# Patient Record
Sex: FEMALE | Race: WHITE | Employment: OTHER | URBAN - NONMETROPOLITAN AREA
[De-identification: names, ages, dates, MRNs, and addresses within clinical notes are randomized per-mention and may not be internally consistent; named-entity substitution may affect disease eponyms.]

---

## 2013-02-26 LAB
CHOLESTEROL, TOTAL: 138 MG/DL
CHOLESTEROL/HDL RATIO: 3.5
HDLC SERPL-MCNC: 40 MG/DL (ref 35–70)
LDL CHOLESTEROL CALCULATED: 79.6 MG/DL (ref 0–160)
TRIGL SERPL-MCNC: NORMAL MG/DL
VLDLC SERPL CALC-MCNC: 18 MG/DL

## 2017-09-30 LAB
AGE FOR GFR: 76
ANION GAP SERPL CALCULATED.3IONS-SCNC: 14 MMOL/L
BUN BLDV-MCNC: 13 MG/DL
CALCIUM SERPL-MCNC: 10 MG/DL
CHLORIDE BLD-SCNC: 103 MMOL/L
CO2: 31 MMOL/L
CREAT SERPL-MCNC: 0.7 MG/DL
EGFR BF: 98 ML/MIN/1.73 M2
EGFR BM: 133 ML/MIN/1.73 M2
EGFR WF: 81 ML/MIN/1.73 M2
EGFR WM: 110 ML/MIN/1.73 M2
GLUCOSE: 78 MG/DL
HCT VFR BLD CALC: 45.6 %
HEMOGLOBIN: 15.2 G/DL
MCH RBC QN AUTO: 30.6 PG
MCHC RBC AUTO-ENTMCNC: 33.2 G/DL
MCV RBC AUTO: 92.1 FL
PDW BLD-RTO: 12.2 %
PLATELET # BLD: 279 THOU/MM3
PMV BLD AUTO: 9.2 FL
POTASSIUM SERPL-SCNC: 4.5 MMOL/L
RBC # BLD: 4.95 M/UL
SODIUM BLD-SCNC: 143 MMOL/L
VITAMIN D 1,25-DIHYDROXY: NORMAL
WBC # BLD: 7.61 THOU/ML3

## 2017-10-02 VITALS
WEIGHT: 140 LBS | HEART RATE: 64 BPM | DIASTOLIC BLOOD PRESSURE: 66 MMHG | HEIGHT: 65 IN | BODY MASS INDEX: 23.32 KG/M2 | SYSTOLIC BLOOD PRESSURE: 110 MMHG

## 2017-10-02 DIAGNOSIS — I10 UNSPECIFIED ESSENTIAL HYPERTENSION: ICD-10-CM

## 2017-10-02 DIAGNOSIS — E55.9 VITAMIN D DEFICIENCY: ICD-10-CM

## 2017-10-02 DIAGNOSIS — R32 UNSPECIFIED URINARY INCONTINENCE: ICD-10-CM

## 2017-10-02 DIAGNOSIS — M81.0 AGE-RELATED OSTEOPOROSIS WITHOUT CURRENT PATHOLOGICAL FRACTURE: ICD-10-CM

## 2017-10-02 DIAGNOSIS — G12.23 PRIMARY LATERAL SCLEROSIS (HCC): ICD-10-CM

## 2017-10-02 RX ORDER — FOLIC ACID 1 MG/1
1 TABLET ORAL DAILY
COMMUNITY

## 2017-10-03 ENCOUNTER — OFFICE VISIT (OUTPATIENT)
Dept: FAMILY MEDICINE CLINIC | Age: 77
End: 2017-10-03
Payer: MEDICARE

## 2017-10-03 VITALS
DIASTOLIC BLOOD PRESSURE: 70 MMHG | SYSTOLIC BLOOD PRESSURE: 120 MMHG | WEIGHT: 140 LBS | HEART RATE: 68 BPM | HEIGHT: 65 IN | BODY MASS INDEX: 23.32 KG/M2

## 2017-10-03 DIAGNOSIS — I10 UNSPECIFIED ESSENTIAL HYPERTENSION: Primary | ICD-10-CM

## 2017-10-03 DIAGNOSIS — Z23 NEED FOR PROPHYLACTIC VACCINATION AND INOCULATION AGAINST INFLUENZA: ICD-10-CM

## 2017-10-03 DIAGNOSIS — G12.23 PRIMARY LATERAL SCLEROSIS (HCC): ICD-10-CM

## 2017-10-03 DIAGNOSIS — E55.9 VITAMIN D DEFICIENCY: ICD-10-CM

## 2017-10-03 DIAGNOSIS — R32 UNSPECIFIED URINARY INCONTINENCE: ICD-10-CM

## 2017-10-03 DIAGNOSIS — M81.0 AGE-RELATED OSTEOPOROSIS WITHOUT CURRENT PATHOLOGICAL FRACTURE: ICD-10-CM

## 2017-10-03 PROCEDURE — G0008 ADMIN INFLUENZA VIRUS VAC: HCPCS | Performed by: FAMILY MEDICINE

## 2017-10-03 PROCEDURE — 90662 IIV NO PRSV INCREASED AG IM: CPT | Performed by: FAMILY MEDICINE

## 2017-10-03 PROCEDURE — 99214 OFFICE O/P EST MOD 30 MIN: CPT | Performed by: FAMILY MEDICINE

## 2017-10-03 ASSESSMENT — ENCOUNTER SYMPTOMS
WHEEZING: 0
SHORTNESS OF BREATH: 0
CONSTIPATION: 0
DIARRHEA: 0
ABDOMINAL PAIN: 0
BLOOD IN STOOL: 0
SORE THROAT: 0

## 2017-10-03 ASSESSMENT — PATIENT HEALTH QUESTIONNAIRE - PHQ9
1. LITTLE INTEREST OR PLEASURE IN DOING THINGS: 0
2. FEELING DOWN, DEPRESSED OR HOPELESS: 0
SUM OF ALL RESPONSES TO PHQ9 QUESTIONS 1 & 2: 0
SUM OF ALL RESPONSES TO PHQ QUESTIONS 1-9: 0

## 2017-10-03 NOTE — PROGRESS NOTES
[address] [address] [address]  Dept: [phone]  Dept Fax: [phone]    Grady Mcleod is a 69 y.o. female who presents today for her medical conditions/complaints as noted below.   Grady Mcleod is c/o of 6 Month Follow-Up (pt would like to have a referral to podiatrist states it is difficult to trim her toenails, pt also states it is becoming more difficult for her to get around with her sclerosis states there are times where she gets very stiff) and Hypertension (denies chest pains,denies sob,denies dizziness with physical exercises at home, denies leg edema, denies palpatations)      HPI:     HPI   Hypertension  Doesn't really check   well controlled; BP: 120/70   No problems with medication side effects; tolerating well  No chest pain  No edema   Neurology  Dr Dani Castaneda- doesn't see him until November; appointments moved back a couple of times   Thinks her PLS has progressed a little since last visit  Feels a little stiffer; left leg in particular seems more difficult to move     Patient would like a referral to podiatry   Needs her nails trimmed and left great toenail is \"overgrown\" and hurting  Physically she cannot reach it           BP Readings from Last 3 Encounters:   10/03/17 120/70   03/31/17 110/66   07/18/16 124/64            (goal 120/80)    Past Medical History:   Diagnosis Date    Hypertension     Osteoarthritis     Osteoporosis       Past Surgical History:   Procedure Laterality Date    APPENDECTOMY  11/1977    HYSTERECTOMY, TOTAL ABDOMINAL  1977    ovaries remain    TONSILLECTOMY AND ADENOIDECTOMY  1946     Family History   Problem Relation Age of Onset   [employer] Migraines Mother     Heart Disease Mother      valvular    Migraines Father     Coronary Art Dis Father     Other Father      COPD    Cancer Brother      throat    Breast Cancer Maternal Aunt      Social History   Substance Use Topics    Smoking status: Never Smoker    Smokeless tobacco: Never Used    Alcohol use No        Current Outpatient Prescriptions   Medication Sig Dispense Refill    oxybutynin (OXYTROL) 3.9 MG/24HR Place 1 patch onto the skin Twice a Week      folic acid (FOLVITE) 1 MG tablet Take 1 mg by mouth daily      VITAMIN D-3 (COLECALCIFEROL) 400 UNITS TABS Take  by mouth daily.  alendronate (FOSAMAX) 70 MG tablet Take 70 mg by mouth every 7 days.  lisinopril (PRINIVIL;ZESTRIL) 10 MG tablet Take 10 mg by mouth daily.  baclofen (LIORESAL) 20 MG tablet Take 20 mg by mouth 3 times daily.  vitamin B-12 (CYANOCOBALAMIN) 500 MCG tablet Take 500 mcg by mouth daily. No current facility-administered medications for this visit. No Known Allergies    Health Maintenance   Topic Date Due    Zostavax vaccine  01/11/2001    DTaP/Tdap/Td vaccine (1 - Tdap) 07/17/2009    Lipid screen  02/26/2018    DEXA (modify frequency per FRAX score)  Completed    Flu vaccine  Completed    Pneumococcal low/med risk  Completed       Subjective:      Review of Systems   Constitutional: Negative for appetite change, chills and fever. HENT: Negative for sore throat. Respiratory: Negative for shortness of breath and wheezing. Cardiovascular: Negative for chest pain, palpitations and leg swelling. Gastrointestinal: Negative for abdominal pain, blood in stool, constipation and diarrhea. Genitourinary: Negative for dysuria, hematuria and urgency. Neurological: Negative for dizziness, syncope and speech difficulty. Denies any falls; uses walker   Hematological: Negative for adenopathy. Objective:     /70  Pulse 68  Ht 5' 5\" (1.651 m)  Wt 140 lb (63.5 kg)  BMI 23.3 kg/m2    Physical Exam   Constitutional: She appears well-developed and well-nourished. HENT:   Head: Normocephalic.    Right Ear: Tympanic membrane normal.   Left Ear: Tympanic membrane normal.   Nose: Nose normal.   Mouth/Throat: No oropharyngeal exudate Priority:   Routine     Referral Type:   Eval and Treat     Referral Reason:   Specialty Services Required     Referred to Provider:   Mojgan Dial DPM     Requested Specialty:   Podiatry     Number of Visits Requested:   1     Prescriptions:    No orders of the defined types were placed in this encounter. Return in about 6 months (around 4/3/2018). Electronically signed by Jazmine Hinds MD on 10/4/2017.

## 2017-10-03 NOTE — MR AVS SNAPSHOT
oxybutynin (OXYTROL) 3.9 MG/24HR Place 1 patch onto the skin Twice a Week    folic acid (FOLVITE) 1 MG tablet Take 1 mg by mouth daily    VITAMIN D-3 (COLECALCIFEROL) 400 UNITS TABS Take  by mouth daily. alendronate (FOSAMAX) 70 MG tablet Take 70 mg by mouth every 7 days. lisinopril (PRINIVIL;ZESTRIL) 10 MG tablet Take 10 mg by mouth daily. baclofen (LIORESAL) 20 MG tablet Take 20 mg by mouth 3 times daily. vitamin B-12 (CYANOCOBALAMIN) 500 MCG tablet Take 500 mcg by mouth daily. Allergies           No Known Allergies      We Ordered/Performed the following           INFLUENZA, HIGH DOSE, 65 YRS +, IM, PF, PREFILL SYR, 0.5ML (FLUZONE HD)     KYLER Lyons [place], [facility] [place]     Scheduling Instructions:    [address], [facility]  [place], [address]  [phone]    Comments:    Needs routine care; unable to do herself due to her neurologic disease         Additional Information        Basic Information     Date Of Birth Sex Race Ethnicity Preferred Language    1940 Female White Non-/Non  English      Problem List as of 10/3/2017  Date Reviewed: 10/3/2017                Unspecified essential hypertension    Age-related osteoporosis without current pathological fracture    Primary lateral sclerosis    Unspecified urinary incontinence    Vitamin D deficiency      Immunizations as of 10/3/2017     Name Date    Influenza, High Dose 10/3/2017    Pneumococcal 13-valent Conjugate ([id number]) 9/16/2015    Pneumococcal Polysaccharide ([id number]) 7/16/2009    Td 7/16/2009      Preventive Care        Date Due    Zoster Vaccine 1/11/2001    Tetanus Combination Vaccine (1 - Tdap) 7/17/2009    Cholesterol Screening 2/26/2018            MyChart Signup           [email or web address] allows you to send messages to your doctor, view your test results, renew your prescriptions, schedule appointments, view visit notes, and more. How Do I Sign Up? 1. In your Internet browser, go to [email or web address]  2. Click on the Sign Up Now link in the Sign In box. You will see the New Member Sign Up page. 3. Enter your [employer] Access Code exactly as it appears below. You will not need to use this code after youve completed the sign-up process. If you do not sign up before the expiration date, you must request a new code. [employer] Access Code: [email or web address]  Expires: 12/2/2017 11:27 AM    4. Enter your Social Security Number ([id number]) and Date of Birth (mm/dd/yyyy) as indicated and click Submit. You will be taken to the next sign-up page. 5. Create a Arkeot ID. This will be your Educanon login ID and cannot be changed, so think of one that is secure and easy to remember. 6. Create a Educanon password. You can change your password at any time. 7. Enter your Password Reset Question and Answer. This can be used at a later time if you forget your password. 8. Enter your e-mail address. You will receive e-mail notification when new information is available in [address] 9. Click Sign Up. You can now view your medical record. Additional Information  If you have questions, please contact the physician practice where you receive care. Remember, Educanon is NOT to be used for urgent needs. For medical emergencies, dial 911. For questions regarding your [employer]can[employer] account call [phone]. If you have a clinical question, please call your doctor's office.

## 2017-10-13 DIAGNOSIS — M81.0 AGE-RELATED OSTEOPOROSIS WITHOUT CURRENT PATHOLOGICAL FRACTURE: ICD-10-CM

## 2017-10-17 ENCOUNTER — OFFICE VISIT (OUTPATIENT)
Dept: PODIATRY | Age: 77
End: 2017-10-17
Payer: MEDICARE

## 2017-10-17 VITALS
HEART RATE: 60 BPM | SYSTOLIC BLOOD PRESSURE: 122 MMHG | DIASTOLIC BLOOD PRESSURE: 70 MMHG | WEIGHT: 141 LBS | BODY MASS INDEX: 23.49 KG/M2 | HEIGHT: 65 IN

## 2017-10-17 DIAGNOSIS — I73.9 PVD (PERIPHERAL VASCULAR DISEASE) (HCC): Primary | ICD-10-CM

## 2017-10-17 DIAGNOSIS — B35.1 DERMATOPHYTOSIS OF NAIL: ICD-10-CM

## 2017-10-17 PROCEDURE — 99202 OFFICE O/P NEW SF 15 MIN: CPT | Performed by: PODIATRIST

## 2017-10-17 PROCEDURE — 11721 DEBRIDE NAIL 6 OR MORE: CPT | Performed by: PODIATRIST

## 2017-10-17 NOTE — PROGRESS NOTES
Subjective:  Patient presents to [facility] today for routine foot care. Pt unable to take care of the nails by herself. Pain at great toes. No n/v/f/c/d. Patient denies any new problems with their feet. No Known Allergies    Past Medical History:   Diagnosis Date    Hypertension     Osteoarthritis     Osteoporosis     Primary lateral sclerosis        Prior to Admission medications    Medication Sig Start Date End Date Taking? Authorizing Provider   oxybutynin (OXYTROL) 3.9 MG/24HR Place 1 patch onto the skin Twice a Week   Yes Historical Provider, MD   folic acid (FOLVITE) 1 MG tablet Take 1 mg by mouth daily   Yes Historical Provider, MD   VITAMIN D-3 (COLECALCIFEROL) 400 UNITS TABS Take  by mouth daily. Yes Historical Provider, MD   alendronate (FOSAMAX) 70 MG tablet Take 70 mg by mouth every 7 days. Yes Historical Provider, MD   lisinopril (PRINIVIL;ZESTRIL) 10 MG tablet Take 10 mg by mouth daily. Yes Historical Provider, MD   baclofen (LIORESAL) 20 MG tablet Take 20 mg by mouth 3 times daily. Yes Historical Provider, MD   vitamin B-12 (CYANOCOBALAMIN) 500 MCG tablet Take 500 mcg by mouth daily. Yes Historical Provider, MD       Social History   Substance Use Topics    Smoking status: Never Smoker    Smokeless tobacco: Never Used    Alcohol use No     Review of Systems: All 12 systems reviewed and pertinent positives noted above. Objective:  Vascular: DP and PT pulses palpable 2/4, bilateral.  CFT <3 seconds, bilateral.  Hair growth present to the level of the digits, bilateral.  Edema present, bilateral.  Varicosities present, bilateral. Erythema absent, bilateral. Distal Rubor absent bilateral.  Temperature within normal limits bilateral. Hyperpigmentation present bilateral. Atrophic skin yes.   Neurological: Sensation intact to light touch to level of digits, bilateral.  Protective sensation intact 10/10 sites via 5.07/10g [place] Monofilament, bilateral.  negative Tinel's, bilateral.  negative Valleix sign, bilateral.  Vibratory intact bilateral.  Reflexes Decreased bilateral.  Paresthesias negative. Dysthesias negative. Sharp/dull intact bilateral.    Musculoskeletal: Muscle strength 5/5, bilateral.  Pain absent upon palpation bilateral. Normal medial longitudinal arch, bilateral.  Ankle ROM within normal limits,bilateral.  1st MPJ ROM within normal limits, bilateral.  Dorsally contracted digits absent. No other foot deformities. Integument:  Nails left 1, 2, 3, 4, 5 and right 1, 2, 3, 4, 5 thickened, dystrophic and crumbly, discolored with subungual debris. Hyperkeratotic tissue is absent. Assessment:  1. PVD (peripheral vascular disease) (HCC)  RI DEBRIDEMENT OF NAILS, 6 OR MORE   2. Dermatophytosis of nail  RI DEBRIDEMENT OF NAILS, 6 OR MORE       Plan:  Nails as mentioned above debrided in length and thickness. Patient advised OTC methods for treatment of the mycotic nails. Patient will follow up in 10 weeks.

## 2017-11-08 ENCOUNTER — OFFICE VISIT (OUTPATIENT)
Dept: NEUROLOGY | Age: 77
End: 2017-11-08
Payer: MEDICARE

## 2017-11-08 VITALS
HEIGHT: 65 IN | WEIGHT: 138 LBS | BODY MASS INDEX: 22.99 KG/M2 | HEART RATE: 58 BPM | SYSTOLIC BLOOD PRESSURE: 105 MMHG | DIASTOLIC BLOOD PRESSURE: 59 MMHG

## 2017-11-08 DIAGNOSIS — R42 VERTIGO: ICD-10-CM

## 2017-11-08 DIAGNOSIS — G12.23 PRIMARY LATERAL SCLEROSIS (HCC): Primary | ICD-10-CM

## 2017-11-08 PROCEDURE — 99214 OFFICE O/P EST MOD 30 MIN: CPT | Performed by: PSYCHIATRY & NEUROLOGY

## 2017-11-08 ASSESSMENT — ENCOUNTER SYMPTOMS
GASTROINTESTINAL NEGATIVE: 1
RESPIRATORY NEGATIVE: 1
EYES NEGATIVE: 1

## 2017-11-13 NOTE — COMMUNICATION BODY
valvular    Migraines Father     Coronary Art Dis Father     Other Father      COPD    Cancer Brother      throat    Breast Cancer Maternal Aunt        Social History     Social History    Marital status:      Spouse name: N/A    Number of children: N/A    Years of education: N/A     Social History Main Topics    Smoking status: Never Smoker    Smokeless tobacco: Never Used    Alcohol use No    Drug use: No    Sexual activity: Not Asked     Other Topics Concern    None     Social History Narrative    None       Current Outpatient Prescriptions   Medication Sig Dispense Refill    oxybutynin (OXYTROL) 3.9 MG/24HR Place 1 patch onto the skin Twice a Week      folic acid (FOLVITE) 1 MG tablet Take 1 mg by mouth daily      VITAMIN D-3 (COLECALCIFEROL) 400 UNITS TABS Take  by mouth daily.  alendronate (FOSAMAX) 70 MG tablet Take 70 mg by mouth every 7 days.  lisinopril (PRINIVIL;ZESTRIL) 10 MG tablet Take 10 mg by mouth daily.  baclofen (LIORESAL) 20 MG tablet Take 20 mg by mouth 3 times daily.  vitamin B-12 (CYANOCOBALAMIN) 500 MCG tablet Take 500 mcg by mouth daily. No current facility-administered medications for this visit. No Known Allergies      Review of Systems   Constitutional: Negative. HENT: Negative. Eyes: Negative. Respiratory: Negative. Cardiovascular: Negative. Gastrointestinal: Negative. Endocrine: Negative. Genitourinary: Positive for frequency and urgency. Musculoskeletal: Negative for gait problem. Skin: Negative. Neurological: Negative. Psychiatric/Behavioral: Negative. Objective:   Physical Exam  Vitals:    11/08/17 1425   BP: (!) 105/59   Pulse: 58     weight: 138 lb (62.6 kg)  Neurological Examination  Constitutional .General exam well groomed   Ears /Nose/Throat: external ear . Normal exam  Neck and thyroid . Normal size  Respiratory . Breathsounds clear bilaterally  Cardiovascular:  Auscultation of heart

## 2017-11-22 ENCOUNTER — HOSPITAL ENCOUNTER (OUTPATIENT)
Dept: INTERVENTIONAL RADIOLOGY/VASCULAR | Age: 77
Discharge: HOME | End: 2017-11-22
Payer: MEDICARE

## 2017-11-22 ENCOUNTER — HOSPITAL ENCOUNTER (OUTPATIENT)
Dept: CT IMAGING | Age: 77
Discharge: HOME | End: 2017-11-22
Payer: MEDICARE

## 2017-11-22 DIAGNOSIS — R42 VERTIGO: ICD-10-CM

## 2017-11-22 PROCEDURE — 70450 CT HEAD/BRAIN W/O DYE: CPT

## 2017-11-22 PROCEDURE — 93880 EXTRACRANIAL BILAT STUDY: CPT

## 2018-04-03 ENCOUNTER — OFFICE VISIT (OUTPATIENT)
Dept: FAMILY MEDICINE CLINIC | Age: 78
End: 2018-04-03
Payer: MEDICARE

## 2018-04-03 VITALS
HEART RATE: 64 BPM | DIASTOLIC BLOOD PRESSURE: 70 MMHG | SYSTOLIC BLOOD PRESSURE: 104 MMHG | BODY MASS INDEX: 22.8 KG/M2 | WEIGHT: 137 LBS

## 2018-04-03 DIAGNOSIS — M19.90 OSTEOARTHRITIS, UNSPECIFIED OSTEOARTHRITIS TYPE, UNSPECIFIED SITE: ICD-10-CM

## 2018-04-03 DIAGNOSIS — G12.23 PRIMARY LATERAL SCLEROSIS (HCC): ICD-10-CM

## 2018-04-03 DIAGNOSIS — E55.9 VITAMIN D DEFICIENCY: ICD-10-CM

## 2018-04-03 DIAGNOSIS — M81.0 AGE-RELATED OSTEOPOROSIS WITHOUT CURRENT PATHOLOGICAL FRACTURE: ICD-10-CM

## 2018-04-03 DIAGNOSIS — I10 ESSENTIAL HYPERTENSION: Primary | ICD-10-CM

## 2018-04-03 PROCEDURE — 99214 OFFICE O/P EST MOD 30 MIN: CPT | Performed by: FAMILY MEDICINE

## 2018-04-03 RX ORDER — LISINOPRIL 10 MG/1
10 TABLET ORAL DAILY
Qty: 30 TABLET | Refills: 5 | Status: SHIPPED | OUTPATIENT
Start: 2018-04-03 | End: 2018-07-30 | Stop reason: SDUPTHER

## 2018-04-03 RX ORDER — BACLOFEN 20 MG/1
20 TABLET ORAL 3 TIMES DAILY
Qty: 90 TABLET | Refills: 3 | Status: SHIPPED | OUTPATIENT
Start: 2018-04-03 | End: 2018-07-27 | Stop reason: SDUPTHER

## 2018-04-03 RX ORDER — ALENDRONATE SODIUM 70 MG/1
70 TABLET ORAL
Qty: 4 TABLET | Refills: 12 | Status: SHIPPED | OUTPATIENT
Start: 2018-04-03 | End: 2018-10-11 | Stop reason: ALTCHOICE

## 2018-04-03 ASSESSMENT — ENCOUNTER SYMPTOMS
BLOOD IN STOOL: 0
SHORTNESS OF BREATH: 0
CONSTIPATION: 0
ABDOMINAL PAIN: 0
DIARRHEA: 0
WHEEZING: 0
SORE THROAT: 0

## 2018-07-27 DIAGNOSIS — I10 ESSENTIAL HYPERTENSION: ICD-10-CM

## 2018-07-27 RX ORDER — BACLOFEN 20 MG/1
TABLET ORAL
Qty: 90 TABLET | Refills: 3 | Status: SHIPPED | OUTPATIENT
Start: 2018-07-27 | End: 2019-10-11

## 2018-07-27 NOTE — TELEPHONE ENCOUNTER
Emili Aguilar is calling to request a refill on the following medication(s):  Requested Prescriptions     Pending Prescriptions Disp Refills    baclofen (LIORESAL) 20 MG tablet [Pharmacy Med Name: BACLOFEN 20MG       TAB] 90 tablet 3     Sig: TAKE 1 TABLET BY MOUTH THREE TIMES DAILY       Last Visit Date (If Applicable):  6/0/7410    Next Visit Date:    10/11/2018

## 2018-07-30 DIAGNOSIS — I10 ESSENTIAL HYPERTENSION: ICD-10-CM

## 2018-07-30 RX ORDER — LISINOPRIL 10 MG/1
10 TABLET ORAL DAILY
Qty: 90 TABLET | Refills: 3 | Status: SHIPPED | OUTPATIENT
Start: 2018-07-30 | End: 2019-10-07 | Stop reason: SDUPTHER

## 2018-10-03 LAB
A/G RATIO: 1.3 RATIO
AGE FOR GFR: 77
ALBUMIN: 3.8 G/DL
ALK PHOSPHATASE: 60 UNITS/L
ALT SERPL-CCNC: 35 UNITS/L
ANION GAP SERPL CALCULATED.3IONS-SCNC: 10 MMOL/L
AST SERPL-CCNC: 16 UNITS/L
BASOPHILS # BLD: 0.05 THOU/MM3
BILIRUB SERPL-MCNC: 0.7 MG/DL
BUN BLDV-MCNC: 14 MG/DL
CALCIUM SERPL-MCNC: 8.9 MG/DL
CHLORIDE BLD-SCNC: 103 MMOL/L
CO2: 30 MMOL/L
CREAT SERPL-MCNC: 0.6 MG/DL
DIFFERENTIAL: AUTOMATED DIFF
EGFR BF: 117 ML/MIN/1.73 M2
EGFR BM: 158 ML/MIN/1.73 M2
EGFR WF: 97 ML/MIN/1.73 M2
EGFR WM: 131 ML/MIN/1.73 M2
EOSINOPHIL # BLD: 0.09 THOU/MM3
GLOBULIN: 2.9 G/DL
GLUCOSE: 90 MG/DL
HCT VFR BLD CALC: 45.1 %
HEMOGLOBIN: 15 G/DL
LYMPHOCYTES # BLD: 1.23 THOU/MM3
MCH RBC QN AUTO: 30.5 PG
MCHC RBC AUTO-ENTMCNC: 33.3 G/DL
MCV RBC AUTO: 91.7 FL
MONOCYTES # BLD: 0.45 THOU/MM3
NEUTROPHILS: 4.47 THOU/MM3
PDW BLD-RTO: 11.8 %
PLATELET # BLD: 250 THOU/MM3
PMV BLD AUTO: 8.3 FL
POTASSIUM SERPL-SCNC: 4.1 MMOL/L
RBC # BLD: 4.91 M/UL
SODIUM BLD-SCNC: 139 MMOL/L
TOTAL PROTEIN: 6.7 G/DL
VITAMIN D 1,25-DIHYDROXY: NORMAL
WBC # BLD: 6.28 THOU/ML3

## 2018-10-11 ENCOUNTER — OFFICE VISIT (OUTPATIENT)
Dept: FAMILY MEDICINE CLINIC | Age: 78
End: 2018-10-11
Payer: MEDICARE

## 2018-10-11 VITALS
DIASTOLIC BLOOD PRESSURE: 62 MMHG | OXYGEN SATURATION: 96 % | WEIGHT: 141 LBS | SYSTOLIC BLOOD PRESSURE: 122 MMHG | TEMPERATURE: 98.4 F | BODY MASS INDEX: 23.46 KG/M2 | HEART RATE: 61 BPM

## 2018-10-11 DIAGNOSIS — Z23 NEED FOR 23-POLYVALENT PNEUMOCOCCAL POLYSACCHARIDE VACCINE: ICD-10-CM

## 2018-10-11 DIAGNOSIS — G12.23 PRIMARY LATERAL SCLEROSIS (HCC): Primary | ICD-10-CM

## 2018-10-11 DIAGNOSIS — Z23 NEED FOR PROPHYLACTIC VACCINATION AND INOCULATION AGAINST INFLUENZA: ICD-10-CM

## 2018-10-11 DIAGNOSIS — I10 ESSENTIAL HYPERTENSION: ICD-10-CM

## 2018-10-11 DIAGNOSIS — M81.0 AGE-RELATED OSTEOPOROSIS WITHOUT CURRENT PATHOLOGICAL FRACTURE: ICD-10-CM

## 2018-10-11 DIAGNOSIS — L89.302 PRESSURE INJURY OF BUTTOCK, STAGE 2, UNSPECIFIED LATERALITY (HCC): ICD-10-CM

## 2018-10-11 PROCEDURE — 90732 PPSV23 VACC 2 YRS+ SUBQ/IM: CPT | Performed by: FAMILY MEDICINE

## 2018-10-11 PROCEDURE — G0009 ADMIN PNEUMOCOCCAL VACCINE: HCPCS | Performed by: FAMILY MEDICINE

## 2018-10-11 PROCEDURE — 99214 OFFICE O/P EST MOD 30 MIN: CPT | Performed by: FAMILY MEDICINE

## 2018-10-11 PROCEDURE — 90662 IIV NO PRSV INCREASED AG IM: CPT | Performed by: FAMILY MEDICINE

## 2018-10-11 PROCEDURE — G0008 ADMIN INFLUENZA VIRUS VAC: HCPCS | Performed by: FAMILY MEDICINE

## 2018-10-11 RX ORDER — OSTOMY SUPPLY 1"
1 EACH MISCELLANEOUS
Qty: 5 G | Refills: 1 | Status: SHIPPED | OUTPATIENT
Start: 2018-10-11 | End: 2018-11-14 | Stop reason: ALTCHOICE

## 2018-10-11 ASSESSMENT — PATIENT HEALTH QUESTIONNAIRE - PHQ9
SUM OF ALL RESPONSES TO PHQ9 QUESTIONS 1 & 2: 0
2. FEELING DOWN, DEPRESSED OR HOPELESS: 0
SUM OF ALL RESPONSES TO PHQ QUESTIONS 1-9: 0
1. LITTLE INTEREST OR PLEASURE IN DOING THINGS: 0
SUM OF ALL RESPONSES TO PHQ QUESTIONS 1-9: 0

## 2018-10-12 ENCOUNTER — TELEPHONE (OUTPATIENT)
Dept: FAMILY MEDICINE CLINIC | Age: 78
End: 2018-10-12

## 2018-10-12 DIAGNOSIS — L89.302 PRESSURE INJURY OF BUTTOCK, STAGE 2, UNSPECIFIED LATERALITY (HCC): Primary | ICD-10-CM

## 2018-10-12 NOTE — PROGRESS NOTES
[address] [address] [address]  Dept: [phone]  Dept Fax: [phone]    Shari Lozada is a 69 y.o. female who presents today for her medical conditions/complaints as noted below. Shari Lozada is c/o of Hypertension (6 month f/u, Thinks POS is progressing, legs are getting weaker and more stiff. Has a sore spot on her lower back. ) and Flu Vaccine      HPI:     Sourav Marley is here for routine checkup. Hypertension  She feels well. She denies any symptoms of chest pain, chest pressure, palpitations, edema, or shortness of breath. Albeit, she is not very active. Blood pressure is 122/68. She does not check. She remains on lisinopril 10 mg daily. She had recent labs which showed normal renal function. Primary lateral sclerosis  She is followed by Dr. Meng Mc in [place]. She goes once yearly. She is due to see him next month. She wears an AFO for her left foot. She says that her left leg is getting weaker. She is unable to stand long enough to take a shower. She lives by herself. There really is not treatment for this. She takes baclofen for muscle spasms, vitamin B12 and vitamin D. She has not had any falls. She uses her walker faithfully. Osteoporosis  She has been on alendronate now for 4 or 5 years continuously. Last bone density test was last year which showed a T-score of -2.3 from the left femoral neck. We have been watching pretty regularly. She complains of tailbone pain. However, it does not hurt when she is sitting. It hurts when she gets up out of a chair or when she gets down and sits in a chair. She has been using some Neosporin on the buttocks.         BP Readings from Last 3 Encounters:   10/11/18 122/62   04/03/18 104/70   11/08/17 (!) 105/59            (goal 120/80)    Past Medical History:   Diagnosis Date    Hypertension     Osteoarthritis     Osteoporosis     [facility]

## 2018-10-15 RX ORDER — OSTOMY SUPPLY 1"
EACH MISCELLANEOUS
Qty: 1 PACKAGE | Refills: 1 | Status: SHIPPED | OUTPATIENT
Start: 2018-10-15 | End: 2018-11-14 | Stop reason: ALTCHOICE

## 2018-10-15 ASSESSMENT — ENCOUNTER SYMPTOMS
WHEEZING: 0
BACK PAIN: 1
BLOOD IN STOOL: 0
SHORTNESS OF BREATH: 0
CHEST TIGHTNESS: 0
ABDOMINAL DISTENTION: 0

## 2018-11-14 ENCOUNTER — OFFICE VISIT (OUTPATIENT)
Dept: NEUROLOGY | Age: 78
End: 2018-11-14
Payer: MEDICARE

## 2018-11-14 VITALS — HEART RATE: 54 BPM | SYSTOLIC BLOOD PRESSURE: 124 MMHG | DIASTOLIC BLOOD PRESSURE: 49 MMHG

## 2018-11-14 DIAGNOSIS — G12.23 PRIMARY LATERAL SCLEROSIS (HCC): Primary | ICD-10-CM

## 2018-11-14 PROCEDURE — 99214 OFFICE O/P EST MOD 30 MIN: CPT | Performed by: PSYCHIATRY & NEUROLOGY

## 2018-11-14 RX ORDER — BACLOFEN 20 MG/1
20 TABLET ORAL 3 TIMES DAILY
Qty: 270 TABLET | Refills: 3 | Status: SHIPPED | OUTPATIENT
Start: 2018-11-14 | End: 2019-09-20 | Stop reason: SDUPTHER

## 2018-11-14 ASSESSMENT — ENCOUNTER SYMPTOMS
ALLERGIC/IMMUNOLOGIC NEGATIVE: 1
EYES NEGATIVE: 1
GASTROINTESTINAL NEGATIVE: 1
RESPIRATORY NEGATIVE: 1

## 2018-11-15 NOTE — COMMUNICATION BODY
COPD    Cancer Brother         throat    Breast Cancer Maternal Aunt        Social History     Social History    Marital status:      Spouse name: N/A    Number of children: N/A    Years of education: N/A     Social History Main Topics    Smoking status: Never Smoker    Smokeless tobacco: Never Used    Alcohol use No    Drug use: No    Sexual activity: Not Asked     Other Topics Concern    None     Social History Narrative    None       Current Outpatient Prescriptions   Medication Sig Dispense Refill    baclofen (LIORESAL) 20 MG tablet Take 1 tablet by mouth 3 times daily 270 tablet 3    lisinopril (PRINIVIL;ZESTRIL) 10 MG tablet Take 1 tablet by mouth daily 90 tablet 3    baclofen (LIORESAL) 20 MG tablet TAKE 1 TABLET BY MOUTH THREE TIMES DAILY 90 tablet 3    aspirin 81 MG tablet Take 81 mg by mouth daily      oxybutynin (OXYTROL) 3.9 MG/24HR Place 1 patch onto the skin Twice a Week      folic acid (FOLVITE) 1 MG tablet Take 1 mg by mouth daily      VITAMIN D-3 (COLECALCIFEROL) 400 UNITS TABS Take  by mouth daily.  vitamin B-12 (CYANOCOBALAMIN) 500 MCG tablet Take 500 mcg by mouth daily. No current facility-administered medications for this visit. No Known Allergies      Review of Systems   HENT: Negative. Eyes: Negative. Respiratory: Negative. Cardiovascular: Negative. Gastrointestinal: Negative. Endocrine: Negative. Genitourinary: Positive for frequency and urgency. Musculoskeletal: Positive for gait problem. Skin: Negative. Allergic/Immunologic: Negative. Neurological: Positive for weakness. Psychiatric/Behavioral: Negative. Objective:   Physical Exam  Vitals:    11/14/18 1427   BP: (!) 124/49   Pulse: 54     weight:    Neurological Examination  Constitutional .General exam well groomed   Ears /Nose/Throat: external ear . Normal exam  Neck and thyroid . Normal size  Respiratory . Breathsounds clear bilaterally  Cardiovascular:

## 2019-04-11 ENCOUNTER — OFFICE VISIT (OUTPATIENT)
Dept: FAMILY MEDICINE CLINIC | Age: 79
End: 2019-04-11
Payer: MEDICARE

## 2019-04-11 VITALS
DIASTOLIC BLOOD PRESSURE: 70 MMHG | SYSTOLIC BLOOD PRESSURE: 104 MMHG | OXYGEN SATURATION: 95 % | BODY MASS INDEX: 23.82 KG/M2 | HEIGHT: 65 IN | WEIGHT: 143 LBS | HEART RATE: 67 BPM

## 2019-04-11 DIAGNOSIS — I10 ESSENTIAL HYPERTENSION: ICD-10-CM

## 2019-04-11 DIAGNOSIS — Z13.6 SCREENING FOR CARDIOVASCULAR CONDITION: ICD-10-CM

## 2019-04-11 DIAGNOSIS — Z00.00 ROUTINE GENERAL MEDICAL EXAMINATION AT A HEALTH CARE FACILITY: Primary | ICD-10-CM

## 2019-04-11 DIAGNOSIS — G12.23 PRIMARY LATERAL SCLEROSIS (HCC): ICD-10-CM

## 2019-04-11 DIAGNOSIS — N39.41 URGE INCONTINENCE: ICD-10-CM

## 2019-04-11 PROCEDURE — 99213 OFFICE O/P EST LOW 20 MIN: CPT | Performed by: FAMILY MEDICINE

## 2019-04-11 PROCEDURE — G0438 PPPS, INITIAL VISIT: HCPCS | Performed by: FAMILY MEDICINE

## 2019-04-11 ASSESSMENT — ENCOUNTER SYMPTOMS
SHORTNESS OF BREATH: 0
CHEST TIGHTNESS: 0
ABDOMINAL DISTENTION: 0
WHEEZING: 0
BACK PAIN: 1
BLOOD IN STOOL: 0

## 2019-04-11 ASSESSMENT — PATIENT HEALTH QUESTIONNAIRE - PHQ9
SUM OF ALL RESPONSES TO PHQ QUESTIONS 1-9: 0
SUM OF ALL RESPONSES TO PHQ QUESTIONS 1-9: 0

## 2019-04-11 ASSESSMENT — ANXIETY QUESTIONNAIRES: GAD7 TOTAL SCORE: 0

## 2019-04-11 ASSESSMENT — LIFESTYLE VARIABLES: HOW OFTEN DO YOU HAVE A DRINK CONTAINING ALCOHOL: 0

## 2019-04-11 NOTE — PROGRESS NOTES
(CYANOCOBALAMIN) 500 MCG tablet Take 500 mcg by mouth daily. Yes Historical Provider, MD      Diagnosis Date    Hypertension     Osteoarthritis     Osteoporosis     Primary lateral [facility] ([facility]      Past Surgical History:   Procedure Laterality Date    APPENDECTOMY  11/1977    CATARACT REMOVAL Bilateral 2018    HYSTERECTOMY, TOTAL ABDOMINAL  1977    ovaries remain    TONSILLECTOMY AND ADENOIDECTOMY  1946       Family History   Problem Relation Age of Onset   Karina Calvillo Migraines Mother     Heart Disease Mother         valvular    Migraines Father     Coronary Art Dis Father     Other Father         COPD    Cancer Brother         throat    Breast Cancer Maternal Aunt        CareTeam (Including outside providers/suppliers regularly involved in providing care):   Patient Care Team:  Lakisha Forte MD as PCP - Alondra Ramos MD as PCP - [facility] Attributed Provider    Wt Readings from Last 3 Encounters:   04/11/19 143 lb (64.9 kg)   10/11/18 141 lb (64 kg)   04/03/18 137 lb (62.1 kg)     Vitals:    04/11/19 1420   BP: 104/70   Pulse: 67   SpO2: 95%   Weight: 143 lb (64.9 kg)   Height: 5' 5\" (1.651 m)     Body mass index is 23.8 kg/m². Based upon direct observation of the patient, evaluation of cognition reveals see additional progress note. Patient's complete Health Risk Assessment and screening values have been reviewed and are found in Flowsheets. The following problems were reviewed today and where indicated follow up appointments were made and/or referrals ordered. Positive Risk Factor Screenings with Interventions:     No Positive Risk Factors identified today.     Personalized Preventive Plan   Current Health Maintenance Status  Immunization History   Administered Date(s) Administered    Influenza, High Dose (Fluzone 65 yrs and older) 10/03/2017, 10/11/2018    Pneumococcal 13-valent Conjugate ([id number]) 09/16/2015    Pneumococcal Polysaccharide ([id number]) 07/16/2009, 10/11/2018    Provider, MD   folic acid (FOLVITE) 1 MG tablet Take 1 mg by mouth daily Yes Historical Provider, MD   VITAMIN D-3 (COLECALCIFEROL) 400 UNITS TABS Take  by mouth daily. Yes Historical Provider, MD   vitamin B-12 (CYANOCOBALAMIN) 500 MCG tablet Take 500 mcg by mouth daily. Yes Historical Provider, MD     Past Medical History:   Diagnosis Date    Hypertension     Osteoarthritis     Osteoporosis     Primary lateral sclerosis ([address]      Past Surgical History:   Procedure Laterality Date    APPENDECTOMY  11/1977    CATARACT REMOVAL Bilateral 2018    HYSTERECTOMY, TOTAL ABDOMINAL  1977    ovaries remain    TONSILLECTOMY AND ADENOIDECTOMY  1946     Family History   Problem Relation Age of Onset   [place] Migraines Mother     Heart Disease Mother         valvular    Migraines Father     Coronary Art Dis Father     Other Father         COPD    Cancer Brother         throat    Breast Cancer Maternal Aunt        CareTeam (Including outside providers/suppliers regularly involved in providing care):   Patient Care Team:  Ethan Esposito MD as PCP - Eduardo Mccabe MD as PCP - [facility] Attributed Provider    Wt Readings from Last 3 Encounters:   04/11/19 143 lb (64.9 kg)   10/11/18 141 lb (64 kg)   04/03/18 137 lb (62.1 kg)     Vitals:    04/11/19 1420   BP: 104/70   Pulse: 67   SpO2: 95%   Weight: 143 lb (64.9 kg)   Height: 5' 5\" (1.651 m)     Body mass index is 23.8 kg/m². Based upon direct observation of the patient, evaluation of cognition reveals recent and remote memory intact. Patient's complete Health Risk Assessment and screening values have been reviewed and are found in Flowsheets. The following problems were reviewed today and where indicated follow up appointments were made and/or referrals ordered. Positive Risk Factor Screenings with Interventions:     No Positive Risk Factors identified today.     Personalized Preventive Plan   Current Health Maintenance Status  Immunization History

## 2019-04-11 NOTE — PATIENT INSTRUCTIONS
Personalized Preventive Plan for [place] - 4/11/2019  Medicare offers a range of preventive health benefits. Some of the tests and screenings are paid in full while other may be subject to a deductible, co-insurance, and/or copay. Some of these benefits include a comprehensive review of your medical history including lifestyle, illnesses that may run in your family, and various assessments and screenings as appropriate. After reviewing your medical record and screening and assessments performed today your provider may have ordered immunizations, labs, imaging, and/or referrals for you. A list of these orders (if applicable) as well as your Preventive Care list are included within your After Visit Summary for your review. Other Preventive Recommendations:    · A preventive eye exam performed by an eye specialist is recommended every 1-2 years to screen for glaucoma; cataracts, macular degeneration, and other eye disorders. · A preventive dental visit is recommended every 6 months. · Try to get at least 150 minutes of exercise per week or 10,000 steps per day on a pedometer . · Order or download the FREE \"Exercise & Physical Activity: Your Everyday Guide\" from The [employer]. Call 6-555.991.9907 or search [employer] online. · You need 5675-4946 mg of calcium and 0255-2939 IU of vitamin D per day. It is possible to meet your calcium requirement with diet alone, but a vitamin D supplement is usually necessary to meet this goal.  · When exposed to the sun, use a sunscreen that protects against both UVA and UVB radiation with an SPF of 30 or greater. Reapply every 2 to 3 hours or after sweating, drying off with a towel, or swimming. · Always wear a seat belt when traveling in a car. Always wear a helmet when riding a bicycle or motorcycle. Personalized Preventive Plan for [place] - 4/11/2019  Medicare offers a range of preventive health benefits.  Some of the tests and screenings are paid in full while other may be subject to a deductible, co-insurance, and/or copay. Some of these benefits include a comprehensive review of your medical history including lifestyle, illnesses that may run in your family, and various assessments and screenings as appropriate. After reviewing your medical record and screening and assessments performed today your provider may have ordered immunizations, labs, imaging, and/or referrals for you. A list of these orders (if applicable) as well as your Preventive Care list are included within your After Visit Summary for your review. Other Preventive Recommendations:    A preventive eye exam performed by an eye specialist is recommended every 1-2 years to screen for glaucoma; cataracts, macular degeneration, and other eye disorders. A preventive dental visit is recommended every 6 months. Try to get at least 150 minutes of exercise per week or 10,000 steps per day on a pedometer . Order or download the FREE \"Exercise & Physical Activity: Your Everyday Guide\" from The [employer]. Call 9-424.822.5770 or search [employer] online. You need 9191-8155 mg of calcium and 4498-8594 IU of vitamin D per day. It is possible to meet your calcium requirement with diet alone, but a vitamin D supplement is usually necessary to meet this goal.  When exposed to the sun, use a sunscreen that protects against both UVA and UVB radiation with an SPF of 30 or greater. Reapply every 2 to 3 hours or after sweating, drying off with a towel, or swimming. Always wear a seat belt when traveling in a car. Always wear a helmet when riding a bicycle or motorcycle. Personalized Preventive Plan for [place] - 4/11/2019  Medicare offers a range of preventive health benefits. Some of the tests and screenings are paid in full while other may be subject to a deductible, co-insurance, and/or copay.     Some of these benefits include a comprehensive review of your medical history including lifestyle, illnesses that may run in your family, and various assessments and screenings as appropriate. After reviewing your medical record and screening and assessments performed today your provider may have ordered immunizations, labs, imaging, and/or referrals for you. A list of these orders (if applicable) as well as your Preventive Care list are included within your After Visit Summary for your review. Other Preventive Recommendations:    A preventive eye exam performed by an eye specialist is recommended every 1-2 years to screen for glaucoma; cataracts, macular degeneration, and other eye disorders. A preventive dental visit is recommended every 6 months. Try to get at least 150 minutes of exercise per week or 10,000 steps per day on a pedometer . Order or download the FREE \"Exercise & Physical Activity: Your Everyday Guide\" from [employer]. Call 3-863.378.4361 or search [employer] online. You need 8588-7704 mg of calcium and 6242-9370 IU of vitamin D per day. It is possible to meet your calcium requirement with diet alone, but a vitamin D supplement is usually necessary to meet this goal.  When exposed to the sun, use a sunscreen that protects against both UVA and UVB radiation with an SPF of 30 or greater. Reapply every 2 to 3 hours or after sweating, drying off with a towel, or swimming. Always wear a seat belt when traveling in a car. Always wear a helmet when riding a bicycle or motorcycle.

## 2019-05-03 DIAGNOSIS — R93.7 ABNORMAL MRI, LUMBAR SPINE: Primary | ICD-10-CM

## 2019-05-13 DIAGNOSIS — R93.7 ABNORMAL MRI, LUMBAR SPINE: Primary | ICD-10-CM

## 2019-09-20 DIAGNOSIS — G12.23 PRIMARY LATERAL SCLEROSIS (HCC): Primary | ICD-10-CM

## 2019-09-20 RX ORDER — BACLOFEN 20 MG/1
20 TABLET ORAL 3 TIMES DAILY
Qty: 270 TABLET | Refills: 0 | OUTPATIENT
Start: 2019-09-20 | End: 2019-12-10 | Stop reason: SDUPTHER

## 2019-09-20 NOTE — TELEPHONE ENCOUNTER
[address] called the office regarding the patient's Baclofen. They stated that with the last refill they had given her a different generic from a different  and patient stated that this new one does not help her as much. There are no additional refills on the last Rx so they are asking for a new Rx. This was discussed with Dr. Iam Gutierrez and he is agreeable with this. Rx was called in to pharmacist Say Alcantara at [facility] in [place]. Patient has a follow up with Dr. Iam Gutierrez 11/21/19.

## 2019-10-07 DIAGNOSIS — I10 ESSENTIAL HYPERTENSION: ICD-10-CM

## 2019-10-07 RX ORDER — LISINOPRIL 10 MG/1
TABLET ORAL
Qty: 90 TABLET | Refills: 3 | Status: SHIPPED | OUTPATIENT
Start: 2019-10-07 | End: 2020-06-01 | Stop reason: SINTOL

## 2019-10-11 ENCOUNTER — OFFICE VISIT (OUTPATIENT)
Dept: FAMILY MEDICINE CLINIC | Age: 79
End: 2019-10-11
Payer: MEDICARE

## 2019-10-11 ENCOUNTER — HOSPITAL ENCOUNTER (OUTPATIENT)
Age: 79
Setting detail: SPECIMEN
Discharge: HOME | End: 2019-10-11
Payer: MEDICARE

## 2019-10-11 VITALS
WEIGHT: 145 LBS | HEART RATE: 57 BPM | OXYGEN SATURATION: 96 % | DIASTOLIC BLOOD PRESSURE: 78 MMHG | BODY MASS INDEX: 24.13 KG/M2 | SYSTOLIC BLOOD PRESSURE: 120 MMHG

## 2019-10-11 DIAGNOSIS — R32 URINARY INCONTINENCE, UNSPECIFIED TYPE: ICD-10-CM

## 2019-10-11 DIAGNOSIS — M81.0 AGE-RELATED OSTEOPOROSIS WITHOUT CURRENT PATHOLOGICAL FRACTURE: ICD-10-CM

## 2019-10-11 DIAGNOSIS — I10 ESSENTIAL HYPERTENSION: Primary | ICD-10-CM

## 2019-10-11 DIAGNOSIS — Z23 NEED FOR SHINGLES VACCINE: ICD-10-CM

## 2019-10-11 DIAGNOSIS — G12.23 PRIMARY LATERAL SCLEROSIS (HCC): ICD-10-CM

## 2019-10-11 DIAGNOSIS — I10 ESSENTIAL HYPERTENSION: ICD-10-CM

## 2019-10-11 LAB
ABSOLUTE EOS #: 0.1 K/UL (ref 0–0.4)
ABSOLUTE IMMATURE GRANULOCYTE: NORMAL K/UL (ref 0–0.3)
ABSOLUTE LYMPH #: 1.5 K/UL (ref 1–4.8)
ABSOLUTE MONO #: 0.5 K/UL (ref 0.1–1.2)
ALBUMIN SERPL-MCNC: 4.4 G/DL (ref 3.5–5.2)
ALBUMIN/GLOBULIN RATIO: 1.5 (ref 1–2.5)
ALP BLD-CCNC: 78 U/L (ref 35–104)
ALT SERPL-CCNC: 19 U/L (ref 5–33)
ANION GAP SERPL CALCULATED.3IONS-SCNC: 8 MMOL/L (ref 9–17)
AST SERPL-CCNC: 16 U/L
BASOPHILS # BLD: 1 % (ref 0–1)
BASOPHILS ABSOLUTE: 0 K/UL (ref 0–0.2)
BILIRUB SERPL-MCNC: 0.21 MG/DL (ref 0.3–1.2)
BUN BLDV-MCNC: 15 MG/DL (ref 8–23)
BUN/CREAT BLD: 26 (ref 9–20)
CALCIUM SERPL-MCNC: 9.7 MG/DL (ref 8.6–10.4)
CHLORIDE BLD-SCNC: 101 MMOL/L (ref 98–107)
CO2: 31 MMOL/L (ref 20–31)
CREAT SERPL-MCNC: 0.58 MG/DL (ref 0.5–0.9)
DIFFERENTIAL TYPE: NORMAL
EOSINOPHILS RELATIVE PERCENT: 2 % (ref 1–7)
GFR AFRICAN AMERICAN: >60 ML/MIN
GFR NON-AFRICAN AMERICAN: >60 ML/MIN
GFR SERPL CREATININE-BSD FRML MDRD: ABNORMAL ML/MIN/{1.73_M2}
GFR SERPL CREATININE-BSD FRML MDRD: ABNORMAL ML/MIN/{1.73_M2}
GLUCOSE BLD-MCNC: 102 MG/DL (ref 70–99)
HCT VFR BLD CALC: 42.9 % (ref 36–46)
HEMOGLOBIN: 14.5 G/DL (ref 12–16)
IMMATURE GRANULOCYTES: NORMAL %
LYMPHOCYTES # BLD: 22 % (ref 16–46)
MCH RBC QN AUTO: 31.1 PG (ref 26–34)
MCHC RBC AUTO-ENTMCNC: 33.9 G/DL (ref 31–37)
MCV RBC AUTO: 91.8 FL (ref 80–100)
MONOCYTES # BLD: 8 % (ref 4–11)
NRBC AUTOMATED: NORMAL PER 100 WBC
PDW BLD-RTO: 13.7 % (ref 11–14.5)
PLATELET # BLD: 292 K/UL (ref 140–450)
PLATELET ESTIMATE: NORMAL
PMV BLD AUTO: 9.5 FL (ref 6–12)
POTASSIUM SERPL-SCNC: 4.1 MMOL/L (ref 3.7–5.3)
RBC # BLD: 4.67 M/UL (ref 4–5.2)
RBC # BLD: NORMAL 10*6/UL
SEG NEUTROPHILS: 67 % (ref 43–77)
SEGMENTED NEUTROPHILS ABSOLUTE COUNT: 4.6 K/UL (ref 1.8–7.7)
SODIUM BLD-SCNC: 140 MMOL/L (ref 135–144)
TOTAL PROTEIN: 7.3 G/DL (ref 6.4–8.3)
WBC # BLD: 6.8 K/UL (ref 3.5–11)
WBC # BLD: NORMAL 10*3/UL

## 2019-10-11 PROCEDURE — 82306 VITAMIN D 25 HYDROXY: CPT

## 2019-10-11 PROCEDURE — 80053 COMPREHEN METABOLIC PANEL: CPT

## 2019-10-11 PROCEDURE — G0008 ADMIN INFLUENZA VIRUS VAC: HCPCS | Performed by: FAMILY MEDICINE

## 2019-10-11 PROCEDURE — 90653 IIV ADJUVANT VACCINE IM: CPT | Performed by: FAMILY MEDICINE

## 2019-10-11 PROCEDURE — 99214 OFFICE O/P EST MOD 30 MIN: CPT | Performed by: FAMILY MEDICINE

## 2019-10-11 PROCEDURE — 85025 COMPLETE CBC W/AUTO DIFF WBC: CPT

## 2019-10-11 PROCEDURE — 36415 COLL VENOUS BLD VENIPUNCTURE: CPT

## 2019-10-11 ASSESSMENT — ENCOUNTER SYMPTOMS
BACK PAIN: 1
CHEST TIGHTNESS: 0
ABDOMINAL DISTENTION: 0
BLOOD IN STOOL: 0
WHEEZING: 0
SHORTNESS OF BREATH: 0

## 2019-10-12 LAB — VITAMIN D 25-HYDROXY: 49 NG/ML (ref 30–100)

## 2019-11-16 ENCOUNTER — OFFICE VISIT (OUTPATIENT)
Dept: FAMILY MEDICINE CLINIC | Age: 79
End: 2019-11-16
Payer: MEDICARE

## 2019-11-16 VITALS — OXYGEN SATURATION: 97 % | SYSTOLIC BLOOD PRESSURE: 110 MMHG | DIASTOLIC BLOOD PRESSURE: 76 MMHG | HEART RATE: 64 BPM

## 2019-11-16 DIAGNOSIS — M54.50 ACUTE LEFT-SIDED LOW BACK PAIN WITHOUT SCIATICA: ICD-10-CM

## 2019-11-16 DIAGNOSIS — S20.222A BACK CONTUSION, LEFT, INITIAL ENCOUNTER: Primary | ICD-10-CM
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
## 2019-11-16 PROCEDURE — 99213 OFFICE O/P EST LOW 20 MIN: CPT | Performed by: NURSE PRACTITIONER

## 2019-11-16 RX ORDER — LIDOCAINE 50 MG/G
OINTMENT TOPICAL
Qty: 1 TUBE | Refills: 0 | Status: SHIPPED | OUTPATIENT
Start: 2019-11-16 | End: 2021-02-25

## 2019-11-16 ASSESSMENT — ENCOUNTER SYMPTOMS
NAUSEA: 0
BACK PAIN: 1
SHORTNESS OF BREATH: 0
VOMITING: 0
COUGH: 0

## 2019-11-19 ENCOUNTER — OFFICE VISIT (OUTPATIENT)
Dept: FAMILY MEDICINE CLINIC | Age: 79
End: 2019-11-19
Payer: MEDICARE

## 2019-11-19 VITALS
WEIGHT: 148 LBS | OXYGEN SATURATION: 92 % | SYSTOLIC BLOOD PRESSURE: 126 MMHG | BODY MASS INDEX: 24.63 KG/M2 | DIASTOLIC BLOOD PRESSURE: 78 MMHG | HEART RATE: 68 BPM

## 2019-11-19 DIAGNOSIS — B35.1 ONYCHOMYCOSIS: ICD-10-CM

## 2019-11-19 DIAGNOSIS — M54.50 ACUTE LEFT-SIDED LOW BACK PAIN WITHOUT SCIATICA: ICD-10-CM
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
## 2019-11-19 DIAGNOSIS — S20.222A BACK CONTUSION, LEFT, INITIAL ENCOUNTER: Primary | ICD-10-CM

## 2019-11-19 PROCEDURE — 99214 OFFICE O/P EST MOD 30 MIN: CPT | Performed by: FAMILY MEDICINE

## 2019-11-19 RX ORDER — TERBINAFINE HYDROCHLORIDE 250 MG/1
250 TABLET ORAL DAILY
Qty: 90 TABLET | Refills: 0 | Status: SHIPPED | OUTPATIENT
Start: 2019-11-19 | End: 2021-02-25

## 2019-11-19 ASSESSMENT — ENCOUNTER SYMPTOMS
BACK PAIN: 1
COUGH: 0
NAUSEA: 0
SHORTNESS OF BREATH: 0
VOMITING: 0

## 2019-11-20 ENCOUNTER — OFFICE VISIT (OUTPATIENT)
Dept: PODIATRY | Age: 79
End: 2019-11-20
Payer: MEDICARE

## 2019-11-20 VITALS
BODY MASS INDEX: 24.66 KG/M2 | SYSTOLIC BLOOD PRESSURE: 122 MMHG | HEART RATE: 60 BPM | RESPIRATION RATE: 18 BRPM | DIASTOLIC BLOOD PRESSURE: 68 MMHG | HEIGHT: 65 IN | WEIGHT: 148 LBS

## 2019-11-20 DIAGNOSIS — Q66.6 PES VALGUS OF LEFT FOOT: Primary | ICD-10-CM

## 2019-11-20 DIAGNOSIS — M79.672 FOOT PAIN, LEFT: ICD-10-CM

## 2019-11-20 DIAGNOSIS — L84 CORNS AND CALLOSITIES: ICD-10-CM

## 2019-11-20 DIAGNOSIS — B35.1 DERMATOPHYTOSIS OF NAIL: ICD-10-CM

## 2019-11-20 DIAGNOSIS — I70.203 ATHEROSCLEROSIS OF NATIVE ARTERY OF BOTH LOWER EXTREMITIES, WITH UNSPECIFIED PRESENCE OF CLINICAL MANIFESTATION (HCC): ICD-10-CM

## 2019-11-20 PROCEDURE — 11055 PARING/CUTG B9 HYPRKER LES 1: CPT | Performed by: PODIATRIST

## 2019-11-20 PROCEDURE — 11721 DEBRIDE NAIL 6 OR MORE: CPT | Performed by: PODIATRIST

## 2019-11-20 PROCEDURE — 99213 OFFICE O/P EST LOW 20 MIN: CPT | Performed by: PODIATRIST

## 2019-11-20 ASSESSMENT — ENCOUNTER SYMPTOMS: CONSTIPATION: 1

## 2019-11-21 DIAGNOSIS — S20.222A BACK CONTUSION, LEFT, INITIAL ENCOUNTER: ICD-10-CM
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
## 2019-11-21 DIAGNOSIS — M54.50 ACUTE LEFT-SIDED LOW BACK PAIN WITHOUT SCIATICA: ICD-10-CM

## 2019-12-10 ENCOUNTER — OFFICE VISIT (OUTPATIENT)
Dept: FAMILY MEDICINE CLINIC | Age: 79
End: 2019-12-10
Payer: MEDICARE

## 2019-12-10 VITALS
HEART RATE: 89 BPM | WEIGHT: 146 LBS | BODY MASS INDEX: 24.3 KG/M2 | DIASTOLIC BLOOD PRESSURE: 70 MMHG | SYSTOLIC BLOOD PRESSURE: 104 MMHG

## 2019-12-10 DIAGNOSIS — G12.23 PRIMARY LATERAL SCLEROSIS (HCC): ICD-10-CM

## 2019-12-10 DIAGNOSIS — S20.222D BACK CONTUSION, LEFT, SUBSEQUENT ENCOUNTER: Primary | ICD-10-CM

## 2019-12-10 DIAGNOSIS — B35.1 ONYCHOMYCOSIS: ICD-10-CM

## 2019-12-10 DIAGNOSIS — R32 URINARY INCONTINENCE, UNSPECIFIED TYPE: ICD-10-CM

## 2019-12-10 PROCEDURE — 99214 OFFICE O/P EST MOD 30 MIN: CPT | Performed by: FAMILY MEDICINE

## 2019-12-10 RX ORDER — BACLOFEN 20 MG/1
20 TABLET ORAL 3 TIMES DAILY
Qty: 270 TABLET | Refills: 3 | Status: SHIPPED | OUTPATIENT
Start: 2019-12-10 | End: 2020-03-10

## 2019-12-10 ASSESSMENT — ENCOUNTER SYMPTOMS
VOMITING: 0
BACK PAIN: 0
SHORTNESS OF BREATH: 0
COUGH: 0
NAUSEA: 0

## 2020-06-01 ENCOUNTER — OFFICE VISIT (OUTPATIENT)
Dept: FAMILY MEDICINE CLINIC | Age: 80
End: 2020-06-01
Payer: MEDICARE

## 2020-06-01 VITALS — SYSTOLIC BLOOD PRESSURE: 100 MMHG | DIASTOLIC BLOOD PRESSURE: 70 MMHG | HEART RATE: 61 BPM | OXYGEN SATURATION: 94 %

## 2020-06-01 PROCEDURE — 99214 OFFICE O/P EST MOD 30 MIN: CPT | Performed by: NURSE PRACTITIONER

## 2020-06-01 PROCEDURE — 99375 HOME HEALTH CARE SUPERVISION: CPT | Performed by: NURSE PRACTITIONER

## 2020-06-01 RX ORDER — BACLOFEN 20 MG/1
20 TABLET ORAL
COMMUNITY
Start: 2020-05-05

## 2020-06-01 ASSESSMENT — ENCOUNTER SYMPTOMS
SHORTNESS OF BREATH: 0
COUGH: 0
ABDOMINAL PAIN: 0
WHEEZING: 0

## 2020-06-01 ASSESSMENT — PATIENT HEALTH QUESTIONNAIRE - PHQ9
SUM OF ALL RESPONSES TO PHQ QUESTIONS 1-9: 0
SUM OF ALL RESPONSES TO PHQ9 QUESTIONS 1 & 2: 0
1. LITTLE INTEREST OR PLEASURE IN DOING THINGS: 0
SUM OF ALL RESPONSES TO PHQ QUESTIONS 1-9: 0
2. FEELING DOWN, DEPRESSED OR HOPELESS: 0

## 2020-06-01 NOTE — PROGRESS NOTES
Provider, MD   VITAMIN D-3 (COLECALCIFEROL) 400 UNITS TABS Take  by mouth daily. Yes Historical Provider, MD   vitamin B-12 (CYANOCOBALAMIN) 500 MCG tablet Take 500 mcg by mouth daily. Yes Historical Provider, MD        The patient has No Known Allergies. Past Medical History  Andres Watson  has a past medical history of Hypertension, Osteoarthritis, Osteoporosis, and Primary lateral sclerosis ([address] Past Surgical History  The patient  has a past surgical history that includes Appendectomy (11/1977); Tonsillectomy and adenoidectomy (1946); Hysterectomy, total abdominal (1977); and Cataract removal (Bilateral, 2018). Family History  This patient's family history includes Breast Cancer in her maternal aunt; Cancer in her brother; Coronary Art Dis in her father; Heart Disease in her mother; Migraines in her father and mother; Other in her father. Social History  Andres Watson  reports that she has never smoked. She has never used smokeless tobacco. She reports that she does not drink alcohol or use drugs. Health Maintenance:    Health Maintenance   Topic Date Due    DTaP/Tdap/Td vaccine (1 - Tdap) 01/11/1960    Shingles Vaccine (1 of 2) 01/11/1991    Annual Wellness Visit (AWV)  05/29/2019    Potassium monitoring  10/11/2020    Creatinine monitoring  10/11/2020    DEXA (modify frequency per FRAX score)  Completed    Flu vaccine  Completed    Pneumococcal 65+ years Vaccine  Completed    Hepatitis A vaccine  Aged Out    Hepatitis B vaccine  Aged Out    Hib vaccine  Aged Out    Meningococcal (ACWY) vaccine  Aged Out       Subjective:      Review of Systems   Reason unable to perform ROS: gait instability, worsening ambulation. Constitutional: Negative for chills, fatigue and fever. HENT: Negative for congestion. Respiratory: Negative for cough, shortness of breath and wheezing. Cardiovascular: Negative for chest pain, palpitations and leg swelling. Gastrointestinal: Negative for abdominal pain.

## 2020-06-15 ENCOUNTER — OFFICE VISIT (OUTPATIENT)
Dept: PODIATRY | Age: 80
End: 2020-06-15
Payer: MEDICARE

## 2020-06-15 VITALS
SYSTOLIC BLOOD PRESSURE: 120 MMHG | WEIGHT: 149 LBS | RESPIRATION RATE: 20 BRPM | HEART RATE: 68 BPM | DIASTOLIC BLOOD PRESSURE: 70 MMHG | BODY MASS INDEX: 24.79 KG/M2

## 2020-06-15 PROCEDURE — 11055 PARING/CUTG B9 HYPRKER LES 1: CPT | Performed by: PODIATRIST

## 2020-06-15 PROCEDURE — 11721 DEBRIDE NAIL 6 OR MORE: CPT | Performed by: PODIATRIST

## 2020-06-15 NOTE — PROGRESS NOTES
Foot Care Worksheet  PCP: Naseem Lorenzana, APRN - CNP  Last visit: 06/01/2020    Nail description:  Thick , Yellow , Crumbly , Marked limitation of ambulation     Pain resulting from thickened and dystrophy of nail plate No    Nails involved  Right   1, 2, 3, 4, 5  (T5-T9)  Left     1, 2, 3, 4, 5  (TA-T4)    Q7 1 Class A Finding - Non traumatic amputation of foot No    Q8 2 Class B Findings - Absent DP pulse No, Absent PT pulse No, Advanced tropic changes (3 required) Yes    Decrease hair growth Yes, Nail changes/thickening Yes, Pigmented changes/discoloration Yes, Skin texture (thin, shiny) Yes, Skin color (rubor/redness) No    Q9 1 Class B and 2 Class C Findings  Claudication No, Temperature change Yes, Paresthesia No, Burning No, Edema Yes

## 2020-06-22 ENCOUNTER — OFFICE VISIT (OUTPATIENT)
Dept: FAMILY MEDICINE CLINIC | Age: 80
End: 2020-06-22
Payer: MEDICARE

## 2020-06-22 VITALS
WEIGHT: 148 LBS | HEART RATE: 61 BPM | BODY MASS INDEX: 24.63 KG/M2 | SYSTOLIC BLOOD PRESSURE: 104 MMHG | DIASTOLIC BLOOD PRESSURE: 60 MMHG | OXYGEN SATURATION: 97 %

## 2020-06-22 PROBLEM — R26.81 GAIT INSTABILITY: Status: ACTIVE | Noted: 2020-06-22

## 2020-06-22 PROCEDURE — 99214 OFFICE O/P EST MOD 30 MIN: CPT | Performed by: NURSE PRACTITIONER

## 2020-06-22 SDOH — ECONOMIC STABILITY: FOOD INSECURITY: WITHIN THE PAST 12 MONTHS, YOU WORRIED THAT YOUR FOOD WOULD RUN OUT BEFORE YOU GOT MONEY TO BUY MORE.: PATIENT DECLINED

## 2020-06-22 SDOH — ECONOMIC STABILITY: INCOME INSECURITY: HOW HARD IS IT FOR YOU TO PAY FOR THE VERY BASICS LIKE FOOD, HOUSING, MEDICAL CARE, AND HEATING?: PATIENT DECLINED

## 2020-06-22 SDOH — ECONOMIC STABILITY: TRANSPORTATION INSECURITY
IN THE PAST 12 MONTHS, HAS THE LACK OF TRANSPORTATION KEPT YOU FROM MEDICAL APPOINTMENTS OR FROM GETTING MEDICATIONS?: PATIENT DECLINED

## 2020-06-22 SDOH — ECONOMIC STABILITY: FOOD INSECURITY: WITHIN THE PAST 12 MONTHS, THE FOOD YOU BOUGHT JUST DIDN'T LAST AND YOU DIDN'T HAVE MONEY TO GET MORE.: PATIENT DECLINED

## 2020-06-22 SDOH — ECONOMIC STABILITY: TRANSPORTATION INSECURITY
IN THE PAST 12 MONTHS, HAS LACK OF TRANSPORTATION KEPT YOU FROM MEETINGS, WORK, OR FROM GETTING THINGS NEEDED FOR DAILY LIVING?: PATIENT DECLINED

## 2020-06-22 ASSESSMENT — ENCOUNTER SYMPTOMS
COUGH: 0
SHORTNESS OF BREATH: 0
WHEEZING: 0
ABDOMINAL PAIN: 0
BACK PAIN: 0

## 2020-06-22 NOTE — PROGRESS NOTES
[address] [address]  Dept: [phone]  Dept Fax: [phone]  Loc: [phone]     Visit Date:  6/22/2020    Patient:  Tiarra Thomas  YOB: 1940    HPI:     Chief Complaint   Patient presents with    Follow-up     pt reports she is feeling weaker is doing PT and will start OT in a week or two,bed sore has resolved, son reports in the morning when pt wakes up she will be shakes and wants to make sure nothing of concern       HPI  Pressure Sores resolved after moving in with son and dgtr in law, help with transport and adls  HTN controlled, stable  Gait instability and weakness  Is here and accompanied by her son, in a wheelchair. Son states that she is staying in their home. She is getting therapy both physical and occupational help and evaluations. Son is thinking that she will need to be placed in an extended care facility. Patient is very reluctant and not unrealistic according to the son and wanting to go back to her own home. Feels that if she she is weak enough and unable to stand. Unsafe in her own home. Medications  Prior to Visit Medications    Medication Sig Taking? Authorizing Provider   baclofen (LIORESAL) 20 MG tablet TAKE 1 TABLET BY MOUTH THREE TIMES DAILY Yes Historical Provider, MD   Multiple Vitamins-Minerals (VITAMIN D3 COMPLETE PO) Take by mouth Yes Historical Provider, MD   terbinafine (LAMISIL) 250 MG tablet Take 1 tablet by mouth daily Yes Dafne Sharma MD   lidocaine (XYLOCAINE) 5 % ointment Apply topically as needed.  Yes HIEU Carlisle - CNP   oxybutynin (OXYTROL) 3.9 MG/24HR Place 1 patch onto the skin Twice a Week Yes Dafne Sharma MD   aspirin 81 MG tablet Take 81 mg by mouth daily Yes Historical Provider, MD   folic acid (FOLVITE) 1 MG tablet Take 1 mg by mouth daily Yes Historical Provider, MD   VITAMIN D-3 (COLECALCIFEROL) dizziness. Psychiatric/Behavioral: Negative for agitation and confusion. Objective:     /60   Pulse 61   Wt 148 lb (67.1 kg)   SpO2 97%   BMI 24.63 kg/m²     Physical Exam  Vitals signs and nursing note (Exam in wheelchair.) reviewed. Exam conducted with a chaperone present (Is here with her son). Constitutional:       Appearance: Normal appearance. Cardiovascular:      Rate and Rhythm: Normal rate and regular rhythm. Heart sounds: S1 normal and S2 normal.   Pulmonary:      Effort: Pulmonary effort is normal.      Breath sounds: Normal breath sounds. Abdominal:      General: Bowel sounds are normal.      Palpations: Abdomen is soft. Musculoskeletal:      Right lower leg: No edema. Left lower leg: No edema. Skin:     General: Skin is warm. Neurological:      Mental Status: She is alert. Psychiatric:         Attention and Perception: Attention normal.         Mood and Affect: Mood normal.         Behavior: Behavior is cooperative. Judgment: Judgment normal.         Labs Reviewed 6/22/2020:    Lab Results   Component Value Date    WBC 6.8 10/11/2019    HGB 14.5 10/11/2019    HCT 42.9 10/11/2019     10/11/2019    CHOL 138 02/26/2013    HDL 40 02/26/2013    ALT 19 10/11/2019    AST 16 10/11/2019     10/11/2019    K 4.1 10/11/2019     10/11/2019    CREATININE 0.58 10/11/2019    BUN 15 10/11/2019    CO2 31 10/11/2019       Assessment/Plan      1. Gait instability  Urged her son to continue to monitor home care, physical therapy and to seek out guidance as far as area extended-care facilities    2. Essential hypertension  Controlled with low-sodium diet. .  Recheck in 3 to 4 months. - Comprehensive Metabolic Panel; Future  - TSH without Reflex; Future  - CBC Auto Differential; Future  - Lipid Panel; Future    3. Urinary incontinence, unspecified type  Chronic, unchanged      Return in about 8 weeks (around 8/17/2020).     Patient given educational materials

## 2020-07-15 ENCOUNTER — TELEPHONE (OUTPATIENT)
Dept: FAMILY MEDICINE CLINIC | Age: 80
End: 2020-07-15

## 2020-07-15 NOTE — TELEPHONE ENCOUNTER
Patient son contacted office requesting an order for Covid testing to be completed at [facility]. Patient is moving into [facility] and is required to have covid testing before moving in.

## 2020-07-16 ENCOUNTER — HOSPITAL ENCOUNTER (OUTPATIENT)
Age: 80
Setting detail: SPECIMEN
Discharge: HOME | End: 2020-07-16
Payer: MEDICARE

## 2020-07-16 LAB
ABSOLUTE EOS #: 0.13 K/UL (ref 0–0.44)
ABSOLUTE IMMATURE GRANULOCYTE: <0.03 K/UL (ref 0–0.3)
ABSOLUTE LYMPH #: 1.51 K/UL (ref 1.1–3.7)
ABSOLUTE MONO #: 0.43 K/UL (ref 0.1–1.2)
ALBUMIN SERPL-MCNC: 3.7 G/DL (ref 3.5–5.2)
ALBUMIN/GLOBULIN RATIO: 1.4 (ref 1–2.5)
ALP BLD-CCNC: 65 U/L (ref 35–104)
ALT SERPL-CCNC: 14 U/L (ref 5–33)
ANION GAP SERPL CALCULATED.3IONS-SCNC: 12 MMOL/L (ref 9–17)
AST SERPL-CCNC: 14 U/L
BASOPHILS # BLD: 0 % (ref 0–2)
BASOPHILS ABSOLUTE: <0.03 K/UL (ref 0–0.2)
BILIRUB SERPL-MCNC: 0.41 MG/DL (ref 0.3–1.2)
BUN BLDV-MCNC: 12 MG/DL (ref 8–23)
BUN/CREAT BLD: 17 (ref 9–20)
CALCIUM SERPL-MCNC: 8.8 MG/DL (ref 8.6–10.4)
CHLORIDE BLD-SCNC: 103 MMOL/L (ref 98–107)
CHOLESTEROL/HDL RATIO: 4.6
CHOLESTEROL: 142 MG/DL
CO2: 27 MMOL/L (ref 20–31)
CREAT SERPL-MCNC: 0.69 MG/DL (ref 0.5–0.9)
DIFFERENTIAL TYPE: ABNORMAL
EOSINOPHILS RELATIVE PERCENT: 2 % (ref 1–4)
GFR AFRICAN AMERICAN: >60 ML/MIN
GFR NON-AFRICAN AMERICAN: >60 ML/MIN
GFR SERPL CREATININE-BSD FRML MDRD: ABNORMAL ML/MIN/{1.73_M2}
GFR SERPL CREATININE-BSD FRML MDRD: ABNORMAL ML/MIN/{1.73_M2}
GLUCOSE BLD-MCNC: 113 MG/DL (ref 70–99)
HCT VFR BLD CALC: 42.5 % (ref 36.3–47.1)
HDLC SERPL-MCNC: 31 MG/DL
HEMOGLOBIN: 14.1 G/DL (ref 11.9–15.1)
IMMATURE GRANULOCYTES: 0 %
LDL CHOLESTEROL: 85 MG/DL (ref 0–130)
LYMPHOCYTES # BLD: 22 % (ref 24–43)
MCH RBC QN AUTO: 30.3 PG (ref 25.2–33.5)
MCHC RBC AUTO-ENTMCNC: 33.2 G/DL (ref 25.2–33.5)
MCV RBC AUTO: 91.4 FL (ref 82.6–102.9)
MONOCYTES # BLD: 6 % (ref 3–12)
NRBC AUTOMATED: 0 PER 100 WBC
PDW BLD-RTO: 12.8 % (ref 11.8–14.4)
PLATELET # BLD: 247 K/UL (ref 138–453)
PLATELET ESTIMATE: ABNORMAL
PMV BLD AUTO: 11.9 FL (ref 8.1–13.5)
POTASSIUM SERPL-SCNC: 3.9 MMOL/L (ref 3.7–5.3)
RBC # BLD: 4.65 M/UL (ref 3.95–5.11)
RBC # BLD: ABNORMAL 10*6/UL
SEG NEUTROPHILS: 70 % (ref 36–65)
SEGMENTED NEUTROPHILS ABSOLUTE COUNT: 4.89 K/UL (ref 1.5–8.1)
SODIUM BLD-SCNC: 142 MMOL/L (ref 135–144)
TOTAL PROTEIN: 6.4 G/DL (ref 6.4–8.3)
TRIGL SERPL-MCNC: 128 MG/DL
TSH SERPL DL<=0.05 MIU/L-ACNC: 0.45 MIU/L (ref 0.3–5)
VLDLC SERPL CALC-MCNC: ABNORMAL MG/DL (ref 1–30)
WBC # BLD: 7 K/UL (ref 3.5–11.3)
WBC # BLD: ABNORMAL 10*3/UL

## 2020-07-16 PROCEDURE — 80061 LIPID PANEL: CPT

## 2020-07-16 PROCEDURE — 85025 COMPLETE CBC W/AUTO DIFF WBC: CPT

## 2020-07-16 PROCEDURE — 84443 ASSAY THYROID STIM HORMONE: CPT

## 2020-07-16 PROCEDURE — 36415 COLL VENOUS BLD VENIPUNCTURE: CPT

## 2020-07-16 PROCEDURE — 80053 COMPREHEN METABOLIC PANEL: CPT

## 2020-09-03 ENCOUNTER — TELEPHONE (OUTPATIENT)
Dept: FAMILY MEDICINE CLINIC | Age: 80
End: 2020-09-03
Payer: MEDICARE

## 2020-09-03 ENCOUNTER — HOSPITAL ENCOUNTER (OUTPATIENT)
Age: 80
Discharge: HOME | End: 2020-09-03
Payer: MEDICARE

## 2020-09-03 PROCEDURE — 93010 ELECTROCARDIOGRAM REPORT: CPT | Performed by: FAMILY MEDICINE

## 2020-09-03 PROCEDURE — 93005 ELECTROCARDIOGRAM TRACING: CPT | Performed by: FAMILY MEDICINE

## 2020-09-03 RX ORDER — FUROSEMIDE 20 MG/1
20 TABLET ORAL DAILY
Qty: 90 TABLET | Refills: 1 | Status: SHIPPED | OUTPATIENT
Start: 2020-09-03

## 2020-09-03 NOTE — TELEPHONE ENCOUNTER
Would recommend furosemide 20 mg daily elevation of legs as much as possible. Would also like her to get an EKG today. Labs today preferably or tomorrow at the latest which include a CBC and a BN P a comprehensive and a CBC. Review of the chart show that these labs were normal last month but with the abrupt change in her condition would recommend reevaluation. Echocardiogram also ordered and I would like the patient to follow-up within the next 7 to 10 days with PCP to review results and further evaluate.   If chest pain and significant increase in shortness of breath or significant change in vitals then would recommend urgent evaluation can call or emergency room for this

## 2020-09-04 LAB
ALBUMIN/GLOBULIN RATIO: 1.38 G/DL
ALBUMIN: 3.6 G/DL (ref 3.5–5)
ALP BLD-CCNC: 69 UNITS/L (ref 38–126)
ALT SERPL-CCNC: 18 UNITS/L (ref 4–35)
ANION GAP SERPL CALCULATED.3IONS-SCNC: 7.2 MMOL/L
AST SERPL-CCNC: 18 UNITS/L (ref 14–36)
BASOPHILS %: 0.57 (ref 0–3)
BASOPHILS ABSOLUTE: 0.04 (ref 0–0.3)
BILIRUB SERPL-MCNC: 0.5 MG/DL (ref 0.2–1.3)
BUN BLDV-MCNC: 13 MG/DL (ref 7–17)
CALCIUM SERPL-MCNC: 9 MG/DL (ref 8.4–10.2)
CHLORIDE BLD-SCNC: 104 MMOL/L (ref 98–120)
CO2: 28 MMOL/L (ref 22–31)
CREAT SERPL-MCNC: 0.8 MG/DL (ref 0.5–1)
EOSINOPHILS %: 4.15 (ref 0–10)
EOSINOPHILS ABSOLUTE: 0.29 (ref 0–1.1)
GFR CALCULATED: > 60
GLOBULIN: 2.6 G/DL
GLUCOSE: 88 MG/DL (ref 65–105)
HCT VFR BLD CALC: 43.9 % (ref 37–47)
HEMOGLOBIN: 14.5 (ref 12–16)
LYMPHOCYTE %: 22.11 (ref 20–51.1)
LYMPHOCYTES ABSOLUTE: 1.54 (ref 1–5.5)
MCH RBC QN AUTO: 30.5 PG (ref 28.5–32.5)
MCHC RBC AUTO-ENTMCNC: 32.9 G/DL (ref 32–37)
MCV RBC AUTO: 92.7 FL (ref 80–94)
MONOCYTES %: 6.86 (ref 1.7–9.3)
MONOCYTES ABSOLUTE: 0.48 (ref 0.1–1)
NEUTROPHILS %: 66.31 (ref 42.2–75.2)
NEUTROPHILS ABSOLUTE: 4.63 (ref 2–8.1)
PDW BLD-RTO: 12.1 % (ref 8.5–15.5)
PLATELET # BLD: 247.5 THOU/MM3 (ref 130–400)
POTASSIUM SERPL-SCNC: 4.6 MMOL/L (ref 3.6–5)
RBC: 4.74 M/UL (ref 4.2–5.4)
SODIUM BLD-SCNC: 140 MMOL/L (ref 135–145)
TOTAL PROTEIN, SERUM: 6.2 G/DL (ref 6.3–8.2)
WBC: 7 THOU/ML3 (ref 4.8–10.8)

## 2020-09-09 ENCOUNTER — TELEPHONE (OUTPATIENT)
Dept: FAMILY MEDICINE CLINIC | Age: 80
End: 2020-09-09

## 2020-09-09 NOTE — TELEPHONE ENCOUNTER
Nani at [facility] called stating patient is having an increase in incontinence. She is resisting using briefs and she is refusing hygiene most of the time. She just wanted provider to be aware.

## 2020-09-11 NOTE — TELEPHONE ENCOUNTER
Ok, please let me know if there is anything that I could order different to help with nursing and ADL care

## 2020-09-28 ENCOUNTER — TELEPHONE (OUTPATIENT)
Dept: FAMILY MEDICINE CLINIC | Age: 80
End: 2020-09-28

## 2020-09-30 NOTE — TELEPHONE ENCOUNTER
I called and spoke with Harland Boast with [facility]. She states that the patient has a red area on her left posterior leg that she was picking at. Patient received shower today and they put a bandage on the area. They state they will call if they have any further concerns.

## 2020-10-12 LAB
ALBUMIN/GLOBULIN RATIO: 1.26 G/DL
ALBUMIN: 3.4 G/DL (ref 3.5–5)
ALP BLD-CCNC: 67 UNITS/L (ref 38–126)
ALT SERPL-CCNC: 19 UNITS/L (ref 4–35)
ANION GAP SERPL CALCULATED.3IONS-SCNC: 7 MMOL/L
AST SERPL-CCNC: 17 UNITS/L (ref 14–36)
BASOPHILS %: 0.59 (ref 0–3)
BASOPHILS ABSOLUTE: 0.04 (ref 0–0.3)
BILIRUB SERPL-MCNC: 0.4 MG/DL (ref 0.2–1.3)
BUN BLDV-MCNC: 14 MG/DL (ref 7–17)
CALCIUM SERPL-MCNC: 8.5 MG/DL (ref 8.4–10.2)
CHLORIDE BLD-SCNC: 106 MMOL/L (ref 98–120)
CHOLESTEROL/HDL RATIO: 5.56 RATIO (ref 0–4.5)
CHOLESTEROL: 139 MG/DL (ref 50–200)
CO2: 28 MMOL/L (ref 22–31)
CREAT SERPL-MCNC: 0.7 MG/DL (ref 0.5–1)
EOSINOPHILS %: 3.94 (ref 0–10)
EOSINOPHILS ABSOLUTE: 0.27 (ref 0–1.1)
GFR CALCULATED: > 60
GLOBULIN: 2.7 G/DL
GLUCOSE: 91 MG/DL (ref 65–105)
HCT VFR BLD CALC: 44.3 % (ref 37–47)
HDLC SERPL-MCNC: 25 MG/DL (ref 36–68)
HEMOGLOBIN: 14.6 (ref 12–16)
LDL CHOLESTEROL CALCULATED: 87.8 MG/DL (ref 0–160)
LYMPHOCYTE %: 26.78 (ref 20–51.1)
LYMPHOCYTES ABSOLUTE: 1.84 (ref 1–5.5)
MCH RBC QN AUTO: 29.9 PG (ref 28.5–32.5)
MCHC RBC AUTO-ENTMCNC: 33 G/DL (ref 32–37)
MCV RBC AUTO: 90.8 FL (ref 80–94)
MONOCYTES %: 7.72 (ref 1.7–9.3)
MONOCYTES ABSOLUTE: 0.53 (ref 0.1–1)
NEUTROPHILS %: 60.97 (ref 42.2–75.2)
NEUTROPHILS ABSOLUTE: 4.2 (ref 2–8.1)
PDW BLD-RTO: 11.7 % (ref 8.5–15.5)
PLATELET # BLD: 233.7 THOU/MM3 (ref 130–400)
POTASSIUM SERPL-SCNC: 3.9 MMOL/L (ref 3.6–5)
RBC: 4.88 M/UL (ref 4.2–5.4)
SODIUM BLD-SCNC: 140 MMOL/L (ref 135–145)
TOTAL PROTEIN, SERUM: 6.1 G/DL (ref 6.3–8.2)
TRIGL SERPL-MCNC: 131 MG/DL (ref 10–250)
TSH SERPL DL<=0.05 MIU/L-ACNC: 0.9 MIU/ML (ref 0.49–4.67)
VLDLC SERPL CALC-MCNC: 26 MG/DL (ref 0–50)
WBC: 6.9 THOU/ML3 (ref 4.8–10.8)

## 2020-10-16 ENCOUNTER — TELEPHONE (OUTPATIENT)
Dept: FAMILY MEDICINE CLINIC | Age: 80
End: 2020-10-16

## 2020-10-16 NOTE — TELEPHONE ENCOUNTER
[address] [place] [address] is requesting order for PT/OT. Patient has problems ambulating- feet don't move well.   Their phone # is [phone]

## 2020-10-26 ENCOUNTER — TELEPHONE (OUTPATIENT)
Dept: FAMILY MEDICINE CLINIC | Age: 80
End: 2020-10-26

## 2020-10-28 ENCOUNTER — TELEPHONE (OUTPATIENT)
Dept: FAMILY MEDICINE CLINIC | Age: 80
End: 2020-10-28

## 2020-10-28 RX ORDER — CEFUROXIME AXETIL 500 MG/1
500 TABLET ORAL 2 TIMES DAILY
Qty: 14 TABLET | Refills: 0 | Status: SHIPPED | OUTPATIENT
Start: 2020-10-28 | End: 2020-11-04

## 2020-12-30 ENCOUNTER — TELEPHONE (OUTPATIENT)
Dept: FAMILY MEDICINE CLINIC | Age: 80
End: 2020-12-30

## 2021-01-04 ENCOUNTER — TELEPHONE (OUTPATIENT)
Dept: FAMILY MEDICINE CLINIC | Age: 81
End: 2021-01-04

## 2021-01-08 ENCOUNTER — TELEPHONE (OUTPATIENT)
Dept: FAMILY MEDICINE CLINIC | Age: 81
End: 2021-01-08

## 2021-01-08 DIAGNOSIS — R26.81 GAIT INSTABILITY: Primary | ICD-10-CM

## 2021-02-25 ENCOUNTER — TELEPHONE (OUTPATIENT)
Dept: INTERNAL MEDICINE | Age: 81
End: 2021-02-25

## 2021-02-25 ENCOUNTER — OUTSIDE SERVICES (OUTPATIENT)
Dept: INTERNAL MEDICINE | Age: 81
End: 2021-02-25
Payer: MEDICARE

## 2021-02-25 VITALS
SYSTOLIC BLOOD PRESSURE: 135 MMHG | RESPIRATION RATE: 16 BRPM | OXYGEN SATURATION: 93 % | HEART RATE: 63 BPM | TEMPERATURE: 97.6 F | DIASTOLIC BLOOD PRESSURE: 66 MMHG

## 2021-02-25 DIAGNOSIS — I49.9 IRREGULAR HEART RATE: ICD-10-CM

## 2021-02-25 DIAGNOSIS — E55.9 VITAMIN D DEFICIENCY: ICD-10-CM

## 2021-02-25 DIAGNOSIS — R32 URINARY INCONTINENCE, UNSPECIFIED TYPE: ICD-10-CM

## 2021-02-25 DIAGNOSIS — I10 ESSENTIAL HYPERTENSION: Primary | ICD-10-CM

## 2021-02-25 DIAGNOSIS — G12.23 PRIMARY LATERAL SCLEROSIS (HCC): ICD-10-CM

## 2021-02-25 DIAGNOSIS — R26.81 GAIT INSTABILITY: ICD-10-CM

## 2021-02-25 RX ORDER — OXYBUTYNIN CHLORIDE 5 MG/1
5 TABLET, EXTENDED RELEASE ORAL DAILY
COMMUNITY

## 2021-02-25 ASSESSMENT — ENCOUNTER SYMPTOMS
SHORTNESS OF BREATH: 0
VOMITING: 0
CHEST TIGHTNESS: 0
SORE THROAT: 0
DIARRHEA: 0
NAUSEA: 0

## 2021-02-25 NOTE — PROGRESS NOTES
[facility]      Gianni Isaac is a [de-identified] y.o. female resident of [address] [place] [address]  who presents today for medical conditions/complaints as noted below. HPI:     HPI  Patient presents to establish care and for evaluation and management of chronic medical conditions as noted below. Hypertension well controlled on lasix. Denies chest pain or dyspnea. Primary lateral sclerosis causes some gait instability. She notes this is particularly prominent in her left leg. Last neurology follow up was 2018 with Dr. Tara Aguilar. We did discuss scheduling a follow up soon. Vitamin D stable on supplement. Last recheck serum vit D was 10/2019    Continues on oxybutynin for urinary incontinence    Upon exam, 2 skipped beats were noted, but otherwise regular rate and rhythm. Patient and staff are not aware of paroxysmal atrial fibrillation or any prior arrhythmia. Current Outpatient Medications   Medication Sig Dispense Refill    Menthol-Zinc Oxide (CALMOSEPTINE EX) topically to bilateral inner buttocks every shift      oxybutynin (DITROPAN-XL) 5 MG extended release tablet Take 5 mg by mouth daily      furosemide (LASIX) 20 MG tablet Take 1 tablet by mouth daily 90 tablet 1    baclofen (LIORESAL) 20 MG tablet Take 20 mg by mouth every 8 hours       aspirin 81 MG tablet Take 81 mg by mouth daily      folic acid (FOLVITE) 1 MG tablet Take 1 mg by mouth daily      VITAMIN D-3 (COLECALCIFEROL) 400 UNITS TABS Take  by mouth daily.  vitamin B-12 (CYANOCOBALAMIN) 500 MCG tablet Take 500 mcg by mouth daily. No current facility-administered medications for this visit.       No Known Allergies    Health Maintenance   Topic Date Due    COVID-19 Vaccine (1 of 2) 01/11/1957    DTaP/Tdap/Td vaccine (1 - Tdap) 01/11/1960    Shingles Vaccine (1 of 2) 01/11/1991    Annual Wellness Visit (AWV)  05/29/2019    Flu vaccine (1) 09/01/2020    Potassium monitoring  10/12/2021  Creatinine monitoring  10/12/2021    DEXA (modify frequency per FRAX score)  Completed    Pneumococcal 65+ years Vaccine  Completed    Hepatitis A vaccine  Aged Out    Hepatitis B vaccine  Aged Out    Hib vaccine  Aged Out    Meningococcal (ACWY) vaccine  Aged Out       Subjective:      Review of Systems   Constitutional: Negative for chills and fever. HENT: Negative for congestion and sore throat. Respiratory: Negative for chest tightness and shortness of breath. Cardiovascular: Negative for chest pain and leg swelling. Gastrointestinal: Negative for diarrhea, nausea and vomiting. Genitourinary: Negative for difficulty urinating and dysuria. Musculoskeletal: Negative for gait problem and myalgias. Neurological: Negative for dizziness and headaches. Psychiatric/Behavioral: Negative for confusion and decreased concentration. Due to patient's clinical status, ROS of symptoms was completed by discussion with long term care facility staff, as well as with input from patient. Objective:     Vitals:    02/25/21 0800   BP: 135/66   Pulse: 63   Resp: 16   Temp: 97.6 °F (36.4 °C)   SpO2: 93%     Physical Exam  Vitals signs and nursing note reviewed. Constitutional:       General: She is not in acute distress. Appearance: She is well-developed. HENT:      Head: Normocephalic and atraumatic. Right Ear: External ear normal.      Left Ear: External ear normal.   Eyes:      General: Lids are normal.      Extraocular Movements: Extraocular movements intact. Conjunctiva/sclera: Conjunctivae normal.   Neck:      Musculoskeletal: Neck supple. Thyroid: No thyromegaly. Cardiovascular:      Rate and Rhythm: Normal rate. Heart sounds: No murmur. Comments: Did note 2 extra skipped beats, otherwise regular rate and rhythm  Pulmonary:      Effort: Pulmonary effort is normal. No accessory muscle usage or respiratory distress.

## 2021-02-25 NOTE — TELEPHONE ENCOUNTER
Please fax completed progress note from today. Can send this phone encounter for following orders as well:    1. Home health  2. Advise follow up with neurology for her primary lateral sclerosis; can place additional referral if too much time has lapsed since her last visit  3.  Please advise if stat EKG from mobile X has resulted yet    Electronically signed by RUPERTO Courtney on 2/25/2021 at 4:30 PM

## 2021-02-26 NOTE — TELEPHONE ENCOUNTER
Epic is currently unavailable off site, can only access haiku via phone and therefore cannot see EKG  Can you tell me if the summary says normal sinus rhythm, any arrhythmias?

## 2021-02-26 NOTE — TELEPHONE ENCOUNTER
EKG is extremely difficult to read due to poor quality - does [facility] have this available on site when I am out there next week? As long as only occasional PVCs, will monitor. Advise to consider holter to determine frequency. Will order if patient/ family agreeable.

## 2021-03-01 NOTE — TELEPHONE ENCOUNTER
[facility] He will discuss holter monitor with brothers and let us know.

## 2021-03-02 ENCOUNTER — TELEPHONE (OUTPATIENT)
Dept: INTERNAL MEDICINE | Age: 81
End: 2021-03-02

## 2021-03-02 DIAGNOSIS — I49.9 IRREGULAR HEART RATE: Primary | ICD-10-CM

## 2021-03-02 NOTE — TELEPHONE ENCOUNTER
Scheduled: 3/16/21 at 3 pm- ON                     3/18/21 at 3 pm- OFF    Marcie 12 notified per fax.

## 2021-03-02 NOTE — TELEPHONE ENCOUNTER
Per [facility] fax, family declines neurology follow-up, are agreeable to proceed with Holter monitor. Order placed.

## 2021-03-03 RX ORDER — ASPIRIN 81 MG/1
81 TABLET, CHEWABLE ORAL DAILY
COMMUNITY

## 2021-03-04 ENCOUNTER — TELEPHONE (OUTPATIENT)
Dept: FAMILY MEDICINE CLINIC | Age: 81
End: 2021-03-04

## 2021-03-04 ENCOUNTER — OUTSIDE SERVICES (OUTPATIENT)
Dept: INTERNAL MEDICINE | Age: 81
End: 2021-03-04
Payer: MEDICARE

## 2021-03-04 DIAGNOSIS — R23.8 SKIN BREAKDOWN: Primary | ICD-10-CM

## 2021-03-04 ASSESSMENT — ENCOUNTER SYMPTOMS
DIARRHEA: 0
CHEST TIGHTNESS: 0
NAUSEA: 0
VOMITING: 0
BACK PAIN: 0
COUGH: 0

## 2021-03-04 NOTE — PROGRESS NOTES
[facility]      Ivan Neff is a [de-identified] y.o. female resident of [address] [place] [address]  who presents today for medical conditions/complaints as noted below. HPI:     HPI    Patient presents for evaluation and management of wound. Has had a sore area on her buttock for several weeks. Discomfort in the area has been gradually worsening. Denies fever, chills, nausea, vomiting, or diarrhea. Staff has been treating with dressing changes, but has not improved much at this time. Previously treated with calmazine and duoderm, but this caused significant dryness. Wound is complicated by incontinence. Current Outpatient Medications   Medication Sig Dispense Refill    aspirin 81 MG chewable tablet Take 81 mg by mouth daily      Menthol-Zinc Oxide (CALMOSEPTINE EX) topically to bilateral inner buttocks every shift      oxybutynin (DITROPAN-XL) 5 MG extended release tablet Take 5 mg by mouth daily      furosemide (LASIX) 20 MG tablet Take 1 tablet by mouth daily 90 tablet 1    baclofen (LIORESAL) 20 MG tablet Take 20 mg by mouth every 8 hours       folic acid (FOLVITE) 1 MG tablet Take 1 mg by mouth daily      VITAMIN D-3 (COLECALCIFEROL) 400 UNITS TABS Take  by mouth daily.  vitamin B-12 (CYANOCOBALAMIN) 500 MCG tablet Take 500 mcg by mouth daily. No current facility-administered medications for this visit.       No Known Allergies    Health Maintenance   Topic Date Due    COVID-19 Vaccine (1 of 2) Never done    DTaP/Tdap/Td vaccine (1 - Tdap) 01/11/1960    Shingles Vaccine (1 of 2) Never done    Annual Wellness Visit (AWV)  Never done    Flu vaccine (1) 09/01/2020    Potassium monitoring  10/12/2021    Creatinine monitoring  10/12/2021    DEXA (modify frequency per FRAX score)  Completed    Pneumococcal 65+ years Vaccine  Completed    Hepatitis A vaccine  Aged Out    Hepatitis B vaccine  Aged Out    Hib vaccine  Aged Out    Meningococcal (ACWY) vaccine  Aged Out Subjective:      Review of Systems   Constitutional: Negative for chills and fever. Respiratory: Negative for cough and chest tightness. Gastrointestinal: Negative for diarrhea, nausea and vomiting. Musculoskeletal: Positive for gait problem. Negative for back pain. Skin: Positive for wound. Negative for rash. Neurological: Positive for weakness. Stable weakness due to primary lateral sclerosis   Psychiatric/Behavioral: Negative for agitation and behavioral problems. Objective: There were no vitals filed for this visit. Physical Exam  Constitutional:       Appearance: She is not ill-appearing. HENT:      Head: Normocephalic and atraumatic. Eyes:      Extraocular Movements: Extraocular movements intact. Conjunctiva/sclera: Conjunctivae normal.   Pulmonary:      Effort: Pulmonary effort is normal. No respiratory distress. Skin:         Neurological:      General: No focal deficit present. Mental Status: She is alert. Mental status is at baseline. Psychiatric:         Mood and Affect: Mood normal.         Behavior: Behavior normal.         Assessment/Plan:       1. Skin breakdown  - Given conservative dressings and other treatments noted above have been ineffective, will trial Medi honey        Return if symptoms worsen or fail to improve. No orders of the defined types were placed in this encounter. No orders of the defined types were placed in this encounter.                 Electronically signed by RUPERTO Carmona on 3/4/2021 at 8:04 PM

## 2021-03-04 NOTE — TELEPHONE ENCOUNTER
Patient's son contacted the office stating that his mother is currently living at a nursing home. He stated that he needs a letter stating that it is medically necessary for her to be at [facility] in [place].

## 2021-03-05 ENCOUNTER — TELEPHONE (OUTPATIENT)
Dept: INTERNAL MEDICINE | Age: 81
End: 2021-03-05

## 2021-03-05 NOTE — TELEPHONE ENCOUNTER
[id number] Elodia Salcedo for [facility] cover for wound on buttocks to be covered with opitfoam and change daily and PRN. (will likely need changed more frequently due to incontinence problems)    Electronically signed by RUPERTO Goldman on 3/5/2021 at 2:08 PM

## 2021-03-05 NOTE — TELEPHONE ENCOUNTER
Need xclalrification on [employer] sheet cover for wound on buttocks to be covered with opitfoam and change every 3 days and PRN.

## 2021-03-11 ENCOUNTER — TELEPHONE (OUTPATIENT)
Dept: INTERNAL MEDICINE | Age: 81
End: 2021-03-11

## 2021-03-11 NOTE — TELEPHONE ENCOUNTER
Can we verify baclofen orders are per neurology?  Dose is higher than is usually recommended, may be related to primary lateral sclerosis

## 2021-03-11 NOTE — LETTER
[facility] Internal Med A department of [facility], [place], operated by [facility]  Phone: [phone]  Fax: [phone] [place], APRN - CNP        March 15, 2021     Patient: Dennise Gar   YOB: 1940   Date of Visit: 3/11/2021       To Whom It May Concern: It is my medical opinion that Dennise Gar needs to reside in an assisted living facility due to her diagnosis of primary lateral sclerosis. If you have any questions or concerns, please don't hesitate to call.     Sincerely,        HIEU Wolf CNP

## 2021-03-15 ENCOUNTER — HOSPITAL ENCOUNTER (OUTPATIENT)
Age: 81
Setting detail: SPECIMEN
Discharge: HOME | End: 2021-03-15
Payer: MEDICARE

## 2021-03-15 PROCEDURE — 87086 URINE CULTURE/COLONY COUNT: CPT

## 2021-03-15 PROCEDURE — 81001 URINALYSIS AUTO W/SCOPE: CPT

## 2021-03-15 NOTE — TELEPHONE ENCOUNTER
Noted.  [facility] stating necessity of assisted living due to her diagnosis - letter completed, please fax to [facility]

## 2021-03-15 NOTE — TELEPHONE ENCOUNTER
[place] response- \"3/12/21 spoke with POA. He stated its been over a yr or 2 yrs since seen by Neurologist. Is still declining Neurology referral. [facility] [place] stated Baclofen ordered by Mili Valle. \"

## 2021-03-16 ENCOUNTER — TELEPHONE (OUTPATIENT)
Dept: INTERNAL MEDICINE | Age: 81
End: 2021-03-16
Payer: MEDICARE

## 2021-03-16 ENCOUNTER — TELEPHONE (OUTPATIENT)
Dept: INTERNAL MEDICINE | Age: 81
End: 2021-03-16

## 2021-03-16 DIAGNOSIS — I10 ESSENTIAL HYPERTENSION: ICD-10-CM

## 2021-03-16 DIAGNOSIS — E55.9 VITAMIN D DEFICIENCY: ICD-10-CM

## 2021-03-16 DIAGNOSIS — M81.0 AGE-RELATED OSTEOPOROSIS WITHOUT CURRENT PATHOLOGICAL FRACTURE: ICD-10-CM

## 2021-03-16 DIAGNOSIS — R26.81 GAIT INSTABILITY: ICD-10-CM

## 2021-03-16 DIAGNOSIS — G12.23 PRIMARY LATERAL SCLEROSIS (HCC): Primary | ICD-10-CM

## 2021-03-16 LAB
-: ABNORMAL
AMORPHOUS: ABNORMAL
BACTERIA: ABNORMAL
BILIRUBIN URINE: NEGATIVE
CASTS UA: ABNORMAL /LPF (ref 0–2)
COLOR: ABNORMAL
COMMENT UA: ABNORMAL
CRYSTALS, UA: ABNORMAL /HPF
EPITHELIAL CELLS UA: ABNORMAL /HPF (ref 0–5)
GLUCOSE URINE: NEGATIVE
KETONES, URINE: NEGATIVE
LEUKOCYTE ESTERASE, URINE: ABNORMAL
MUCUS: ABNORMAL
NITRITE, URINE: NEGATIVE
OTHER OBSERVATIONS UA: ABNORMAL
PH UA: 7 (ref 5–6)
PROTEIN UA: NEGATIVE
RBC UA: ABNORMAL /HPF (ref 0–4)
RENAL EPITHELIAL, UA: ABNORMAL /HPF
SPECIFIC GRAVITY UA: 1.01 (ref 1.01–1.02)
TRICHOMONAS: ABNORMAL
TURBIDITY: ABNORMAL
URINE HGB: NEGATIVE
UROBILINOGEN, URINE: NORMAL
WBC UA: ABNORMAL /HPF (ref 0–4)
YEAST: ABNORMAL

## 2021-03-16 PROCEDURE — G0180 MD CERTIFICATION HHA PATIENT: HCPCS | Performed by: NURSE PRACTITIONER

## 2021-03-16 RX ORDER — CEFUROXIME AXETIL 250 MG/1
250 TABLET ORAL 2 TIMES DAILY
Qty: 14 TABLET | Refills: 0 | Status: SHIPPED | OUTPATIENT
Start: 2021-03-16 | End: 2021-03-23

## 2021-03-17 LAB
CULTURE: NORMAL
Lab: NORMAL
SPECIMEN DESCRIPTION: NORMAL

## 2021-03-24 ENCOUNTER — TELEPHONE (OUTPATIENT)
Dept: INTERNAL MEDICINE | Age: 81
End: 2021-03-24

## 2021-03-24 NOTE — TELEPHONE ENCOUNTER
Order for acetaminophen 325 mg take 2 tablets Q6HPRN for pain.  Sent to [address] [place] [address]

## 2021-03-25 ENCOUNTER — TELEPHONE (OUTPATIENT)
Dept: INTERNAL MEDICINE | Age: 81
End: 2021-03-25

## 2021-03-25 NOTE — TELEPHONE ENCOUNTER
Per Cari Auguste - Family declines neurology referral.    Baclofen was initially ordered by [facility]

## 2021-03-31 ENCOUNTER — TELEPHONE (OUTPATIENT)
Dept: DIABETES SERVICES | Age: 81
End: 2021-03-31

## 2021-03-31 NOTE — TELEPHONE ENCOUNTER
Received fax from [facility]- \"O/A to Rt buttock per Adelaida Calvo RN CSM. Applied Duoderm drsg today for protection.  May we continue this drsg, change q3days til healed and prn if drng falls off?\"

## 2021-04-05 ENCOUNTER — HOSPITAL ENCOUNTER (OUTPATIENT)
Dept: NON INVASIVE DIAGNOSTICS | Age: 81
Discharge: HOME | End: 2021-04-05
Payer: MEDICARE

## 2021-04-05 DIAGNOSIS — I49.9 IRREGULAR HEART RATE: ICD-10-CM

## 2021-04-05 PROCEDURE — 93226 XTRNL ECG REC<48 HR SCAN A/R: CPT

## 2021-04-05 PROCEDURE — 93225 XTRNL ECG REC<48 HRS REC: CPT

## 2021-04-07 ENCOUNTER — HOSPITAL ENCOUNTER (OUTPATIENT)
Dept: NON INVASIVE DIAGNOSTICS | Age: 81
Discharge: HOME | End: 2021-04-07
Payer: MEDICARE

## 2021-04-08 ENCOUNTER — OUTSIDE SERVICES (OUTPATIENT)
Dept: INTERNAL MEDICINE | Age: 81
End: 2021-04-08
Payer: MEDICARE

## 2021-04-08 ENCOUNTER — TELEPHONE (OUTPATIENT)
Dept: INTERNAL MEDICINE | Age: 81
End: 2021-04-08

## 2021-04-08 DIAGNOSIS — I49.9 IRREGULAR HEART RATE: Primary | ICD-10-CM

## 2021-04-08 ASSESSMENT — ENCOUNTER SYMPTOMS
SHORTNESS OF BREATH: 0
WHEEZING: 0
RHINORRHEA: 0
DIARRHEA: 0
NAUSEA: 0
VOMITING: 0

## 2021-04-08 NOTE — PROGRESS NOTES
orders of the defined types were placed in this encounter.                 Electronically signed by RUPERTO Santana on 4/8/2021 at 3:48 PM

## 2021-04-08 NOTE — TELEPHONE ENCOUNTER
Cardiology referral placed as follow up from visit today, please coordinate with [facility] to set this up, thanks

## 2021-04-08 NOTE — TELEPHONE ENCOUNTER
Pt resides at [facility]. Referral with phone number faxed to [phone].

## 2021-04-12 NOTE — TELEPHONE ENCOUNTER
Son called back- \"I gave you some wrong information. My wife reminded me that the Baclofen was ordered to 'slow down her disease process'. \"

## 2021-04-12 NOTE — TELEPHONE ENCOUNTER
Son returned call- he said Baclofen was ordered ny neurologist years ago (5-10) to help with incontinence.

## 2021-04-12 NOTE — TELEPHONE ENCOUNTER
Left message for Jessica Mckinley (son [phone]) to return call. Also faxed note to [facility].

## 2021-04-12 NOTE — TELEPHONE ENCOUNTER
Can we verify if family can provide additional information regarding her baclofen dose. Was this started by neurology for spasticity associated with her primary lateral sclerosis? This is a very high dose of baclofen, and wanted to verify this is indicated, as she is not currently following with neurology.

## 2021-04-15 ENCOUNTER — OFFICE VISIT (OUTPATIENT)
Dept: CARDIOLOGY | Age: 81
End: 2021-04-15
Payer: MEDICARE

## 2021-04-15 VITALS
SYSTOLIC BLOOD PRESSURE: 124 MMHG | HEIGHT: 65 IN | BODY MASS INDEX: 24.66 KG/M2 | HEART RATE: 73 BPM | WEIGHT: 148 LBS | DIASTOLIC BLOOD PRESSURE: 62 MMHG

## 2021-04-15 DIAGNOSIS — I10 ESSENTIAL HYPERTENSION: Primary | ICD-10-CM

## 2021-04-15 PROCEDURE — 99214 OFFICE O/P EST MOD 30 MIN: CPT | Performed by: INTERNAL MEDICINE

## 2021-04-15 PROCEDURE — 93005 ELECTROCARDIOGRAM TRACING: CPT | Performed by: INTERNAL MEDICINE

## 2021-04-15 PROCEDURE — 99204 OFFICE O/P NEW MOD 45 MIN: CPT | Performed by: INTERNAL MEDICINE

## 2021-04-15 PROCEDURE — 93010 ELECTROCARDIOGRAM REPORT: CPT | Performed by: INTERNAL MEDICINE

## 2021-04-15 RX ORDER — METOPROLOL SUCCINATE 25 MG/1
12.5 TABLET, EXTENDED RELEASE ORAL DAILY
Qty: 30 TABLET | Refills: 3 | Status: SHIPPED | OUTPATIENT
Start: 2021-04-15

## 2021-04-15 NOTE — PROGRESS NOTES
AS HPI  · Gastrointestinal: No abdominal pain, appetite loss, blood in stools. No change in bowel or bladder habits. · Genitourinary: No dysuria, trouble voiding, or hematuria. · Musculoskeletal:  No gait disturbance, No weakness or joint complaints. · Integumentary: No rash or pruritis. · Neurological: Positive for PLS in the left lower extremity  · Psychiatric: No new anxiety or depression. · Endocrine: No temperature intolerance. No excessive thirst, fluid intake, or urination. No tremor. · Hematologic/Lymphatic: No abnormal bruising or bleeding, blood clots or swollen lymph nodes. · Allergic/Immunologic: No nasal congestion or hives. Medications:  Current Outpatient Medications   Medication Sig Dispense Refill    Control Gel Formula Dressing (DUODERM CGF DRESSING EX) To right buttock (protection), change q3d and prn      Acetaminophen 325 MG CAPS Take 650 mg by mouth every 6 hours as needed (pain) 120 capsule 0    vitamin D 25 MCG (1000 UT) CAPS Take 1,000 Units by mouth daily      aspirin 81 MG chewable tablet Take 81 mg by mouth daily      Menthol-Zinc Oxide (CALMOSEPTINE EX) topically to bilateral inner buttocks every shift      oxybutynin (DITROPAN-XL) 5 MG extended release tablet Take 5 mg by mouth daily      furosemide (LASIX) 20 MG tablet Take 1 tablet by mouth daily 90 tablet 1    baclofen (LIORESAL) 20 MG tablet Take 20 mg by mouth every 8 hours       folic acid (FOLVITE) 1 MG tablet Take 1 mg by mouth daily      vitamin B-12 (CYANOCOBALAMIN) 500 MCG tablet Take 500 mcg by mouth daily. No current facility-administered medications for this visit. Physical Exam:   Vitals: /62   Pulse 73   Ht 5' 5\" (1.651 m)   Wt 148 lb (67.1 kg)   BMI 24.63 kg/m²   General appearance: alert, oriented and cooperative with exam  HEENT: Head: Normocephalic, no lesions, without obvious abnormality.   Neck: no carotid bruit, no JVD  Lungs: clear to auscultation bilaterally without any wheezing or rales   Heart: regular rate and rhythm, S1, S2 normal, no murmur, click, rub or gallop  Abdomen: soft, non-tender; bowel sounds normal; no masses,  no organomegaly  Extremities: 1+ pitting edema in the left lower extremity, no edema on the right  Neurologic: Mental status: Alert, oriented, thought content appropriate    Labs:  CBC: No results for input(s): WBC, HGB, HCT, PLT in the last 72 hours. BMP: No results for input(s): NA, K, CO2, BUN, CREATININE, LABGLOM, GLUCOSE in the last 72 hours. PT/INR: No results for input(s): PROTIME, INR in the last 72 hours. FASTING LIPID PANEL:  Lab Results   Component Value Date    HDL 25 10/12/2020    LDLCALC 87.8 10/12/2020    TRIG 131 10/12/2020       EKG 4/15/2021:  Normal sinus rhythm, low voltage QRS, nonspecific T wave abnormality      Past Medical and Surgical History, Problem List, Allergies, Medications, Labs, Imaging, all reviewed extensively in EMR and with the patient. Assessment:   1. Frequent PACs with short run of PAT  2. History of primary lateral sclerosis  3. History of hypertension    Plan:    Start Toprol-XL 12.5 mg daily   Decrease in caffeine intake   Counseled to stay well-hydrated with electrolyte rich fluids   Repeat evaluation in 4 to 5 months   In case patient is symptomatic, will consider further cardiac testing with echocardiogram    Patient's medications and side effects were discussed. All questions and concerns were addressed to patient's satisfaction. Thank you for allowing me to participate in the care of this patient, please do not hesitate to call if you have any questions. Ilene Jurado MD, [address] [facility]  [facility]. [facility]  [phone]

## 2021-04-20 ENCOUNTER — TELEPHONE (OUTPATIENT)
Dept: INTERNAL MEDICINE | Age: 81
End: 2021-04-20
Payer: MEDICARE

## 2021-04-20 DIAGNOSIS — G12.23 PRIMARY LATERAL SCLEROSIS (HCC): Primary | ICD-10-CM

## 2021-04-20 DIAGNOSIS — E55.9 VITAMIN D DEFICIENCY: ICD-10-CM

## 2021-04-20 DIAGNOSIS — M81.0 AGE-RELATED OSTEOPOROSIS WITHOUT CURRENT PATHOLOGICAL FRACTURE: ICD-10-CM

## 2021-04-20 DIAGNOSIS — I10 ESSENTIAL HYPERTENSION: ICD-10-CM

## 2021-04-20 PROCEDURE — G0180 MD CERTIFICATION HHA PATIENT: HCPCS | Performed by: NURSE PRACTITIONER

## 2021-06-03 ENCOUNTER — OUTSIDE SERVICES (OUTPATIENT)
Dept: INTERNAL MEDICINE | Age: 81
End: 2021-06-03
Payer: MEDICARE

## 2021-06-03 VITALS
WEIGHT: 161 LBS | HEART RATE: 73 BPM | OXYGEN SATURATION: 98 % | DIASTOLIC BLOOD PRESSURE: 68 MMHG | RESPIRATION RATE: 20 BRPM | SYSTOLIC BLOOD PRESSURE: 102 MMHG | TEMPERATURE: 98.7 F | HEIGHT: 65 IN | BODY MASS INDEX: 26.82 KG/M2

## 2021-06-03 DIAGNOSIS — M81.0 AGE-RELATED OSTEOPOROSIS WITHOUT CURRENT PATHOLOGICAL FRACTURE: ICD-10-CM

## 2021-06-03 DIAGNOSIS — G12.23 PRIMARY LATERAL SCLEROSIS (HCC): ICD-10-CM

## 2021-06-03 DIAGNOSIS — I10 ESSENTIAL HYPERTENSION: Primary | ICD-10-CM

## 2021-06-03 DIAGNOSIS — I49.9 IRREGULAR HEART RATE: ICD-10-CM

## 2021-06-03 DIAGNOSIS — L89.310 PRESSURE SORE ON BUTTOCKS, RIGHT, UNSTAGEABLE (HCC): ICD-10-CM

## 2021-06-03 DIAGNOSIS — R26.81 GAIT INSTABILITY: ICD-10-CM

## 2021-06-03 DIAGNOSIS — E55.9 VITAMIN D DEFICIENCY: ICD-10-CM

## 2021-06-03 DIAGNOSIS — I49.1 PREMATURE ATRIAL CONTRACTIONS: ICD-10-CM

## 2021-06-03 DIAGNOSIS — R32 URINARY INCONTINENCE, UNSPECIFIED TYPE: ICD-10-CM

## 2021-06-03 ASSESSMENT — ENCOUNTER SYMPTOMS
CHEST TIGHTNESS: 0
NAUSEA: 0
SORE THROAT: 0
SHORTNESS OF BREATH: 0
DIARRHEA: 0
VOMITING: 0

## 2021-06-03 NOTE — PROGRESS NOTES
[facility]      Deniz Tucker is a [de-identified] y.o. female resident of [address] [place] [address]  who presents today for medical conditions/complaints as noted below. HPI:     HPI  Patient presents for evaluation and management of chronic medical conditions as noted below. Hypertension is well controlled on metoprolol and lasix. Denies chest pain or dyspnea. Recently established with cardiology after she was noted to have irregular heartbeat, and subsequent Holter revealed moderately frequent PACs with a short run of PAT. She has since been started on metoprolol. She remains asymptomatic. No bradycardia noted on metoprolol. Primarily lateral sclerosis has been stable. Continues to have gait instability. Continues on baclofen. No recent follow up with neurology    Continues on vitamin D supplement for deficiency. Continues on oxybutynin for urinary incontinence, which is stable    Wound on buttocks is improving, but not yet healed. She recently has started using additional cushions in addition to dressing changes by staff.  We discussed referral to wound clinic if her wound worsens or fails to resolve, declines referral at this time    Current Outpatient Medications   Medication Sig Dispense Refill    metoprolol succinate (TOPROL XL) 25 MG extended release tablet Take 0.5 tablets by mouth daily 30 tablet 3    Control Gel Formula Dressing (DUODERM CGF DRESSING EX) To right buttock (protection), change q3d and prn      Acetaminophen 325 MG CAPS Take 650 mg by mouth every 6 hours as needed (pain) 120 capsule 0    vitamin D 25 MCG (1000 UT) CAPS Take 1,000 Units by mouth daily      aspirin 81 MG chewable tablet Take 81 mg by mouth daily      Menthol-Zinc Oxide (CALMOSEPTINE EX) topically to bilateral inner buttocks every shift      oxybutynin (DITROPAN-XL) 5 MG extended release tablet Take 5 mg by mouth daily      furosemide (LASIX) 20 MG tablet Take 1 tablet by mouth daily 90 tablet 1  baclofen (LIORESAL) 20 MG tablet Take 20 mg by mouth every 8 hours       folic acid (FOLVITE) 1 MG tablet Take 1 mg by mouth daily      vitamin B-12 (CYANOCOBALAMIN) 500 MCG tablet Take 500 mcg by mouth daily. No current facility-administered medications for this visit. No Known Allergies    Health Maintenance   Topic Date Due    COVID-19 Vaccine (1) Never done    DTaP/Tdap/Td vaccine (1 - Tdap) 01/11/1960    Shingles Vaccine (1 of 2) Never done    Annual Wellness Visit (AWV)  Never done    Flu vaccine (Season Ended) 09/01/2021    Potassium monitoring  10/12/2021    Creatinine monitoring  10/12/2021    DEXA (modify frequency per FRAX score)  Completed    Pneumococcal 65+ years Vaccine  Completed    Hepatitis A vaccine  Aged Out    Hepatitis B vaccine  Aged Out    Hib vaccine  Aged Out    Meningococcal (ACWY) vaccine  Aged Out       Subjective:      Review of Systems   Constitutional: Negative for chills and fever. HENT: Negative for congestion and sore throat. Respiratory: Negative for chest tightness and shortness of breath. Cardiovascular: Negative for chest pain and leg swelling. Gastrointestinal: Negative for diarrhea, nausea and vomiting. Genitourinary: Negative for difficulty urinating and dysuria. Musculoskeletal: Positive for gait problem. Negative for myalgias. Neurological: Positive for weakness. Negative for dizziness and headaches. Psychiatric/Behavioral: Negative for confusion and decreased concentration. Due to patient's clinical status, ROS of symptoms was completed by discussion with long term care facility staff, as well as with input from patient. Objective:     Vitals:    06/03/21 1324   BP: 102/68   Pulse: 73   Resp: 20   Temp: 98.7 °F (37.1 °C)   SpO2: 98%   Weight: 161 lb (73 kg)   Height: 5' 5\" (1.651 m)     Physical Exam  Vitals and nursing note reviewed. Constitutional:       General: She is not in acute distress.      Appearance: She Continue dressing changes. Discussed referral to wound clinic if this does not continue to improve. Return if symptoms worsen or fail to improve. No orders of the defined types were placed in this encounter. No orders of the defined types were placed in this encounter.                 Electronically signed by HIEU Escalante CNP, NP-C on 6/3/2021 at 2:06 PM

## 2021-06-09 ENCOUNTER — TELEPHONE (OUTPATIENT)
Dept: INTERNAL MEDICINE | Age: 81
End: 2021-06-09
Payer: MEDICARE

## 2021-06-09 DIAGNOSIS — M81.0 AGE-RELATED OSTEOPOROSIS WITHOUT CURRENT PATHOLOGICAL FRACTURE: ICD-10-CM

## 2021-06-09 DIAGNOSIS — I10 ESSENTIAL HYPERTENSION: ICD-10-CM

## 2021-06-09 DIAGNOSIS — R32 URINARY INCONTINENCE, UNSPECIFIED TYPE: ICD-10-CM

## 2021-06-09 DIAGNOSIS — E55.9 VITAMIN D DEFICIENCY: ICD-10-CM

## 2021-06-09 DIAGNOSIS — G12.23 PRIMARY LATERAL SCLEROSIS (HCC): Primary | ICD-10-CM

## 2021-06-09 PROCEDURE — G0179 MD RECERTIFICATION HHA PT: HCPCS | Performed by: NURSE PRACTITIONER

## 2021-08-05 ENCOUNTER — OUTSIDE SERVICES (OUTPATIENT)
Dept: INTERNAL MEDICINE | Age: 81
End: 2021-08-05
Payer: MEDICARE

## 2021-08-05 ENCOUNTER — TELEPHONE (OUTPATIENT)
Dept: INTERNAL MEDICINE | Age: 81
End: 2021-08-05

## 2021-08-05 VITALS
SYSTOLIC BLOOD PRESSURE: 144 MMHG | DIASTOLIC BLOOD PRESSURE: 87 MMHG | HEART RATE: 53 BPM | RESPIRATION RATE: 20 BRPM | TEMPERATURE: 98.5 F

## 2021-08-05 DIAGNOSIS — R26.9 GAIT ABNORMALITY: ICD-10-CM

## 2021-08-05 DIAGNOSIS — R53.1 GENERALIZED WEAKNESS: Primary | ICD-10-CM

## 2021-08-05 DIAGNOSIS — R29.898 WEAKNESS OF BOTH LOWER EXTREMITIES: ICD-10-CM

## 2021-08-05 ASSESSMENT — ENCOUNTER SYMPTOMS
VOMITING: 0
WHEEZING: 0
NAUSEA: 0
DIARRHEA: 0
COUGH: 0
RHINORRHEA: 0

## 2021-08-05 NOTE — PROGRESS NOTES
[facility]      Jennifer Maurer is a [de-identified] y.o. female resident of [address] [place] [address]  who presents today for medical conditions/complaints as noted below. HPI:     HPI     Patient presents patient presents for face-to-face for physical therapy. Both patient and staff had noticed some increase in bilateral lower extremity weakness. She does have a known history of primary lateral sclerosis. She has not had any recent falls, but does require additional assistance with staff for ADLs. Current Outpatient Medications   Medication Sig Dispense Refill    metoprolol succinate (TOPROL XL) 25 MG extended release tablet Take 0.5 tablets by mouth daily 30 tablet 3    Control Gel Formula Dressing (DUODERM CGF DRESSING EX) To right buttock (protection), change q3d and prn      Acetaminophen 325 MG CAPS Take 650 mg by mouth every 6 hours as needed (pain) 120 capsule 0    vitamin D 25 MCG (1000 UT) CAPS Take 1,000 Units by mouth daily      aspirin 81 MG chewable tablet Take 81 mg by mouth daily      oxybutynin (DITROPAN-XL) 5 MG extended release tablet Take 5 mg by mouth daily      furosemide (LASIX) 20 MG tablet Take 1 tablet by mouth daily 90 tablet 1    baclofen (LIORESAL) 20 MG tablet Take 20 mg by mouth every 8 hours       folic acid (FOLVITE) 1 MG tablet Take 1 mg by mouth daily      vitamin B-12 (CYANOCOBALAMIN) 500 MCG tablet Take 500 mcg by mouth daily. No current facility-administered medications for this visit.      No Known Allergies    Health Maintenance   Topic Date Due    COVID-19 Vaccine (1) Never done    Shingles Vaccine (1 of 2) Never done    DTaP/Tdap/Td vaccine (1 - Tdap) 07/17/2009    Annual Wellness Visit (AWV)  Never done    Flu vaccine (1) 09/01/2021    Potassium monitoring  10/12/2021    Creatinine monitoring  10/12/2021    DEXA (modify frequency per FRAX score)  Completed    Pneumococcal 65+ years Vaccine  Completed    Hepatitis A vaccine  Aged Out  Hepatitis B vaccine  Aged Out    Hib vaccine  Aged Out    Meningococcal (ACWY) vaccine  Aged Out       Subjective:      Review of Systems   Constitutional: Negative for chills and fever. HENT: Negative for congestion and rhinorrhea. Respiratory: Negative for cough and wheezing. Cardiovascular: Negative for chest pain and leg swelling. Gastrointestinal: Negative for diarrhea, nausea and vomiting. Musculoskeletal: Positive for gait problem. Neurological: Positive for weakness. Negative for dizziness. Due to patient's clinical status, ROS of symptoms was completed by discussion with long term care facility staff, as well as with input from patient. Objective:     Vitals:    08/05/21 1104   BP: (!) 144/87   Pulse: 53   Resp: 20   Temp: 98.5 °F (36.9 °C)     Physical Exam  Constitutional:       General: She is not in acute distress. HENT:      Head: Normocephalic and atraumatic. Right Ear: External ear normal.      Left Ear: External ear normal.   Eyes:      Extraocular Movements: Extraocular movements intact. Conjunctiva/sclera: Conjunctivae normal.   Cardiovascular:      Rate and Rhythm: Normal rate and regular rhythm. Pulmonary:      Effort: Pulmonary effort is normal. No respiratory distress. Skin:     General: Skin is warm and dry. Neurological:      Mental Status: She is alert. Psychiatric:         Mood and Affect: Mood normal.         Behavior: Behavior normal.         Assessment/Plan:        1. Generalized weakness  2. Weakness of both lower extremities  3. Gait abnormality  - PT evaluate and treat        Return if symptoms worsen or fail to improve. No orders of the defined types were placed in this encounter. No orders of the defined types were placed in this encounter.                 Electronically signed by HIEU Valladares CNP on 8/5/2021 at 4:05 PM

## 2021-08-23 ENCOUNTER — TELEPHONE (OUTPATIENT)
Dept: INTERNAL MEDICINE | Age: 81
End: 2021-08-23

## 2021-08-23 NOTE — TELEPHONE ENCOUNTER
Tereso Pitt with [facility] called. They need a diagnosis added for caring for the wound on her bottom.   She can be reached at [phone]  Ext [phone]

## 2021-08-24 ENCOUNTER — TELEPHONE (OUTPATIENT)
Dept: INTERNAL MEDICINE | Age: 81
End: 2021-08-24
Payer: MEDICARE

## 2021-08-24 DIAGNOSIS — E55.9 VITAMIN D DEFICIENCY: ICD-10-CM

## 2021-08-24 DIAGNOSIS — G12.23 PRIMARY LATERAL SCLEROSIS (HCC): Primary | ICD-10-CM

## 2021-08-24 DIAGNOSIS — Z91.81 HISTORY OF FALLING: ICD-10-CM

## 2021-08-24 DIAGNOSIS — R32 URINARY INCONTINENCE, UNSPECIFIED TYPE: ICD-10-CM

## 2021-08-24 DIAGNOSIS — Z79.82 LONG-TERM USE OF ASPIRIN THERAPY: ICD-10-CM

## 2021-08-24 DIAGNOSIS — I10 ESSENTIAL HYPERTENSION: ICD-10-CM

## 2021-08-24 PROCEDURE — G0179 MD RECERTIFICATION HHA PT: HCPCS | Performed by: NURSE PRACTITIONER

## 2021-08-31 ENCOUNTER — TELEPHONE (OUTPATIENT)
Dept: INTERNAL MEDICINE | Age: 81
End: 2021-08-31
Payer: MEDICARE

## 2021-08-31 DIAGNOSIS — L89.310 PRESSURE SORE ON BUTTOCKS, RIGHT, UNSTAGEABLE (HCC): ICD-10-CM

## 2021-08-31 DIAGNOSIS — G12.23 PRIMARY LATERAL SCLEROSIS (HCC): Primary | ICD-10-CM

## 2021-08-31 DIAGNOSIS — I10 ESSENTIAL HYPERTENSION: ICD-10-CM

## 2021-08-31 DIAGNOSIS — I49.1 PREMATURE ATRIAL CONTRACTIONS: ICD-10-CM

## 2021-08-31 DIAGNOSIS — Z79.82 LONG-TERM USE OF ASPIRIN THERAPY: ICD-10-CM

## 2021-08-31 DIAGNOSIS — M81.0 AGE-RELATED OSTEOPOROSIS WITHOUT CURRENT PATHOLOGICAL FRACTURE: ICD-10-CM

## 2021-08-31 PROCEDURE — G0180 MD CERTIFICATION HHA PATIENT: HCPCS | Performed by: NURSE PRACTITIONER

## 2021-09-01 ENCOUNTER — TELEPHONE (OUTPATIENT)
Dept: INTERNAL MEDICINE | Age: 81
End: 2021-09-01

## 2021-09-01 NOTE — TELEPHONE ENCOUNTER
JAMIEI- received call from [phone])- pt is being discharged from skilled services, but will still be receiving Aid assist twice a week for bathing.

## 2021-09-09 ENCOUNTER — OUTSIDE SERVICES (OUTPATIENT)
Dept: INTERNAL MEDICINE | Age: 81
End: 2021-09-09
Payer: MEDICARE

## 2021-09-09 VITALS
SYSTOLIC BLOOD PRESSURE: 124 MMHG | RESPIRATION RATE: 18 BRPM | DIASTOLIC BLOOD PRESSURE: 77 MMHG | TEMPERATURE: 98.3 F | HEART RATE: 69 BPM

## 2021-09-09 DIAGNOSIS — E55.9 VITAMIN D DEFICIENCY: ICD-10-CM

## 2021-09-09 DIAGNOSIS — R32 URINARY INCONTINENCE, UNSPECIFIED TYPE: ICD-10-CM

## 2021-09-09 DIAGNOSIS — R29.898 WEAKNESS OF BOTH LOWER EXTREMITIES: ICD-10-CM

## 2021-09-09 DIAGNOSIS — R23.8 SKIN BREAKDOWN: ICD-10-CM

## 2021-09-09 DIAGNOSIS — I10 ESSENTIAL HYPERTENSION: Primary | ICD-10-CM

## 2021-09-09 DIAGNOSIS — L89.310 PRESSURE SORE ON BUTTOCKS, RIGHT, UNSTAGEABLE (HCC): ICD-10-CM

## 2021-09-09 DIAGNOSIS — G12.23 PRIMARY LATERAL SCLEROSIS (HCC): ICD-10-CM

## 2021-09-09 DIAGNOSIS — R53.1 GENERALIZED WEAKNESS: ICD-10-CM

## 2021-09-09 DIAGNOSIS — I49.1 PREMATURE ATRIAL CONTRACTIONS: ICD-10-CM

## 2021-09-09 NOTE — PROGRESS NOTES
[facility]      Talha Shah is a [de-identified] y.o. female resident of [address] [place] [address]  who presents today for medical conditions/complaints as noted below. HPI:     HPI  Patient presents for evaluation and management of chronic medical conditions as noted below. Continues on metoprolol and lasix for hypertension which is well controlled. Denies chest pain or dyspnea. Follows with cardiology for PAT. Denies palpitations. Primarily lateral sclerosis has been stable, no recent follow up with neurology. Has been working with PT for weakness, has had minimal improvement. Continues on baclofen. Vit D deficiency stable on supplement. Urinary incontinence stable, continues on oxybutynin. Pressure sore on buttocks is intermittently problematic. Staff notes it will be nearly healed, and then recur again, but overall has been gradually improving. Discussed continuing wound care orders on PRN basis. Current Outpatient Medications   Medication Sig Dispense Refill    metoprolol succinate (TOPROL XL) 25 MG extended release tablet Take 0.5 tablets by mouth daily 30 tablet 3    Control Gel Formula Dressing (DUODERM CGF DRESSING EX) To right buttock (protection), change q3d and prn      Acetaminophen 325 MG CAPS Take 650 mg by mouth every 6 hours as needed (pain) 120 capsule 0    vitamin D 25 MCG (1000 UT) CAPS Take 1,000 Units by mouth daily      aspirin 81 MG chewable tablet Take 81 mg by mouth daily      oxybutynin (DITROPAN-XL) 5 MG extended release tablet Take 5 mg by mouth daily      furosemide (LASIX) 20 MG tablet Take 1 tablet by mouth daily 90 tablet 1    baclofen (LIORESAL) 20 MG tablet Take 20 mg by mouth every 8 hours       folic acid (FOLVITE) 1 MG tablet Take 1 mg by mouth daily      vitamin B-12 (CYANOCOBALAMIN) 500 MCG tablet Take 500 mcg by mouth daily. No current facility-administered medications for this visit.      No Known Allergies    Health Maintenance   Topic Date Due    COVID-19 Vaccine (1) Never done    Shingles Vaccine (1 of 2) Never done    DTaP/Tdap/Td vaccine (1 - Tdap) 07/17/2009    Annual Wellness Visit (AWV)  Never done    Flu vaccine (1) 09/01/2021    Potassium monitoring  10/12/2021    Creatinine monitoring  10/12/2021    DEXA (modify frequency per FRAX score)  Completed    Pneumococcal 65+ years Vaccine  Completed    Hepatitis A vaccine  Aged Out    Hepatitis B vaccine  Aged Out    Hib vaccine  Aged Out    Meningococcal (ACWY) vaccine  Aged Out       Subjective:      Review of Systems   Constitutional: Negative for chills and fever. HENT: Negative for congestion and sore throat. Respiratory: Negative for chest tightness and shortness of breath. Cardiovascular: Negative for chest pain and leg swelling. Gastrointestinal: Negative for diarrhea, nausea and vomiting. Genitourinary: Negative for difficulty urinating and dysuria. Musculoskeletal: Positive for gait problem. Negative for myalgias. Neurological: Positive for weakness. Negative for dizziness and headaches. Psychiatric/Behavioral: Negative for confusion and decreased concentration. Due to patient's clinical status, ROS of symptoms was completed by discussion with long term care facility staff, as well as with input from patient. Objective:     Vitals:    09/09/21 1129   BP: 124/77   Pulse: 69   Resp: 18   Temp: 98.3 °F (36.8 °C)     Physical Exam  Vitals and nursing note reviewed. Constitutional:       General: She is not in acute distress. Appearance: She is well-developed. HENT:      Head: Normocephalic and atraumatic. Right Ear: External ear normal.      Left Ear: External ear normal.   Eyes:      General: Lids are normal.      Extraocular Movements: Extraocular movements intact. Conjunctiva/sclera: Conjunctivae normal.   Neck:      Thyroid: No thyromegaly.    Cardiovascular:      Rate and Rhythm: Normal rate and regular rhythm. Heart sounds: No murmur heard. Comments: Occasional skipped beat, but primarily regular rhythm  Pulmonary:      Effort: Pulmonary effort is normal. No accessory muscle usage or respiratory distress. Breath sounds: Normal breath sounds. No wheezing, rhonchi or rales. Abdominal:      Palpations: Abdomen is soft. Tenderness: There is no abdominal tenderness. There is no guarding or rebound. Musculoskeletal:         General: Normal range of motion. Cervical back: Neck supple. Right lower leg: No edema. Left lower leg: No edema. Lymphadenopathy:      Cervical: No cervical adenopathy. Skin:     General: Skin is warm and dry. Nails: There is no clubbing. Neurological:      Mental Status: She is alert. Coordination: Coordination normal.   Psychiatric:         Mood and Affect: Mood normal.         Speech: Speech normal.         Behavior: Behavior normal.         Assessment/Plan:        1. Essential hypertension,  stable  - Continue to monitor    2. Premature atrial contractions, stable  - Continue to follow with cardiology    3. Primary lateral sclerosis ([address] stable  4. Weakness of both lower extremities, stable  5. Generalized weakness, stable  - Will continue PT at this time and monitor for continued improvement. Will consider neurology follow up if any status change    6. Vitamin D deficiency, stable  - Continue supplement    7. Urinary incontinence, unspecified type,  stable  - Continue to monitor    8. Pressure sore on buttocks, right, unstageable ([address] improving  9. Skin breakdown, improving  - Continue wound care PRN        Return in about 3 months (around 12/9/2021). No orders of the defined types were placed in this encounter. No orders of the defined types were placed in this encounter.                 Electronically signed by HIEU Steen CNP on 9/10/2021 at 10:31 AM

## 2021-09-10 ASSESSMENT — ENCOUNTER SYMPTOMS
NAUSEA: 0
SHORTNESS OF BREATH: 0
CHEST TIGHTNESS: 0
VOMITING: 0
SORE THROAT: 0
DIARRHEA: 0

## 2021-09-16 ENCOUNTER — OFFICE VISIT (OUTPATIENT)
Dept: CARDIOLOGY | Age: 81
End: 2021-09-16
Payer: MEDICARE

## 2021-09-16 VITALS — DIASTOLIC BLOOD PRESSURE: 70 MMHG | SYSTOLIC BLOOD PRESSURE: 137 MMHG | HEART RATE: 70 BPM

## 2021-09-16 DIAGNOSIS — I10 ESSENTIAL HYPERTENSION: Primary | ICD-10-CM

## 2021-09-16 PROCEDURE — 93010 ELECTROCARDIOGRAM REPORT: CPT | Performed by: INTERNAL MEDICINE

## 2021-09-16 PROCEDURE — 93005 ELECTROCARDIOGRAM TRACING: CPT | Performed by: INTERNAL MEDICINE

## 2021-09-16 PROCEDURE — 99214 OFFICE O/P EST MOD 30 MIN: CPT | Performed by: INTERNAL MEDICINE

## 2021-09-16 NOTE — PROGRESS NOTES
Cardiology Consultation/Follow Up. [facility]    CC: Patient is here for follow up for irregular heartbeat    HPI  Migdalia Carrillo is here for follow up visit. Started on Toprol XL last visit. Patient denies any cardiac symptoms including chest pain, dyspnea, orthopnea, lower extremity edema or palpitations. No syncope or presyncope. 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 Past Medical:  Past Medical History:   Diagnosis Date    Hypertension     Osteoarthritis     Osteoporosis     Primary lateral sclerosis ([address]        Past Surgical:  Past Surgical History:   Procedure Laterality Date    APPENDECTOMY  11/1977    CATARACT REMOVAL Bilateral 2018    HYSTERECTOMY, TOTAL ABDOMINAL  1977    ovaries remain    TONSILLECTOMY AND ADENOIDECTOMY  1946       Family History:  Family History   Problem Relation Age of Onset   [facility] Migraines Mother     Heart Disease Mother         valvular    Migraines Father     Coronary Art Dis Father     Other Father         COPD    Cancer Brother         throat    Breast Cancer Maternal Aunt        Social History:  Social History     Tobacco Use    Smoking status: Never Smoker    Smokeless tobacco: Never Used   Vaping Use    Vaping Use: Never used   Substance Use Topics    Alcohol use: No     Alcohol/week: 0.0 standard drinks    Drug use: No        REVIEW OF SYSTEMS:    · Constitutional: there has been no unanticipated weight loss. There's been No change in energy level, No change in activity level. · Eyes: No visual changes or diplopia. No scleral icterus. · ENT: No Headaches, hearing loss or vertigo. No mouth sores or sore throat. · Cardiovascular: As described in HPI. · Respiratory: AS HPI  · Gastrointestinal: No abdominal pain, appetite loss, blood in stools. No change in bowel or bladder habits. · Genitourinary: No dysuria, trouble voiding, or hematuria.   · Musculoskeletal:  No gait disturbance, No weakness or joint complaints. · Integumentary: No rash or pruritis. · Neurological: Positive for PLS in the left lower extremity  · Psychiatric: No new anxiety or depression. · Endocrine: No temperature intolerance. No excessive thirst, fluid intake, or urination. No tremor. · Hematologic/Lymphatic: No abnormal bruising or bleeding, blood clots or swollen lymph nodes. · Allergic/Immunologic: No nasal congestion or hives. Medications:  Current Outpatient Medications   Medication Sig Dispense Refill    metoprolol succinate (TOPROL XL) 25 MG extended release tablet Take 0.5 tablets by mouth daily 30 tablet 3    Control Gel Formula Dressing (DUODERM CGF DRESSING EX) To right buttock (protection), change q3d and prn      Acetaminophen 325 MG CAPS Take 650 mg by mouth every 6 hours as needed (pain) 120 capsule 0    vitamin D 25 MCG (1000 UT) CAPS Take 1,000 Units by mouth daily      aspirin 81 MG chewable tablet Take 81 mg by mouth daily      oxybutynin (DITROPAN-XL) 5 MG extended release tablet Take 5 mg by mouth daily      furosemide (LASIX) 20 MG tablet Take 1 tablet by mouth daily 90 tablet 1    baclofen (LIORESAL) 20 MG tablet Take 20 mg by mouth every 8 hours       folic acid (FOLVITE) 1 MG tablet Take 1 mg by mouth daily      vitamin B-12 (CYANOCOBALAMIN) 500 MCG tablet Take 500 mcg by mouth daily. No current facility-administered medications for this visit. Physical Exam:   Vitals: /70   Pulse 70      General appearance: alert, oriented and cooperative with exam  HEENT: Head: Normocephalic, no lesions, without obvious abnormality.   Neck: no carotid bruit, no JVD  Lungs: clear to auscultation bilaterally without any wheezing or rales   Heart: regular rate and rhythm, S1, S2 normal, no murmur, click, rub or gallop  Abdomen: soft, non-tender; bowel sounds normal; no masses,  no organomegaly  Extremities: 1+ pitting edema in the left lower extremity, no edema on the right  Neurologic: Mental

## 2021-09-21 ENCOUNTER — TELEPHONE (OUTPATIENT)
Dept: INTERNAL MEDICINE | Age: 81
End: 2021-09-21
Payer: MEDICARE

## 2021-09-21 DIAGNOSIS — E55.9 VITAMIN D DEFICIENCY: ICD-10-CM

## 2021-09-21 DIAGNOSIS — I10 ESSENTIAL HYPERTENSION: ICD-10-CM

## 2021-09-21 DIAGNOSIS — L89.310 PRESSURE SORE ON BUTTOCKS, RIGHT, UNSTAGEABLE (HCC): ICD-10-CM

## 2021-09-21 DIAGNOSIS — G12.23 PRIMARY LATERAL SCLEROSIS (HCC): Primary | ICD-10-CM

## 2021-09-21 DIAGNOSIS — Z79.82 LONG-TERM USE OF ASPIRIN THERAPY: ICD-10-CM

## 2021-09-21 PROCEDURE — G0180 MD CERTIFICATION HHA PATIENT: HCPCS | Performed by: NURSE PRACTITIONER

## 2021-11-03 ENCOUNTER — TELEPHONE (OUTPATIENT)
Dept: INTERNAL MEDICINE | Age: 81
End: 2021-11-03
Payer: MEDICARE

## 2021-11-03 DIAGNOSIS — Z91.81 HISTORY OF FALLING: ICD-10-CM

## 2021-11-03 DIAGNOSIS — L89.310 PRESSURE SORE ON BUTTOCKS, RIGHT, UNSTAGEABLE (HCC): ICD-10-CM

## 2021-11-03 DIAGNOSIS — R29.898 WEAKNESS OF BOTH LOWER EXTREMITIES: ICD-10-CM

## 2021-11-03 DIAGNOSIS — G12.23 PRIMARY LATERAL SCLEROSIS (HCC): Primary | ICD-10-CM

## 2021-11-03 DIAGNOSIS — I10 ESSENTIAL HYPERTENSION: ICD-10-CM

## 2021-11-03 DIAGNOSIS — E55.9 VITAMIN D DEFICIENCY: ICD-10-CM

## 2021-11-03 DIAGNOSIS — Z79.82 LONG-TERM USE OF ASPIRIN THERAPY: ICD-10-CM

## 2021-11-03 PROCEDURE — G0179 MD RECERTIFICATION HHA PT: HCPCS | Performed by: NURSE PRACTITIONER

## 2021-12-16 ENCOUNTER — TELEPHONE (OUTPATIENT)
Dept: INTERNAL MEDICINE | Age: 81
End: 2021-12-16

## 2021-12-16 ENCOUNTER — OUTSIDE SERVICES (OUTPATIENT)
Dept: INTERNAL MEDICINE | Age: 81
End: 2021-12-16
Payer: MEDICARE

## 2021-12-16 VITALS
TEMPERATURE: 97.3 F | RESPIRATION RATE: 18 BRPM | HEART RATE: 60 BPM | BODY MASS INDEX: 26.96 KG/M2 | WEIGHT: 162 LBS | OXYGEN SATURATION: 98 % | SYSTOLIC BLOOD PRESSURE: 136 MMHG | DIASTOLIC BLOOD PRESSURE: 84 MMHG

## 2021-12-16 DIAGNOSIS — R26.81 GAIT INSTABILITY: ICD-10-CM

## 2021-12-16 DIAGNOSIS — R29.898 WEAKNESS OF BOTH LOWER EXTREMITIES: ICD-10-CM

## 2021-12-16 DIAGNOSIS — G12.23 PRIMARY LATERAL SCLEROSIS (HCC): Primary | ICD-10-CM

## 2021-12-16 ASSESSMENT — ENCOUNTER SYMPTOMS
WHEEZING: 0
NAUSEA: 0
RHINORRHEA: 0
COUGH: 0
DIARRHEA: 0
VOMITING: 0

## 2021-12-16 NOTE — TELEPHONE ENCOUNTER
Please fax completed progress note to [facility] for face to face documentation, thanks

## 2021-12-16 NOTE — PROGRESS NOTES
[facility]      Jose Romero is a [de-identified] y.o. female resident of [address] [place] [address]  who presents today for medical conditions/complaints as noted below. HPI:     HPI     Patient presents for face to face for mechanical lift. Patient has primary lateral sclerosis, and her gait instability and lower extremity weakness are continuing to worsen. Staff requests a sit to stand lift, so that patient can continue to use her arms to assist with movement. Without the lift, patient would be confined to bed because staff will be unable to transfer her from bed to chair, wheelchair, or commode. Otherwise she is doing well with no acute concerns. Denies fever, chills, nausea, vomiting, or diarrhea. Current Outpatient Medications   Medication Sig Dispense Refill    metoprolol succinate (TOPROL XL) 25 MG extended release tablet Take 0.5 tablets by mouth daily 30 tablet 3    Control Gel Formula Dressing (DUODERM CGF DRESSING EX) To right buttock (protection), change q3d and prn      Acetaminophen 325 MG CAPS Take 650 mg by mouth every 6 hours as needed (pain) 120 capsule 0    vitamin D 25 MCG (1000 UT) CAPS Take 1,000 Units by mouth daily      aspirin 81 MG chewable tablet Take 81 mg by mouth daily      oxybutynin (DITROPAN-XL) 5 MG extended release tablet Take 5 mg by mouth daily      furosemide (LASIX) 20 MG tablet Take 1 tablet by mouth daily 90 tablet 1    baclofen (LIORESAL) 20 MG tablet Take 20 mg by mouth every 8 hours       folic acid (FOLVITE) 1 MG tablet Take 1 mg by mouth daily      vitamin B-12 (CYANOCOBALAMIN) 500 MCG tablet Take 500 mcg by mouth daily. No current facility-administered medications for this visit.      No Known Allergies    Health Maintenance   Topic Date Due    COVID-19 Vaccine (1) Never done    Shingles Vaccine (1 of 2) Never done    DTaP/Tdap/Td vaccine (1 - Tdap) 07/17/2009    Annual Wellness Visit (AWV)  04/11/2020    Potassium monitoring 10/12/2021    Creatinine monitoring  10/12/2021    DEXA (modify frequency per FRAX score)  Completed    Flu vaccine  Completed    Pneumococcal 65+ years Vaccine  Completed    Hepatitis A vaccine  Aged Out    Hepatitis B vaccine  Aged Out    Hib vaccine  Aged Out    Meningococcal (ACWY) vaccine  Aged Out       Subjective:      Review of Systems   Constitutional: Negative for chills and fever. HENT: Negative for congestion and rhinorrhea. Respiratory: Negative for cough and wheezing. Cardiovascular: Negative for chest pain and leg swelling. Gastrointestinal: Negative for diarrhea, nausea and vomiting. Neurological: Negative for dizziness and weakness. Due to patient's clinical status, ROS of symptoms was completed by discussion with long term care facility staff, as well as with input from patient. Objective: There were no vitals filed for this visit. Physical Exam  Constitutional:       General: She is not in acute distress. Appearance: Normal appearance. HENT:      Head: Normocephalic and atraumatic. Right Ear: External ear normal.      Left Ear: External ear normal.      Nose: No rhinorrhea. Mouth/Throat:      Lips: No lesions. Eyes:      Conjunctiva/sclera: Conjunctivae normal.   Neck:      Vascular: No JVD. Pulmonary:      Effort: Pulmonary effort is normal. No accessory muscle usage or respiratory distress. Musculoskeletal:      Cervical back: Normal range of motion. Skin:     Coloration: Skin is not cyanotic or jaundiced. Neurological:      Mental Status: She is alert. Mental status is at baseline. Psychiatric:         Attention and Perception: Attention and perception normal.         Mood and Affect: Mood and affect normal.         Speech: Speech normal.         Behavior: Behavior is cooperative. Thought Content: Thought content normal.         Assessment/Plan:        1. Primary lateral sclerosis (HCC)  2. Gait instability  3.  Weakness of both lower extremities  - Recommend mechanical lift    Electronically signed by HIEU Centeno CNP on 12/16/2021 at 2:07 PM       Claudia Abrams, was evaluated through a synchronous (real-time) audio-video encounter. The patient (or guardian if applicable) is aware that this is a billable service. Verbal consent to proceed has been obtained within the past 12 months. The visit was conducted pursuant to the emergency declaration under the [address] [place] [address] authority and the [employer] General Act. Patient identification was verified, and a caregiver was present when appropriate. The patient was located in a state where the provider was credentialed to provide care. Total time spent for this encounter: Not billed by time    --HIEU Centeno CNP on 12/16/2021 at 2:07 PM    An electronic signature was used to authenticate this note.

## 2021-12-29 ENCOUNTER — HOSPITAL ENCOUNTER (OUTPATIENT)
Age: 81
Setting detail: SPECIMEN
Discharge: HOME | End: 2021-12-29
Payer: MEDICARE

## 2021-12-29 PROCEDURE — 87086 URINE CULTURE/COLONY COUNT: CPT

## 2021-12-29 PROCEDURE — 81001 URINALYSIS AUTO W/SCOPE: CPT

## 2021-12-30 ENCOUNTER — OUTSIDE SERVICES (OUTPATIENT)
Dept: INTERNAL MEDICINE | Age: 81
End: 2021-12-30
Payer: MEDICARE

## 2021-12-30 VITALS
SYSTOLIC BLOOD PRESSURE: 136 MMHG | HEART RATE: 60 BPM | OXYGEN SATURATION: 98 % | RESPIRATION RATE: 18 BRPM | WEIGHT: 162 LBS | TEMPERATURE: 97.3 F | DIASTOLIC BLOOD PRESSURE: 84 MMHG | BODY MASS INDEX: 26.96 KG/M2

## 2021-12-30 DIAGNOSIS — L89.310 PRESSURE SORE ON BUTTOCKS, RIGHT, UNSTAGEABLE (HCC): ICD-10-CM

## 2021-12-30 DIAGNOSIS — I49.1 PREMATURE ATRIAL CONTRACTIONS: ICD-10-CM

## 2021-12-30 DIAGNOSIS — E55.9 VITAMIN D DEFICIENCY: ICD-10-CM

## 2021-12-30 DIAGNOSIS — R32 URINARY INCONTINENCE, UNSPECIFIED TYPE: ICD-10-CM

## 2021-12-30 DIAGNOSIS — I10 ESSENTIAL HYPERTENSION: Primary | ICD-10-CM

## 2021-12-30 DIAGNOSIS — R26.81 GAIT INSTABILITY: ICD-10-CM

## 2021-12-30 DIAGNOSIS — G12.23 PRIMARY LATERAL SCLEROSIS (HCC): ICD-10-CM

## 2021-12-30 DIAGNOSIS — R29.898 WEAKNESS OF BOTH LOWER EXTREMITIES: ICD-10-CM

## 2021-12-30 LAB
-: ABNORMAL
AMORPHOUS: ABNORMAL
BACTERIA: ABNORMAL
BILIRUBIN URINE: NEGATIVE
CASTS UA: ABNORMAL /LPF (ref 0–2)
COLOR: ABNORMAL
COMMENT UA: ABNORMAL
CRYSTALS, UA: ABNORMAL /HPF
EPITHELIAL CELLS UA: ABNORMAL /HPF (ref 0–5)
GLUCOSE URINE: NEGATIVE
KETONES, URINE: NEGATIVE
LEUKOCYTE ESTERASE, URINE: ABNORMAL
MUCUS: ABNORMAL
NITRITE, URINE: NEGATIVE
OTHER OBSERVATIONS UA: ABNORMAL
PH UA: 8.5 (ref 5–6)
PROTEIN UA: NEGATIVE
RBC UA: ABNORMAL /HPF (ref 0–4)
RENAL EPITHELIAL, UA: ABNORMAL /HPF
SPECIFIC GRAVITY UA: 1.02 (ref 1.01–1.02)
TRICHOMONAS: ABNORMAL
TURBIDITY: ABNORMAL
URINE HGB: NEGATIVE
UROBILINOGEN, URINE: NORMAL
WBC UA: ABNORMAL /HPF (ref 0–4)
YEAST: ABNORMAL

## 2021-12-30 RX ORDER — CEFUROXIME AXETIL 250 MG/1
250 TABLET ORAL 2 TIMES DAILY
Qty: 14 TABLET | Refills: 0 | Status: SHIPPED | OUTPATIENT
Start: 2021-12-30 | End: 2022-01-06

## 2021-12-30 NOTE — PROGRESS NOTES
[facility]      Lashell Wagner is a [de-identified] y.o. female resident of [address] [place] [address]  who presents today for medical conditions/complaints as noted below. HPI:     HPI   Patient presents for evaluation and management of chronic medical conditions as noted below. Hypertension is well controlled on metoprolol and lasix. Denies chest pain or dyspnea. Continues to follow with cardiology for PAT. Has not reported any palpitations. No recent follow up with neurology for primary lateral sclerosis. Weakness has been stable. Continues on baclofen. Continues on supplement for vitamin D deficiency. Urinary incontinence stable on oxybutynin. Staff does treat sores on buttocks with PRN wound care, but have improved from previous. Current Outpatient Medications   Medication Sig Dispense Refill    metoprolol succinate (TOPROL XL) 25 MG extended release tablet Take 0.5 tablets by mouth daily 30 tablet 3    Control Gel Formula Dressing (DUODERM CGF DRESSING EX) To right buttock (protection), change q3d and prn      Acetaminophen 325 MG CAPS Take 650 mg by mouth every 6 hours as needed (pain) 120 capsule 0    vitamin D 25 MCG (1000 UT) CAPS Take 1,000 Units by mouth daily      aspirin 81 MG chewable tablet Take 81 mg by mouth daily      oxybutynin (DITROPAN-XL) 5 MG extended release tablet Take 5 mg by mouth daily      furosemide (LASIX) 20 MG tablet Take 1 tablet by mouth daily 90 tablet 1    baclofen (LIORESAL) 20 MG tablet Take 20 mg by mouth every 8 hours       folic acid (FOLVITE) 1 MG tablet Take 1 mg by mouth daily      vitamin B-12 (CYANOCOBALAMIN) 500 MCG tablet Take 500 mcg by mouth daily.  cefUROXime (CEFTIN) 250 MG tablet Take 1 tablet by mouth 2 times daily for 7 days 14 tablet 0     No current facility-administered medications for this visit.      No Known Allergies    Health Maintenance   Topic Date Due    COVID-19 Vaccine (1) Never done    Shingles Vaccine APRN - CNP on 12/31/2021 at 8:10 AM

## 2021-12-31 ENCOUNTER — TELEPHONE (OUTPATIENT)
Dept: INTERNAL MEDICINE | Age: 81
End: 2021-12-31

## 2021-12-31 LAB
CULTURE: NORMAL
Lab: NORMAL
SPECIMEN DESCRIPTION: NORMAL

## 2021-12-31 NOTE — TELEPHONE ENCOUNTER
As follow up from visit, please fax orders for vit D, BMP and CBC to [facility]

## 2022-01-04 ENCOUNTER — TELEPHONE (OUTPATIENT)
Dept: INTERNAL MEDICINE | Age: 82
End: 2022-01-04
Payer: MEDICARE

## 2022-01-04 DIAGNOSIS — G12.23 PRIMARY LATERAL SCLEROSIS (HCC): Primary | ICD-10-CM

## 2022-01-04 DIAGNOSIS — L89.310 PRESSURE SORE ON BUTTOCKS, RIGHT, UNSTAGEABLE (HCC): ICD-10-CM

## 2022-01-04 DIAGNOSIS — Z91.81 HISTORY OF FALLING: ICD-10-CM

## 2022-01-04 DIAGNOSIS — R32 URINARY INCONTINENCE, UNSPECIFIED TYPE: ICD-10-CM

## 2022-01-04 DIAGNOSIS — Z79.82 LONG-TERM USE OF ASPIRIN THERAPY: ICD-10-CM

## 2022-01-04 DIAGNOSIS — E55.9 VITAMIN D DEFICIENCY: ICD-10-CM

## 2022-01-04 PROCEDURE — G0179 MD RECERTIFICATION HHA PT: HCPCS | Performed by: NURSE PRACTITIONER

## 2022-01-04 NOTE — TELEPHONE ENCOUNTER
[facility] Re-cert forms completed; 12/28/21 to 02/25/22, On[place]

## 2022-01-12 ENCOUNTER — HOSPITAL ENCOUNTER (OUTPATIENT)
Age: 82
Setting detail: SPECIMEN
Discharge: HOME | End: 2022-01-12
Payer: MEDICARE

## 2022-01-12 DIAGNOSIS — I10 ESSENTIAL HYPERTENSION: ICD-10-CM

## 2022-01-12 DIAGNOSIS — E55.9 VITAMIN D DEFICIENCY: ICD-10-CM

## 2022-01-12 LAB
ABSOLUTE EOS #: 0.25 K/UL (ref 0–0.44)
ABSOLUTE IMMATURE GRANULOCYTE: <0.03 K/UL (ref 0–0.3)
ABSOLUTE LYMPH #: 2 K/UL (ref 1.1–3.7)
ABSOLUTE MONO #: 0.46 K/UL (ref 0.1–1.2)
ANION GAP SERPL CALCULATED.3IONS-SCNC: 9 MMOL/L (ref 9–17)
BASOPHILS # BLD: 0 % (ref 0–2)
BASOPHILS ABSOLUTE: 0.03 K/UL (ref 0–0.2)
BUN BLDV-MCNC: 13 MG/DL (ref 8–23)
BUN/CREAT BLD: 22 (ref 9–20)
CALCIUM SERPL-MCNC: 9.1 MG/DL (ref 8.6–10.4)
CHLORIDE BLD-SCNC: 105 MMOL/L (ref 98–107)
CO2: 28 MMOL/L (ref 20–31)
CREAT SERPL-MCNC: 0.6 MG/DL (ref 0.5–0.9)
DIFFERENTIAL TYPE: ABNORMAL
EOSINOPHILS RELATIVE PERCENT: 4 % (ref 1–4)
GFR AFRICAN AMERICAN: >60 ML/MIN
GFR NON-AFRICAN AMERICAN: >60 ML/MIN
GFR SERPL CREATININE-BSD FRML MDRD: ABNORMAL ML/MIN/{1.73_M2}
GFR SERPL CREATININE-BSD FRML MDRD: ABNORMAL ML/MIN/{1.73_M2}
GLUCOSE BLD-MCNC: 95 MG/DL (ref 70–99)
HCT VFR BLD CALC: 42.6 % (ref 36.3–47.1)
HEMOGLOBIN: 14.3 G/DL (ref 11.9–15.1)
IMMATURE GRANULOCYTES: 0 %
LYMPHOCYTES # BLD: 28 % (ref 24–43)
MCH RBC QN AUTO: 31 PG (ref 25.2–33.5)
MCHC RBC AUTO-ENTMCNC: 33.6 G/DL (ref 25.2–33.5)
MCV RBC AUTO: 92.4 FL (ref 82.6–102.9)
MONOCYTES # BLD: 6 % (ref 3–12)
NRBC AUTOMATED: 0 PER 100 WBC
PDW BLD-RTO: 13 % (ref 11.8–14.4)
PLATELET # BLD: 275 K/UL (ref 138–453)
PLATELET ESTIMATE: ABNORMAL
PMV BLD AUTO: 10.5 FL (ref 8.1–13.5)
POTASSIUM SERPL-SCNC: 4 MMOL/L (ref 3.7–5.3)
RBC # BLD: 4.61 M/UL (ref 3.95–5.11)
RBC # BLD: ABNORMAL 10*6/UL
SEG NEUTROPHILS: 62 % (ref 36–65)
SEGMENTED NEUTROPHILS ABSOLUTE COUNT: 4.43 K/UL (ref 1.5–8.1)
SODIUM BLD-SCNC: 142 MMOL/L (ref 135–144)
VITAMIN D 25-HYDROXY: 43.1 NG/ML (ref 30–100)
WBC # BLD: 7.2 K/UL (ref 3.5–11.3)
WBC # BLD: ABNORMAL 10*3/UL

## 2022-01-12 PROCEDURE — 36415 COLL VENOUS BLD VENIPUNCTURE: CPT

## 2022-01-12 PROCEDURE — 80048 BASIC METABOLIC PNL TOTAL CA: CPT

## 2022-01-12 PROCEDURE — 82306 VITAMIN D 25 HYDROXY: CPT

## 2022-01-12 PROCEDURE — 85025 COMPLETE CBC W/AUTO DIFF WBC: CPT

## 2022-01-14 ENCOUNTER — TELEPHONE (OUTPATIENT)
Dept: INTERNAL MEDICINE | Age: 82
End: 2022-01-14

## 2022-01-14 DIAGNOSIS — R29.898 WEAKNESS OF BOTH LOWER EXTREMITIES: ICD-10-CM

## 2022-01-14 DIAGNOSIS — L89.310 PRESSURE SORE ON BUTTOCKS, RIGHT, UNSTAGEABLE (HCC): Primary | ICD-10-CM

## 2022-01-14 DIAGNOSIS — R26.81 GAIT INSTABILITY: ICD-10-CM

## 2022-01-14 NOTE — TELEPHONE ENCOUNTER
Nova from [facility] called and requested a script for a mechanical lift be sent over today as it is supposed to be getting delivered for patient today. Please advise.

## 2022-03-08 ENCOUNTER — TELEPHONE (OUTPATIENT)
Dept: INTERNAL MEDICINE | Age: 82
End: 2022-03-08
Payer: MEDICARE

## 2022-03-08 DIAGNOSIS — I10 ESSENTIAL HYPERTENSION: ICD-10-CM

## 2022-03-08 DIAGNOSIS — M81.0 AGE-RELATED OSTEOPOROSIS WITHOUT CURRENT PATHOLOGICAL FRACTURE: ICD-10-CM

## 2022-03-08 DIAGNOSIS — G12.23 PRIMARY LATERAL SCLEROSIS (HCC): Primary | ICD-10-CM

## 2022-03-08 DIAGNOSIS — R32 URINARY INCONTINENCE, UNSPECIFIED TYPE: ICD-10-CM

## 2022-03-08 DIAGNOSIS — E55.9 VITAMIN D DEFICIENCY: ICD-10-CM

## 2022-03-08 DIAGNOSIS — Z79.82 LONG-TERM USE OF ASPIRIN THERAPY: ICD-10-CM

## 2022-03-08 PROCEDURE — G0179 MD RECERTIFICATION HHA PT: HCPCS | Performed by: NURSE PRACTITIONER

## 2022-03-31 ENCOUNTER — OUTSIDE SERVICES (OUTPATIENT)
Dept: INTERNAL MEDICINE | Age: 82
End: 2022-03-31
Payer: MEDICARE

## 2022-03-31 VITALS
RESPIRATION RATE: 18 BRPM | SYSTOLIC BLOOD PRESSURE: 135 MMHG | TEMPERATURE: 97.8 F | DIASTOLIC BLOOD PRESSURE: 66 MMHG | HEART RATE: 69 BPM | OXYGEN SATURATION: 96 %

## 2022-03-31 DIAGNOSIS — G12.23 PRIMARY LATERAL SCLEROSIS (HCC): ICD-10-CM

## 2022-03-31 DIAGNOSIS — L89.310 PRESSURE SORE ON BUTTOCKS, RIGHT, UNSTAGEABLE (HCC): ICD-10-CM

## 2022-03-31 DIAGNOSIS — I10 ESSENTIAL HYPERTENSION: Primary | ICD-10-CM

## 2022-03-31 DIAGNOSIS — E55.9 VITAMIN D DEFICIENCY: ICD-10-CM

## 2022-03-31 DIAGNOSIS — R32 URINARY INCONTINENCE, UNSPECIFIED TYPE: ICD-10-CM

## 2022-03-31 DIAGNOSIS — I49.1 PREMATURE ATRIAL CONTRACTIONS: ICD-10-CM

## 2022-03-31 DIAGNOSIS — M81.0 AGE-RELATED OSTEOPOROSIS WITHOUT CURRENT PATHOLOGICAL FRACTURE: ICD-10-CM

## 2022-03-31 DIAGNOSIS — R26.81 GAIT INSTABILITY: ICD-10-CM

## 2022-03-31 ASSESSMENT — ENCOUNTER SYMPTOMS
DIARRHEA: 0
NAUSEA: 0
RHINORRHEA: 0
WHEEZING: 0
COUGH: 0
VOMITING: 0

## 2022-03-31 NOTE — PROGRESS NOTES
[facility]      Tiburcio Alexandra is a 81 y.o. female resident of [address] [place] [address]  who presents today for medical conditions/complaints as noted below. HPI:     HPI  Patient presents via virtual visit with assisted living staff member for evaluation and management of medical conditions as noted below. Continues on metoprolol and lasix for hypertension. This is generally well controlled, though staff does note some variation during the day, sometimes SBP in 150's prior to meds. Denies chest pain or dyspnea. Follows with cardiology for premature atrial contractions. Denies palpitations. Previously declined further follow up with neurology for primary lateral sclerosis, symptoms stable. Continues on baclofen. Weakness stable, continues to have assistance with staff for ADL. Continues on supplement for vitamin D deficiency. Urinary incontinence stable, continues on oxybutynin.       Current Outpatient Medications   Medication Sig Dispense Refill    metoprolol succinate (TOPROL XL) 25 MG extended release tablet Take 0.5 tablets by mouth daily 30 tablet 3    Control Gel Formula Dressing (DUODERM CGF DRESSING EX) To right buttock (protection), change q3d and prn      Acetaminophen 325 MG CAPS Take 650 mg by mouth every 6 hours as needed (pain) 120 capsule 0    vitamin D 25 MCG (1000 UT) CAPS Take 1,000 Units by mouth daily      aspirin 81 MG chewable tablet Take 81 mg by mouth daily      oxybutynin (DITROPAN-XL) 5 MG extended release tablet Take 5 mg by mouth daily      furosemide (LASIX) 20 MG tablet Take 1 tablet by mouth daily 90 tablet 1    baclofen (LIORESAL) 20 MG tablet Take 20 mg by mouth every 8 hours       folic acid (FOLVITE) 1 MG tablet Take 1 mg by mouth daily      vitamin B-12 (CYANOCOBALAMIN) 500 MCG tablet Take 500 mcg by mouth daily. No current facility-administered medications for this visit.      No Known Allergies    Health Maintenance Unable to wean off PPI due to symptoms  Given lower dose of pantoprazole  Topic Date Due    COVID-19 Vaccine (1) Never done    Shingles Vaccine (1 of 2) Never done    DTaP/Tdap/Td vaccine (1 - Tdap) 07/17/2009    Annual Wellness Visit (AWV)  04/11/2020    Depression Screen  06/01/2021    Potassium monitoring  01/12/2023    Creatinine monitoring  01/12/2023    DEXA (modify frequency per FRAX score)  Completed    Flu vaccine  Completed    Pneumococcal 65+ years Vaccine  Completed    Hepatitis A vaccine  Aged Out    Hepatitis B vaccine  Aged Out    Hib vaccine  Aged Out    Meningococcal (ACWY) vaccine  Aged Out       Subjective:      Review of Systems   Constitutional: Negative for chills and fever. HENT: Negative for congestion and rhinorrhea. Respiratory: Negative for cough and wheezing. Cardiovascular: Negative for chest pain and leg swelling. Gastrointestinal: Negative for diarrhea, nausea and vomiting. Musculoskeletal: Positive for gait problem. Neurological: Positive for weakness. Negative for dizziness. Objective:     Vitals:    03/31/22 0941   BP: 135/66   Pulse: 69   Resp: 18   Temp: 97.8 °F (36.6 °C)   SpO2: 96%     Physical Exam  Constitutional:       General: She is not in acute distress. Appearance: Normal appearance. HENT:      Head: Normocephalic and atraumatic. Right Ear: External ear normal.      Left Ear: External ear normal.      Nose: No rhinorrhea. Mouth/Throat:      Lips: No lesions. Eyes:      Conjunctiva/sclera: Conjunctivae normal.   Neck:      Vascular: No JVD. Pulmonary:      Effort: Pulmonary effort is normal. No accessory muscle usage or respiratory distress. Musculoskeletal:      Cervical back: Normal range of motion. Skin:     Coloration: Skin is not cyanotic or jaundiced. Neurological:      Mental Status: She is alert. Mental status is at baseline.    Psychiatric:         Attention and Perception: Attention and perception normal.         Mood and Affect: Mood and affect normal.         Speech: Speech normal.         Behavior: Behavior is cooperative. Thought Content: Thought content normal.         Assessment/Plan:        1. Essential hypertension,  stable  - Continue current medications    2. Premature atrial contractions, stable  - Continue to follow with cardiology    3. Gait instability, stable  - Continue to monitor    4. Primary lateral sclerosis ([address]  stable  - Continue current medications    5. Vitamin D deficiency, stable  - Continue supplements    6. Urinary incontinence, unspecified type,  stable  - Continue current medications    7. Pressure sore on buttocks, right, unstageable ([address] improving  - Monitor for resolution    8. Age-related osteoporosis without current pathological fracture, stable  - Continue to monitor        Return in about 3 months (around 6/30/2022). No orders of the defined types were placed in this encounter. No orders of the defined types were placed in this encounter. Electronically signed by HIEU Mccauley CNP on 3/31/2022 at 12:37 PM       Dyana Silva, was evaluated through a synchronous (real-time) audio-video encounter. The patient (or guardian if applicable) is aware that this is a billable service, which includes applicable co-pays. This Virtual Visit was conducted with patient's (and/or legal guardian's) consent. The visit was conducted pursuant to the emergency declaration under the [address] [place] [address], [address] [place] [address] authority and the [employer] General Act. Patient identification was verified, and a caregiver was present when appropriate. The patient was located at home in a state where the provider was licensed to provide care. Total time spent for this encounter: Not billed by time    --HIEU Mccauley CNP on 3/31/2022 at 12:37 PM    An electronic signature was used to authenticate this note.

## 2022-05-03 ENCOUNTER — TELEPHONE (OUTPATIENT)
Dept: INTERNAL MEDICINE | Age: 82
End: 2022-05-03
Payer: MEDICARE

## 2022-05-03 DIAGNOSIS — I10 ESSENTIAL HYPERTENSION: ICD-10-CM

## 2022-05-03 DIAGNOSIS — E55.9 VITAMIN D DEFICIENCY: ICD-10-CM

## 2022-05-03 DIAGNOSIS — Z79.82 LONG-TERM USE OF ASPIRIN THERAPY: ICD-10-CM

## 2022-05-03 DIAGNOSIS — L89.310 PRESSURE SORE ON BUTTOCKS, RIGHT, UNSTAGEABLE (HCC): ICD-10-CM

## 2022-05-03 DIAGNOSIS — G12.23 PRIMARY LATERAL SCLEROSIS (HCC): Primary | ICD-10-CM

## 2022-05-03 DIAGNOSIS — R32 URINARY INCONTINENCE, UNSPECIFIED TYPE: ICD-10-CM

## 2022-05-03 PROCEDURE — G0179 MD RECERTIFICATION HHA PT: HCPCS | Performed by: NURSE PRACTITIONER

## 2022-05-22 ENCOUNTER — HOSPITAL ENCOUNTER (INPATIENT)
Age: 82
LOS: 3 days | Discharge: HOME HEALTH | DRG: 158 | End: 2022-05-25
Attending: EMERGENCY MEDICINE | Admitting: INTERNAL MEDICINE
Payer: MEDICARE

## 2022-05-22 DIAGNOSIS — R79.89 ELEVATED LACTIC ACID LEVEL: ICD-10-CM

## 2022-05-22 DIAGNOSIS — L03.211 FACIAL CELLULITIS: Primary | ICD-10-CM

## 2022-05-22 DIAGNOSIS — K04.7 DENTAL INFECTION: ICD-10-CM

## 2022-05-22 LAB
ABSOLUTE EOS #: 0.08 K/UL (ref 0–0.44)
ABSOLUTE IMMATURE GRANULOCYTE: 0.07 K/UL (ref 0–0.3)
ABSOLUTE LYMPH #: 1.34 K/UL (ref 1.1–3.7)
ABSOLUTE MONO #: 0.77 K/UL (ref 0.1–1.2)
ANION GAP SERPL CALCULATED.3IONS-SCNC: 12 MMOL/L (ref 9–17)
BASOPHILS # BLD: 0 % (ref 0–2)
BASOPHILS ABSOLUTE: 0.04 K/UL (ref 0–0.2)
BUN BLDV-MCNC: 8 MG/DL (ref 8–23)
BUN/CREAT BLD: 11 (ref 9–20)
CALCIUM SERPL-MCNC: 9.3 MG/DL (ref 8.6–10.4)
CHLORIDE BLD-SCNC: 97 MMOL/L (ref 98–107)
CO2: 26 MMOL/L (ref 20–31)
CREAT SERPL-MCNC: 0.7 MG/DL (ref 0.5–0.9)
EOSINOPHILS RELATIVE PERCENT: 1 % (ref 1–4)
GFR AFRICAN AMERICAN: >60 ML/MIN
GFR NON-AFRICAN AMERICAN: >60 ML/MIN
GFR SERPL CREATININE-BSD FRML MDRD: ABNORMAL ML/MIN/{1.73_M2}
GLUCOSE BLD-MCNC: 175 MG/DL (ref 70–99)
HCT VFR BLD CALC: 47.8 % (ref 36.3–47.1)
HEMOGLOBIN: 16.1 G/DL (ref 11.9–15.1)
IMMATURE GRANULOCYTES: 1 %
LACTIC ACID: 2.1 MMOL/L (ref 0.5–2.2)
LACTIC ACID: 2.5 MMOL/L (ref 0.5–2.2)
LYMPHOCYTES # BLD: 10 % (ref 24–43)
MCH RBC QN AUTO: 31.1 PG (ref 25.2–33.5)
MCHC RBC AUTO-ENTMCNC: 33.7 G/DL (ref 25.2–33.5)
MCV RBC AUTO: 92.5 FL (ref 82.6–102.9)
MONOCYTES # BLD: 6 % (ref 3–12)
NRBC AUTOMATED: 0 PER 100 WBC
PDW BLD-RTO: 13 % (ref 11.8–14.4)
PLATELET # BLD: 317 K/UL (ref 138–453)
PMV BLD AUTO: 10.7 FL (ref 8.1–13.5)
POTASSIUM SERPL-SCNC: 3.9 MMOL/L (ref 3.7–5.3)
RBC # BLD: 5.17 M/UL (ref 3.95–5.11)
SARS-COV-2, RAPID: NOT DETECTED
SEG NEUTROPHILS: 82 % (ref 36–65)
SEGMENTED NEUTROPHILS ABSOLUTE COUNT: 10.58 K/UL (ref 1.5–8.1)
SODIUM BLD-SCNC: 135 MMOL/L (ref 135–144)
SPECIMEN DESCRIPTION: NORMAL
WBC # BLD: 12.9 K/UL (ref 3.5–11.3)

## 2022-05-22 PROCEDURE — 87040 BLOOD CULTURE FOR BACTERIA: CPT

## 2022-05-22 PROCEDURE — 85025 COMPLETE CBC W/AUTO DIFF WBC: CPT

## 2022-05-22 PROCEDURE — 6360000002 HC RX W HCPCS: Performed by: FAMILY MEDICINE

## 2022-05-22 PROCEDURE — 6360000002 HC RX W HCPCS: Performed by: EMERGENCY MEDICINE

## 2022-05-22 PROCEDURE — 80048 BASIC METABOLIC PNL TOTAL CA: CPT

## 2022-05-22 PROCEDURE — 87635 SARS-COV-2 COVID-19 AMP PRB: CPT

## 2022-05-22 PROCEDURE — 2500000003 HC RX 250 WO HCPCS: Performed by: FAMILY MEDICINE

## 2022-05-22 PROCEDURE — 2500000003 HC RX 250 WO HCPCS: Performed by: EMERGENCY MEDICINE

## 2022-05-22 PROCEDURE — 99222 1ST HOSP IP/OBS MODERATE 55: CPT

## 2022-05-22 PROCEDURE — 6370000000 HC RX 637 (ALT 250 FOR IP): Performed by: FAMILY MEDICINE

## 2022-05-22 PROCEDURE — 2060000000 HC ICU INTERMEDIATE R&B

## 2022-05-22 PROCEDURE — 83605 ASSAY OF LACTIC ACID: CPT

## 2022-05-22 PROCEDURE — 96374 THER/PROPH/DIAG INJ IV PUSH: CPT

## 2022-05-22 PROCEDURE — 99285 EMERGENCY DEPT VISIT HI MDM: CPT

## 2022-05-22 PROCEDURE — 96375 TX/PRO/DX INJ NEW DRUG ADDON: CPT

## 2022-05-22 PROCEDURE — 2580000003 HC RX 258: Performed by: FAMILY MEDICINE

## 2022-05-22 PROCEDURE — 36415 COLL VENOUS BLD VENIPUNCTURE: CPT

## 2022-05-22 RX ORDER — HYDROCODONE BITARTRATE AND ACETAMINOPHEN 5; 325 MG/1; MG/1
1 TABLET ORAL EVERY 4 HOURS PRN
Status: DISCONTINUED | OUTPATIENT
Start: 2022-05-22 | End: 2022-05-25 | Stop reason: HOSPADM

## 2022-05-22 RX ORDER — FOLIC ACID 1 MG/1
1 TABLET ORAL DAILY
Status: DISCONTINUED | OUTPATIENT
Start: 2022-05-23 | End: 2022-05-25 | Stop reason: HOSPADM

## 2022-05-22 RX ORDER — ENOXAPARIN SODIUM 100 MG/ML
30 INJECTION SUBCUTANEOUS DAILY
Status: DISCONTINUED | OUTPATIENT
Start: 2022-05-22 | End: 2022-05-22 | Stop reason: DRUGHIGH

## 2022-05-22 RX ORDER — VITAMIN B COMPLEX
1000 TABLET ORAL DAILY
Status: DISCONTINUED | OUTPATIENT
Start: 2022-05-23 | End: 2022-05-25 | Stop reason: HOSPADM

## 2022-05-22 RX ORDER — SODIUM CHLORIDE 9 MG/ML
INJECTION, SOLUTION INTRAVENOUS CONTINUOUS
Status: DISCONTINUED | OUTPATIENT
Start: 2022-05-22 | End: 2022-05-23

## 2022-05-22 RX ORDER — BACLOFEN 10 MG/1
20 TABLET ORAL EVERY 8 HOURS
Status: DISCONTINUED | OUTPATIENT
Start: 2022-05-22 | End: 2022-05-25 | Stop reason: HOSPADM

## 2022-05-22 RX ORDER — LANOLIN ALCOHOL/MO/W.PET/CERES
500 CREAM (GRAM) TOPICAL DAILY
Status: DISCONTINUED | OUTPATIENT
Start: 2022-05-23 | End: 2022-05-25 | Stop reason: HOSPADM

## 2022-05-22 RX ORDER — METOPROLOL SUCCINATE 25 MG/1
12.5 TABLET, EXTENDED RELEASE ORAL DAILY
Status: DISCONTINUED | OUTPATIENT
Start: 2022-05-23 | End: 2022-05-25 | Stop reason: HOSPADM

## 2022-05-22 RX ORDER — KETOROLAC TROMETHAMINE 30 MG/ML
15 INJECTION, SOLUTION INTRAMUSCULAR; INTRAVENOUS ONCE
Status: COMPLETED | OUTPATIENT
Start: 2022-05-22 | End: 2022-05-22

## 2022-05-22 RX ORDER — CLINDAMYCIN PHOSPHATE 600 MG/50ML
600 INJECTION INTRAVENOUS EVERY 8 HOURS
Status: DISCONTINUED | OUTPATIENT
Start: 2022-05-22 | End: 2022-05-25 | Stop reason: HOSPADM

## 2022-05-22 RX ORDER — CLINDAMYCIN PHOSPHATE 900 MG/50ML
900 INJECTION INTRAVENOUS 3 TIMES DAILY
Status: DISCONTINUED | OUTPATIENT
Start: 2022-05-22 | End: 2022-05-22

## 2022-05-22 RX ORDER — HYDROCODONE BITARTRATE AND ACETAMINOPHEN 5; 325 MG/1; MG/1
2 TABLET ORAL EVERY 4 HOURS PRN
Status: DISCONTINUED | OUTPATIENT
Start: 2022-05-22 | End: 2022-05-25 | Stop reason: HOSPADM

## 2022-05-22 RX ORDER — ENOXAPARIN SODIUM 100 MG/ML
40 INJECTION SUBCUTANEOUS DAILY
Status: DISCONTINUED | OUTPATIENT
Start: 2022-05-22 | End: 2022-05-25 | Stop reason: HOSPADM

## 2022-05-22 RX ORDER — CLINDAMYCIN PHOSPHATE 900 MG/50ML
900 INJECTION INTRAVENOUS ONCE
Status: COMPLETED | OUTPATIENT
Start: 2022-05-22 | End: 2022-05-22

## 2022-05-22 RX ORDER — FUROSEMIDE 20 MG/1
20 TABLET ORAL DAILY
Status: DISCONTINUED | OUTPATIENT
Start: 2022-05-23 | End: 2022-05-23

## 2022-05-22 RX ORDER — ASPIRIN 81 MG/1
81 TABLET, CHEWABLE ORAL DAILY
Status: DISCONTINUED | OUTPATIENT
Start: 2022-05-23 | End: 2022-05-25 | Stop reason: HOSPADM

## 2022-05-22 RX ORDER — OXYBUTYNIN CHLORIDE 5 MG/1
5 TABLET, EXTENDED RELEASE ORAL DAILY
Status: DISCONTINUED | OUTPATIENT
Start: 2022-05-23 | End: 2022-05-25 | Stop reason: HOSPADM

## 2022-05-22 RX ADMIN — BACLOFEN 20 MG: 10 TABLET ORAL at 23:14

## 2022-05-22 RX ADMIN — CLINDAMYCIN PHOSPHATE 600 MG: 600 INJECTION, SOLUTION INTRAVENOUS at 23:18

## 2022-05-22 RX ADMIN — ENOXAPARIN SODIUM 40 MG: 100 INJECTION SUBCUTANEOUS at 17:08

## 2022-05-22 RX ADMIN — KETOROLAC TROMETHAMINE 15 MG: 30 INJECTION, SOLUTION INTRAMUSCULAR at 14:09

## 2022-05-22 RX ADMIN — SODIUM CHLORIDE: 9 INJECTION, SOLUTION INTRAVENOUS at 18:50

## 2022-05-22 RX ADMIN — CLINDAMYCIN PHOSPHATE 900 MG: 900 INJECTION, SOLUTION INTRAVENOUS at 14:13

## 2022-05-22 ASSESSMENT — PAIN SCALES - GENERAL
PAINLEVEL_OUTOF10: 3
PAINLEVEL_OUTOF10: 10
PAINLEVEL_OUTOF10: 10

## 2022-05-22 ASSESSMENT — PAIN DESCRIPTION - LOCATION: LOCATION: TEETH

## 2022-05-22 ASSESSMENT — PAIN DESCRIPTION - ORIENTATION
ORIENTATION: LEFT;LOWER
ORIENTATION: LEFT;LOWER

## 2022-05-22 ASSESSMENT — PAIN - FUNCTIONAL ASSESSMENT
PAIN_FUNCTIONAL_ASSESSMENT: ACTIVITIES ARE NOT PREVENTED
PAIN_FUNCTIONAL_ASSESSMENT: 0-10

## 2022-05-22 ASSESSMENT — PAIN DESCRIPTION - DESCRIPTORS: DESCRIPTORS: DISCOMFORT

## 2022-05-22 NOTE — ED PROVIDER NOTES
[facility] ED  [address]  [place] [address]  Phone: [phone]        Pt Name: Migdalia Carrillo  MRN: [id number]  Tawandagfjorge l 1940  Date of evaluation: 5/22/22      CHIEF COMPLAINT     Chief Complaint   Patient presents with    Dental Pain         HISTORY OF PRESENT ILLNESS  (Location/Symptom, Timing/Onset, Context/Setting, Quality, Duration, Modifying Factors, Severity.)    Migdalia Carrillo is a 81 y.o. female who presents with dental pain and swelling to the left side of the face. Family report when they saw her on Friday she was fine. They picked her up to take her to [facility] today and noticed that she had a small amount of swelling to the left side of the face. They were at [facility] the swelling got significantly larger. Patient states she has had the pain in her tooth for the last 2 days. Denies any fever or chills. Denies any difficulty swallowing. Patient denies any sore throat cough or congestion. She denies any shortness of breath or chest pain. Denies any abdominal pain nausea vomiting diarrhea or constipation. Denies any dysuria. Denies any headache or dizziness. REVIEW OF SYSTEMS    (2-9 systems for level 4, 10 or more for level 5)     Review of Systems systems are reviewed and are negative except was present in the HPI    [address]    has a past medical history of Hypertension, Osteoarthritis, Osteoporosis, and Primary lateral sclerosis ([facility] [address] SURGICAL HISTORY      has a past surgical history that includes Appendectomy (11/1977); Tonsillectomy and adenoidectomy (1946); Hysterectomy, total abdominal (1977); and Cataract removal (Bilateral, 2018).     CURRENTMEDICATIONS       Previous Medications    ACETAMINOPHEN 325 MG CAPS    Take 650 mg by mouth every 6 hours as needed (pain)    ASPIRIN 81 MG CHEWABLE TABLET    Take 81 mg by mouth daily    BACLOFEN (LIORESAL) 20 MG TABLET    Take 20 mg by mouth every 8 hours     CONTROL GEL FORMULA DRESSING (DUODERM CGF DRESSING EX)    To right buttock (protection), change Y3A and prn    FOLIC ACID (FOLVITE) 1 MG TABLET    Take 1 mg by mouth daily    FUROSEMIDE (LASIX) 20 MG TABLET    Take 1 tablet by mouth daily    METOPROLOL SUCCINATE (TOPROL XL) 25 MG EXTENDED RELEASE TABLET    Take 0.5 tablets by mouth daily    OXYBUTYNIN (DITROPAN-XL) 5 MG EXTENDED RELEASE TABLET    Take 5 mg by mouth daily    VITAMIN B-12 (CYANOCOBALAMIN) 500 MCG TABLET    Take 500 mcg by mouth daily. VITAMIN D 25 MCG (1000 UT) CAPS    Take 1,000 Units by mouth daily       ALLERGIES     has No Known Allergies. FAMILY HISTORY     She indicated that her mother is . She indicated that her father is . She indicated that the status of her brother is unknown. She indicated that the status of her maternal aunt is unknown.     family history includes Breast Cancer in her maternal aunt; Cancer in her brother; Coronary Art Dis in her father; Heart Disease in her mother; Migraines in her father and mother; Other in her father. SOCIAL HISTORY      reports that she has never smoked. She has never used smokeless tobacco. She reports that she does not drink alcohol and does not use drugs. PHYSICAL EXAM    (up to 7 for level 4, 8 or more for level 5)   INITIAL VITALS:  tympanic temperature is 98.4 °F (36.9 °C). Her blood pressure is 172/98 (abnormal) and her pulse is 78. Her respiration is 20 and oxygen saturation is 94%. Physical Exam  Constitutional:       General: She is in acute distress. Comments: Patient is in moderate distress secondary to the dental infection with facial swelling on the left. HENT:      Head: Normocephalic and atraumatic. No right periorbital erythema or left periorbital erythema. Jaw: Tenderness and swelling present. No trismus or malocclusion.         Right Ear: Tympanic membrane, ear canal and external ear normal.      Left Ear: Tympanic membrane, ear canal and external ear normal.      Mouth/Throat: Mouth: Mucous membranes are moist.      Dentition: Abnormal dentition. Dental tenderness, gingival swelling and dental caries present. Pharynx: Oropharynx is clear. No pharyngeal swelling, oropharyngeal exudate, posterior oropharyngeal erythema or uvula swelling. Comments: Patient has multiple teeth broken off at the gumline involving the upper and lower molars on the left. There is swelling to the gums but no specific abscess identified. Cardiovascular:      Rate and Rhythm: Normal rate and regular rhythm. Pulses: Normal pulses. Heart sounds: Normal heart sounds. Pulmonary:      Effort: Pulmonary effort is normal. No tachypnea, bradypnea, accessory muscle usage, prolonged expiration, respiratory distress or retractions. Breath sounds: Normal breath sounds and air entry. Abdominal:      General: Bowel sounds are normal.      Palpations: Abdomen is soft. Tenderness: There is no abdominal tenderness. There is no right CVA tenderness, left CVA tenderness, guarding or rebound. Negative signs include Dickson's sign, Rovsing's sign and McBurney's sign. Musculoskeletal:      Cervical back: Normal range of motion. Right lower leg: No edema. Left lower leg: No edema. Lymphadenopathy:      Cervical: No cervical adenopathy. Right cervical: No superficial, deep or posterior cervical adenopathy. Left cervical: No superficial, deep or posterior cervical adenopathy. Skin:     General: Skin is warm. Capillary Refill: Capillary refill takes less than 2 seconds. Findings: No erythema. Comments: Facial swelling on the left   Neurological:      General: No focal deficit present. Mental Status: She is alert. GCS: GCS eye subscore is 4. GCS verbal subscore is 5. GCS motor subscore is 6. Cranial Nerves: Cranial nerves are intact. Sensory: Sensation is intact. Motor: Motor function is intact. Coordination: Coordination is intact. Gait: Gait is intact. Psychiatric:         Attention and Perception: Attention normal.         Mood and Affect: Mood normal.         Speech: Speech normal.         Behavior: Behavior normal.         Thought Content: Thought content normal.         DIFFERENTIAL DIAGNOSIS/ MDM:     Dental infection with facial cellulitis. No signs of sepsis. Patient is not tachycardic and she is afebrile. No signs of Ludewig's angina.      DIAGNOSTIC RESULTS     EKG: All EKG's are interpreted by the Emergency Department Physician who either signs or Co-signs this chart in the absence of a cardiologist.        RADIOLOGY:        Interpretation per the Radiologist below, if available at the time of this note:        LABS:  Results for orders placed or performed during the hospital encounter of 05/22/22   CBC with Auto Differential   Result Value Ref Range    WBC 12.9 (H) 3.5 - 11.3 k/uL    RBC 5.17 (H) 3.95 - 5.11 m/uL    Hemoglobin 16.1 (H) 11.9 - 15.1 g/dL    Hematocrit 47.8 (H) 36.3 - 47.1 %    MCV 92.5 82.6 - 102.9 fL    MCH 31.1 25.2 - 33.5 pg    MCHC 33.7 (H) 25.2 - 33.5 g/dL    RDW 13.0 11.8 - 14.4 %    Platelets 330 997 - 838 k/uL    MPV 10.7 8.1 - 13.5 fL    NRBC Automated 0.0 0.0 per 100 WBC    Seg Neutrophils 82 (H) 36 - 65 %    Lymphocytes 10 (L) 24 - 43 %    Monocytes 6 3 - 12 %    Eosinophils % 1 1 - 4 %    Basophils 0 0 - 2 %    Immature Granulocytes 1 (H) 0 %    Segs Absolute 10.58 (H) 1.50 - 8.10 k/uL    Absolute Lymph # 1.34 1.10 - 3.70 k/uL    Absolute Mono # 0.77 0.10 - 1.20 k/uL    Absolute Eos # 0.08 0.00 - 0.44 k/uL    Basophils Absolute 0.04 0.00 - 0.20 k/uL    Absolute Immature Granulocyte 0.07 0.00 - 0.30 k/uL   Basic Metabolic Panel w/ Reflex to MG   Result Value Ref Range    Glucose 175 (H) 70 - 99 mg/dL    BUN 8 8 - 23 mg/dL    CREATININE 0.70 0.50 - 0.90 mg/dL    Bun/Cre Ratio 11 9 - 20    Calcium 9.3 8.6 - 10.4 mg/dL    Sodium 135 135 - 144 mmol/L    Potassium 3.9 3.7 - 5.3 mmol/L    Chloride 97 (L) 98 - 107 mmol/L    CO2 26 20 - 31 mmol/L    Anion Gap 12 9 - 17 mmol/L    GFR Non-African American >60 >60 mL/min    GFR African American >60 >60 mL/min    GFR Comment         Lactic Acid   Result Value Ref Range    Lactic Acid 2.5 (H) 0.5 - 2.2 mmol/L       Patient has an elevated white count with a mildly elevated lactic acid of 2.5. She has received clindamycin 900 mg IV piggyback. A dental infection with facial cellulitis. EMERGENCY DEPARTMENT COURSE:   Vitals:    Vitals:    05/22/22 1331   BP: (!) 172/98   Pulse: 78   Resp: 20   Temp: 98.4 °F (36.9 °C)   TempSrc: Tympanic   SpO2: 94%     -------------------------  BP: (!) 172/98, Temp: 98.4 °F (36.9 °C), Pulse: 78, Resp: 20      RE-EVALUATION:  2:45 PM EDT patient has a dental infection with facial cellulitis. Lactic acid is elevated at 2.5 as well as an elevated white blood cell count. Blood cultures have been drawn with results pending. Will discuss with hospitalist about admission for continued IV antibiotics. She has received clindamycin 900 mg IV piggyback. CONSULTS:  2:47 PM EDT  excepted patient for admission    PROCEDURES:  None    FINAL IMPRESSION      1. Facial cellulitis    2. Dental infection    3. Elevated lactic acid level          DISPOSITION/PLAN   DISPOSITION        CONDITION ON DISPOSITION:   Stable    PATIENT REFERRED TO:  No follow-up provider specified. DISCHARGE MEDICATIONS:  New Prescriptions    No medications on file       (Please note that portions of this note were completed with a voicerecognition program.  Efforts were made to edit the dictations but occasionally words are mis-transcribed. )    Olivia Coppola DO, , MD, F.A.C.E.P.   Attending Emergency Medicine Physician        Klaus Villal[place], [place]  05/22/22 0004

## 2022-05-22 NOTE — PROGRESS NOTES
Pharmacy Note     Enoxaparin Dose Adjustment    Nelva Councilman is a 81 y.o. female. Pharmacist assessment of enoxaparin dose for VTE prophylaxis. Recent Labs     05/22/22  1403   BUN 8       Recent Labs     05/22/22  1403   CREATININE 0.70       Estimated Creatinine Clearance: 65 mL/min (based on SCr of 0.7 mg/dL). Estimated CrCl using Ideal Body Weight: 51.6 mL/min (based on IBW 59 kg)    Height:   Ht Readings from Last 1 Encounters:   05/22/22 5' 6\" (1.676 m)     Weight:  Wt Readings from Last 1 Encounters:   05/22/22 165 lb 8 oz (75.1 kg)       The following enoxaparin dose has been adjusted based upon renal function and/or patient weight per P&T Guidelines:             Enoxaparin 30 mg subQ daily modified to 40 mg subQ daily. Jose C Meier, Pharm. D.  5/22/2022 3:51 PM

## 2022-05-23 LAB
ABSOLUTE EOS #: 0.08 K/UL (ref 0–0.44)
ABSOLUTE IMMATURE GRANULOCYTE: 0.03 K/UL (ref 0–0.3)
ABSOLUTE LYMPH #: 1.41 K/UL (ref 1.1–3.7)
ABSOLUTE MONO #: 0.66 K/UL (ref 0.1–1.2)
ANION GAP SERPL CALCULATED.3IONS-SCNC: 10 MMOL/L (ref 9–17)
BASOPHILS # BLD: 0 % (ref 0–2)
BASOPHILS ABSOLUTE: 0.03 K/UL (ref 0–0.2)
BUN BLDV-MCNC: 8 MG/DL (ref 8–23)
BUN/CREAT BLD: 16 (ref 9–20)
CALCIUM SERPL-MCNC: 8.5 MG/DL (ref 8.6–10.4)
CHLORIDE BLD-SCNC: 101 MMOL/L (ref 98–107)
CO2: 24 MMOL/L (ref 20–31)
CREAT SERPL-MCNC: 0.49 MG/DL (ref 0.5–0.9)
EOSINOPHILS RELATIVE PERCENT: 1 % (ref 1–4)
GFR AFRICAN AMERICAN: >60 ML/MIN
GFR NON-AFRICAN AMERICAN: >60 ML/MIN
GFR SERPL CREATININE-BSD FRML MDRD: ABNORMAL ML/MIN/{1.73_M2}
GLUCOSE BLD-MCNC: 124 MG/DL (ref 70–99)
HCT VFR BLD CALC: 42.6 % (ref 36.3–47.1)
HEMOGLOBIN: 14.6 G/DL (ref 11.9–15.1)
IMMATURE GRANULOCYTES: 0 %
LYMPHOCYTES # BLD: 14 % (ref 24–43)
MCH RBC QN AUTO: 31.2 PG (ref 25.2–33.5)
MCHC RBC AUTO-ENTMCNC: 34.3 G/DL (ref 25.2–33.5)
MCV RBC AUTO: 91 FL (ref 82.6–102.9)
MONOCYTES # BLD: 6 % (ref 3–12)
NRBC AUTOMATED: 0 PER 100 WBC
PDW BLD-RTO: 13 % (ref 11.8–14.4)
PLATELET # BLD: 296 K/UL (ref 138–453)
PMV BLD AUTO: 10.8 FL (ref 8.1–13.5)
POTASSIUM SERPL-SCNC: 3.8 MMOL/L (ref 3.7–5.3)
RBC # BLD: 4.68 M/UL (ref 3.95–5.11)
SEG NEUTROPHILS: 79 % (ref 36–65)
SEGMENTED NEUTROPHILS ABSOLUTE COUNT: 8.23 K/UL (ref 1.5–8.1)
SODIUM BLD-SCNC: 135 MMOL/L (ref 135–144)
WBC # BLD: 10.4 K/UL (ref 3.5–11.3)

## 2022-05-23 PROCEDURE — 6370000000 HC RX 637 (ALT 250 FOR IP): Performed by: FAMILY MEDICINE

## 2022-05-23 PROCEDURE — 99222 1ST HOSP IP/OBS MODERATE 55: CPT | Performed by: INTERNAL MEDICINE

## 2022-05-23 PROCEDURE — 2500000003 HC RX 250 WO HCPCS: Performed by: FAMILY MEDICINE

## 2022-05-23 PROCEDURE — 6360000002 HC RX W HCPCS: Performed by: FAMILY MEDICINE

## 2022-05-23 PROCEDURE — 85025 COMPLETE CBC W/AUTO DIFF WBC: CPT

## 2022-05-23 PROCEDURE — 80048 BASIC METABOLIC PNL TOTAL CA: CPT

## 2022-05-23 PROCEDURE — 6370000000 HC RX 637 (ALT 250 FOR IP): Performed by: INTERNAL MEDICINE

## 2022-05-23 PROCEDURE — 97165 OT EVAL LOW COMPLEX 30 MIN: CPT | Performed by: OCCUPATIONAL THERAPIST

## 2022-05-23 PROCEDURE — 97161 PT EVAL LOW COMPLEX 20 MIN: CPT

## 2022-05-23 PROCEDURE — 2580000003 HC RX 258: Performed by: FAMILY MEDICINE

## 2022-05-23 PROCEDURE — 2060000000 HC ICU INTERMEDIATE R&B

## 2022-05-23 PROCEDURE — 36415 COLL VENOUS BLD VENIPUNCTURE: CPT

## 2022-05-23 PROCEDURE — 2580000003 HC RX 258: Performed by: INTERNAL MEDICINE

## 2022-05-23 RX ORDER — SODIUM CHLORIDE 0.9 % (FLUSH) 0.9 %
5-40 SYRINGE (ML) INJECTION PRN
Status: DISCONTINUED | OUTPATIENT
Start: 2022-05-23 | End: 2022-05-25 | Stop reason: HOSPADM

## 2022-05-23 RX ORDER — FUROSEMIDE 20 MG/1
20 TABLET ORAL DAILY
Status: DISCONTINUED | OUTPATIENT
Start: 2022-05-23 | End: 2022-05-25 | Stop reason: HOSPADM

## 2022-05-23 RX ORDER — SODIUM CHLORIDE 0.9 % (FLUSH) 0.9 %
5-40 SYRINGE (ML) INJECTION EVERY 12 HOURS SCHEDULED
Status: DISCONTINUED | OUTPATIENT
Start: 2022-05-23 | End: 2022-05-25 | Stop reason: HOSPADM

## 2022-05-23 RX ORDER — SODIUM CHLORIDE 9 MG/ML
INJECTION, SOLUTION INTRAVENOUS PRN
Status: DISCONTINUED | OUTPATIENT
Start: 2022-05-23 | End: 2022-05-25 | Stop reason: HOSPADM

## 2022-05-23 RX ADMIN — BACLOFEN 20 MG: 10 TABLET ORAL at 22:46

## 2022-05-23 RX ADMIN — HYDROCODONE BITARTRATE AND ACETAMINOPHEN 1 TABLET: 5; 325 TABLET ORAL at 10:21

## 2022-05-23 RX ADMIN — ENOXAPARIN SODIUM 40 MG: 100 INJECTION SUBCUTANEOUS at 08:40

## 2022-05-23 RX ADMIN — HYDROCODONE BITARTRATE AND ACETAMINOPHEN 1 TABLET: 5; 325 TABLET ORAL at 15:26

## 2022-05-23 RX ADMIN — CYANOCOBALAMIN TAB 1000 MCG 500 MCG: 1000 TAB at 08:40

## 2022-05-23 RX ADMIN — CLINDAMYCIN PHOSPHATE 600 MG: 600 INJECTION, SOLUTION INTRAVENOUS at 21:39

## 2022-05-23 RX ADMIN — BACLOFEN 20 MG: 10 TABLET ORAL at 14:10

## 2022-05-23 RX ADMIN — ASPIRIN 81 MG 81 MG: 81 TABLET ORAL at 08:40

## 2022-05-23 RX ADMIN — SODIUM CHLORIDE 40 ML: 9 INJECTION, SOLUTION INTRAVENOUS at 14:07

## 2022-05-23 RX ADMIN — FOLIC ACID 1 MG: 1 TABLET ORAL at 08:41

## 2022-05-23 RX ADMIN — BACLOFEN 20 MG: 10 TABLET ORAL at 06:03

## 2022-05-23 RX ADMIN — METOPROLOL SUCCINATE 12.5 MG: 25 TABLET, EXTENDED RELEASE ORAL at 08:40

## 2022-05-23 RX ADMIN — CLINDAMYCIN PHOSPHATE 600 MG: 600 INJECTION, SOLUTION INTRAVENOUS at 06:06

## 2022-05-23 RX ADMIN — SODIUM CHLORIDE, PRESERVATIVE FREE 10 ML: 5 INJECTION INTRAVENOUS at 21:40

## 2022-05-23 RX ADMIN — HYDROCODONE BITARTRATE AND ACETAMINOPHEN 2 TABLET: 5; 325 TABLET ORAL at 22:47

## 2022-05-23 RX ADMIN — HYDROCODONE BITARTRATE AND ACETAMINOPHEN 1 TABLET: 5; 325 TABLET ORAL at 04:33

## 2022-05-23 RX ADMIN — CHOLECALCIFEROL TAB 25 MCG (1000 UNIT) 1000 UNITS: 25 TAB at 08:41

## 2022-05-23 RX ADMIN — FUROSEMIDE 20 MG: 20 TABLET ORAL at 10:11

## 2022-05-23 RX ADMIN — OXYBUTYNIN CHLORIDE 5 MG: 5 TABLET, EXTENDED RELEASE ORAL at 08:57

## 2022-05-23 RX ADMIN — CLINDAMYCIN PHOSPHATE 600 MG: 600 INJECTION, SOLUTION INTRAVENOUS at 14:09

## 2022-05-23 RX ADMIN — SODIUM CHLORIDE: 9 INJECTION, SOLUTION INTRAVENOUS at 04:25

## 2022-05-23 ASSESSMENT — PAIN SCALES - GENERAL
PAINLEVEL_OUTOF10: 0
PAINLEVEL_OUTOF10: 5
PAINLEVEL_OUTOF10: 6
PAINLEVEL_OUTOF10: 6
PAINLEVEL_OUTOF10: 2
PAINLEVEL_OUTOF10: 7

## 2022-05-23 ASSESSMENT — PAIN - FUNCTIONAL ASSESSMENT
PAIN_FUNCTIONAL_ASSESSMENT: ACTIVITIES ARE NOT PREVENTED
PAIN_FUNCTIONAL_ASSESSMENT: ACTIVITIES ARE NOT PREVENTED

## 2022-05-23 ASSESSMENT — PAIN DESCRIPTION - ORIENTATION
ORIENTATION: LEFT
ORIENTATION: LEFT

## 2022-05-23 ASSESSMENT — PAIN DESCRIPTION - LOCATION
LOCATION: FACE
LOCATION: MOUTH

## 2022-05-23 ASSESSMENT — PAIN DESCRIPTION - DESCRIPTORS
DESCRIPTORS: ACHING
DESCRIPTORS: PRESSURE
DESCRIPTORS: ACHING

## 2022-05-23 NOTE — PROGRESS NOTES
Occupational Therapy  Facility/Department: [address] [place] [address]  Occupational Therapy Initial Assessment    Name: Lisseth Bryant  : 1940  MRN: [id number]  Date of Service: 2022    Discharge Recommendations:  Home with Home health OT       Patient Diagnosis(es): The primary encounter diagnosis was Facial cellulitis. Diagnoses of Dental infection and Elevated lactic acid level were also pertinent to this visit. Past Medical History:  has a past medical history of Hypertension, Osteoarthritis, Osteoporosis, and Primary lateral sclerosis ([facility] [address] Past Surgical History:  has a past surgical history that includes Appendectomy (1977); Tonsillectomy and adenoidectomy (); Hysterectomy, total abdominal (); and Cataract removal (Bilateral, ). Treatment Diagnosis: general weakness      Assessment   Performance deficits / Impairments: Decreased functional mobility ; Decreased ADL status; Decreased cognition;Decreased balance;Decreased coordination  Treatment Diagnosis: general weakness  Prognosis: Fair  Decision Making: Low Complexity           Plan   Plan  Times per Week: 1-2  Current Treatment Recommendations: Self-Care / ADL,Patient/Caregiver education & training     Subjective   General  Chart Reviewed: Yes  Patient assessed for rehabilitation services?: Yes  Family / Caregiver Present: Yes  Referring Practitioner: CHET [place]  Diagnosis: dental abcess  Subjective  Subjective: Patient rec'd in bed, pleasant and cooperative 81 yr old female with dental pain     Social/Functional History  Social/Functional History  Type of Home: Facility (Patient is a resident at [address] [place] [address])  Home Layout: One level  Home Access: Level entry  Bathroom Shower/Tub: Walk-in shower  Bathroom Toilet: Handicap height  Bathroom Equipment: Built-in shower seat,Grab bars in shower,Grab bars around toilet  Bathroom Accessibility: Narinder Winter: [address] Help From: Personal care attendant,Home health  ADL Assistance: Needs assistance  Bath: Maximum assistance  Dressing: Maximum assistance  Grooming: Maximum assistance  Toileting: Needs assistance  Homemaking Responsibilities: No  Ambulation Assistance: Non-ambulatory  Transfer Assistance: Needs assistance  Active : No  Occupation: Retired  IADL Comments: Patient unsure where she lives. States that she has not been walking for a long time. Objective   Pulse: 78  Heart Rate Source: Monitor  BP: (!) 134/53  BP Location: Left upper arm  BP Method: Automatic  Patient Position: Semi fowlers  MAP (Calculated): 80  Resp: 17  SpO2: 90 %  O2 Device: None (Room air)  Hearing: Within functional limits          Safety Devices  Type of Devices: Call light within reach; Left in bed;Bed alarm in place;Nurse notified           ADL  UE Dressing: Dependent/Total  LE Dressing: Dependent/Total  Toileting: Dependent/Total     Activity Tolerance  Activity Tolerance: Treatment limited secondary to decreased cognition;Treatment limited secondary to medical complications  Bed mobility  Rolling to Right: Minimal assistance  Supine to Sit: Moderate assistance  Sit to Supine:  Moderate assistance;Maximum assistance  Scooting: Maximal assistance  Transfers  Sit to stand: Unable to assess  Stand to sit: Unable to assess     LUE AROM (degrees)  LUE AROM : WFL  Left Hand AROM (degrees)  Left Hand AROM: WFL  RUE AROM (degrees)  RUE AROM : WFL  Right Hand AROM (degrees)  Right Hand AROM: WFL       Goals  Short Term Goals  Time Frame for Short term goals: duration of hospital stay  Short Term Goal 1: Patient to complete UB ADLs and grooming tasks with min a after set up, sitting EOB, using a/e as needed       Therapy Time   Individual Concurrent Group Co-treatment   Time In 1330         Time Out 1347         Minutes 17         Timed Code Treatment Minutes: 0 Minutes       BRIJESH FLANAGAN OTR, OT

## 2022-05-23 NOTE — PROGRESS NOTES
Physician Progress Note      Cristian Santacruz  CSN #:                  [id number]  :                       1940  ADMIT DATE:       2022 1:27 PM  [address] [place] DATE:  RESPONDING  PROVIDER #:        Fabrice TAMAYO          QUERY TEXT:    Pt admitted with dental abscess. Pt noted to have elevated WBC and LA. If   possible, please document in the progress notes and discharge summary if you   are evaluating and /or treating any of the following: The medical record reflects the following:  Risk Factors:  Clinical Indicators: WBC 12.9; LA 2.5-->2.1; Temp 99.0F; no noted tachycardia;   per H/P--> Left lower mouth at gum line red and swollen, dental abscess  Treatment: MIVF @ 100ml/hr, Clindamycin Q8H. LA level, monitoring, hospital   admission,  Options provided:  -- Sepsis, present on admission  -- Dental abscess without Sepsis  -- Other - I will add my own diagnosis  -- Disagree - Not applicable / Not valid  -- Disagree - Clinically unable to determine / Unknown  -- Refer to Clinical Documentation Reviewer    PROVIDER RESPONSE TEXT:    This patient has dental abscess without sepsis.     Query created by: Joyice Aschoff on 2022 4:09 PM      Electronically signed by:  Cassy Banuelos 2022 4:12 PM

## 2022-05-23 NOTE — PLAN OF CARE
Problem: Discharge Planning  Goal: Discharge to home or other facility with appropriate resources  Outcome: Progressing  Flowsheets (Taken 5/23/2022 0038 by Tressa Levine RN)  Discharge to home or other facility with appropriate resources:   Identify barriers to discharge with patient and caregiver   Identify discharge learning needs (meds, wound care, etc)   Arrange for needed discharge resources and transportation as appropriate   Refer to discharge planning if patient needs post-hospital services based on physician order or complex needs related to functional status, cognitive ability or social support system     Problem: Pain  Goal: Verbalizes/displays adequate comfort level or baseline comfort level  Outcome: Progressing     Problem: Skin/Tissue Integrity  Goal: Absence of new skin breakdown  Description: 1. Monitor for areas of redness and/or skin breakdown  2. Assess vascular access sites hourly  3. Every 4-6 hours minimum:  Change oxygen saturation probe site  4. Every 4-6 hours:  If on nasal continuous positive airway pressure, respiratory therapy assess nares and determine need for appliance change or resting period.   Outcome: Progressing     Problem: Safety - Adult  Goal: Free from fall injury  Outcome: Progressing     Problem: ABCDS Injury Assessment  Goal: Absence of physical injury  Outcome: Progressing

## 2022-05-23 NOTE — PROGRESS NOTES
Physical Therapy  Facility/Department: [facility]  Physical Therapy Initial Assessment    Name: Charisma Vegas  : 1940  MRN: [id number]  Date of Service: 2022    Discharge Recommendations:  [address] without PT          Patient Diagnosis(es): The primary encounter diagnosis was Facial cellulitis. Diagnoses of Dental infection and Elevated lactic acid level were also pertinent to this visit. Past Medical History:  has a past medical history of Hypertension, Osteoarthritis, Osteoporosis, and Primary lateral sclerosis ([facility] [address] Past Surgical History:  has a past surgical history that includes Appendectomy (1977); Tonsillectomy and adenoidectomy (); Hysterectomy, total abdominal (); and Cataract removal (Bilateral, ). Assessment   Body Structures, Functions, Activity Limitations Requiring Skilled Therapeutic Intervention: Decreased functional mobility ; Decreased cognition;Decreased strength  Therapy Prognosis: Poor  Activity Tolerance  Activity Tolerance: Treatment limited secondary to decreased cognition;Treatment limited secondary to medical complications     Plan   Safety Devices  Type of Devices: Call light within reach,Left in bed,Bed alarm in place     Restrictions        Subjective   General  Chart Reviewed: Yes  Patient assessed for rehabilitation services?: Yes  Response To Previous Treatment: Not applicable  Family / Caregiver Present: No  Follows Commands: Impaired  General Comment  Comments: Patient is a poor historian reguarding recent functional abilities. Social/Functional History  Social/Functional History  Type of Home: Facility  IADL Comments: Patient unsure where she lives. States that she has not been walking for a long time.   Vision/Hearing       Cognition   Orientation  Overall Orientation Status: Impaired     Objective   Pulse: 78  Heart Rate Source: Monitor  BP: (!) 134/53  BP Location: Left upper arm  BP Method: Automatic  Patient Position: Semi meet  MAP (Calculated): 80  Resp: 17  SpO2: 90 %  O2 Device: None (Room air)                 Strength RLE  Comment: Grossly 2/5  Strength LLE  Comment: Grossly 1/5           Bed mobility  Rolling to Left: Minimal assistance  Rolling to Right: Minimal assistance  Supine to Sit: Unable to assess  Sit to Supine: Unable to assess  Transfers  Sit to Stand: Unable to assess  Stand to sit: Unable to assess  Bed to Chair: Unable to assess  Ambulation  Assistance: Unable to assess                 OutComes Score                                                  AM-PAC Score             Goals  Short Term Goals  Short term goal 1: Functional ability assessed       Education         Therapy Time   Individual Concurrent Group Co-treatment   Time In  10:00         Time Out  10:09         Minutes  Narinder Rene PT

## 2022-05-23 NOTE — CARE COORDINATION
Case Management Initial Discharge Plan  Jaleel López             Met with:patient to discuss discharge plans. Information verified: address, contacts, phone number, , and insurance: Yes  Insurance Provider: Primary:  Payor: Juan Buchanan / Plan: Soo Infante PPO / Product Type: Medicare /                                         Emergency Contact/Next of Kin name & number: [place], see chart  Who are involved in patient's support system? family    PCP: Mick Slaughter, APRN - CNP  Date of last visit: see chart    Discharge Planning    Living Arrangements:  Other (Comment) (Nursing staff)     Home has 1 stories  0 stairs to climb to get into front door, 0stairs to climb to reach second floor  Location of bedroom/bathroom in home first    Patient able to perform ADL's:Assisted    Current Services (outpatient & in home) yes, assisted living  DME equipment: wheelchair  DME provider: [address] [place] [address]    Is patient receiving oral anticoagulation therapy? No    If indicated:   Physician managing anticoagulation treatment: n/a  Where does patient obtain lab work for ATC treatment? n/a      Potential Assistance Needed:  Zackary Blue    Patient agreeable to home care: Yes  Freedom of choice provided:  no, patient currently receiving home health services through [facility] at the [address] [place] [address]    Prior SNF/Rehab Placement and Facility: yes  Agreeable to SNF/Rehab: Yes  [place] of choice provided: n/a, Patient currently resides at the [address] [place] [address]       Expected Discharge date:       Patient expects to be discharged to: If home: is the family and/or caregiver wiling & able to provide support at home? yes  Who will be providing this support?  Family, [address] staff    Follow Up Appointment: Best Day/ Time: Monday AM    Transportation provider: family  Transportation arrangements needed for discharge: No    Readmission Risk              Risk of Unplanned Readmission:  8             Does patient have a readmission risk score greater than 20?: No  If yes, follow-up appointment must be made within 7 days of discharge. Goals of Care:       Educated Patient on transitional options, provided freedom of choice and are agreeable with plan      Discharge Plan: Pt currently resides at the [address] [place] [address] and receives therapy services through [facility] services. Pt will continue with this plan upon discharge. No other questions/concerns voiced at present time.           Electronically signed by Lopez Pickard RN on 5/23/22 at 10:21 AM EDT

## 2022-05-23 NOTE — H&P
HOSPITALIST ADMISSION H&P      REASON FOR ADMISSION:  Dental abscess  ESTIMATED LENGTH OF STAY: > 2 midnights, 2-3 days    ATTENDING/ADMITTING PHYSICIAN: Niles Dao MD  PCP: HIEU Dahl CNP    HISTORY OF PRESENT ILLNESS:      The patient is a 81 y.o. female patient of HIEU Dahl CNP who presents from ER with c/o Mouth pain and swelling. Patient unable to tell writer when pain started, just that today there was increased pain and swelling to Left lower mouth. Per ER note: family noticed swelling increase while patient was at [facility]    Hypertension: 157/71    Wounds and LDAs present prior to admission: Left lower mouth at gum line red and swollen. See below for additional PMH. In ER: Clindamycin, Toradol. Patient [id number] records reviewed, including, but not limited to ER records, imaging results, lab results, office records, personal records, and OARRS -- no signs of abuse or diversion.      Past Medical History:   Diagnosis Date    Hypertension     Osteoarthritis     Osteoporosis     Primary lateral sclerosis ([facility] [address]            Past Surgical History:   Procedure Laterality Date    APPENDECTOMY  11/1977    CATARACT REMOVAL Bilateral 2018    HYSTERECTOMY, TOTAL ABDOMINAL  1977    ovaries remain    TONSILLECTOMY AND ADENOIDECTOMY  1946       Medications Prior to Admission:    Medications Prior to Admission: metoprolol succinate (TOPROL XL) 25 MG extended release tablet, Take 0.5 tablets by mouth daily  Control Gel Formula Dressing (DUODERM CGF DRESSING EX), To right buttock (protection), change q3d and prn (Patient not taking: Reported on 5/22/2022)  Acetaminophen 325 MG CAPS, Take 650 mg by mouth every 6 hours as needed (pain)  vitamin D 25 MCG (1000 UT) CAPS, Take 1,000 Units by mouth daily  aspirin 81 MG chewable tablet, Take 81 mg by mouth daily  oxybutynin (DITROPAN-XL) 5 MG extended release tablet, Take 5 mg by mouth daily  furosemide (LASIX) 20 MG tablet, Take 1 tablet by mouth daily  baclofen (LIORESAL) 20 MG tablet, Take 20 mg by mouth every 8 hours   folic acid (FOLVITE) 1 MG tablet, Take 1 mg by mouth daily  vitamin B-12 (CYANOCOBALAMIN) 500 MCG tablet, Take 500 mcg by mouth daily. Allergies:    Patient has no known allergies. Social History:    reports that she has never smoked. She has never used smokeless tobacco. She reports that she does not drink alcohol and does not use drugs. Family History:   family history includes Breast Cancer in her maternal aunt; Cancer in her brother; Coronary Art Dis in her father; Heart Disease in her mother; Migraines in her father and mother; Other in her father. REVIEW OF SYSTEMS:  See HPI and problem list; otherwise no other new complaints with respect to eyes, ENT, neck, pulmonary, coronary, chest, GI, , endocrine, musculoskeletal, immune system/connective tissue disease, hematologic, neurologic, psychiatric, skin, lymphatics, or malignancies. Code status: patient/family wishes for Full Code at this time. PHYSICAL EXAM:  Vitals:  BP (!) 157/71   Pulse 73   Temp 98.6 °F (37 °C) (Tympanic)   Resp 18   Ht 5' 6\" (1.676 m)   Wt 165 lb 8 oz (75.1 kg)   SpO2 93%   BMI 26.71 kg/m²     General: awake, alert, cooperative, well nourished and well groomed  HEENT: Gumline left lower mouth red/swollen, dental caries with worn teeth at level of gums. , PERRLA, Mucosa Pink, Moist, EMOI, External nose normal, Normocephalic, Atraumatic and Neck with full ROM  Neck: Supple, Carotid Pulses Present, No Bruits, No Masses, Tenderness, Nodularity and No Lymphadenopathy  Chest/Lungs: Clear to Auscultation without Rales, Rhonchi, or Wheezes and Respirations even and unlabored  Cardiac: Regular Rate and Rhythm and Pedal Pulses Palpable Bilaterally  GI/Abdomen:  Bowel Sounds Present, Soft, Non-tender, without Guarding or Rebound Tenderness, No Masses and No Tenderness  : Not Lovenox,   Home medications reviewed  See orders     Note that over 50 minutes was spent in evaluation of the patient, review of the chart and pertinent records, discussion with family/staff, etc    HIEU Landeros NP    3:37 AM  5/23/2022

## 2022-05-24 LAB
ABSOLUTE EOS #: 0.29 K/UL (ref 0–0.44)
ABSOLUTE IMMATURE GRANULOCYTE: 0.04 K/UL (ref 0–0.3)
ABSOLUTE LYMPH #: 1.56 K/UL (ref 1.1–3.7)
ABSOLUTE MONO #: 0.6 K/UL (ref 0.1–1.2)
ANION GAP SERPL CALCULATED.3IONS-SCNC: 7 MMOL/L (ref 9–17)
BASOPHILS # BLD: 0 % (ref 0–2)
BASOPHILS ABSOLUTE: <0.03 K/UL (ref 0–0.2)
BUN BLDV-MCNC: 11 MG/DL (ref 8–23)
BUN/CREAT BLD: 18 (ref 9–20)
CALCIUM SERPL-MCNC: 8.9 MG/DL (ref 8.6–10.4)
CHLORIDE BLD-SCNC: 102 MMOL/L (ref 98–107)
CO2: 27 MMOL/L (ref 20–31)
CREAT SERPL-MCNC: 0.61 MG/DL (ref 0.5–0.9)
EOSINOPHILS RELATIVE PERCENT: 4 % (ref 1–4)
GFR AFRICAN AMERICAN: >60 ML/MIN
GFR NON-AFRICAN AMERICAN: >60 ML/MIN
GFR SERPL CREATININE-BSD FRML MDRD: ABNORMAL ML/MIN/{1.73_M2}
GLUCOSE BLD-MCNC: 109 MG/DL (ref 70–99)
HCT VFR BLD CALC: 42 % (ref 36.3–47.1)
HEMOGLOBIN: 14 G/DL (ref 11.9–15.1)
IMMATURE GRANULOCYTES: 1 %
LYMPHOCYTES # BLD: 19 % (ref 24–43)
MCH RBC QN AUTO: 31 PG (ref 25.2–33.5)
MCHC RBC AUTO-ENTMCNC: 33.3 G/DL (ref 25.2–33.5)
MCV RBC AUTO: 93.1 FL (ref 82.6–102.9)
MONOCYTES # BLD: 8 % (ref 3–12)
NRBC AUTOMATED: 0 PER 100 WBC
PDW BLD-RTO: 13.2 % (ref 11.8–14.4)
PLATELET # BLD: 273 K/UL (ref 138–453)
PMV BLD AUTO: 10.4 FL (ref 8.1–13.5)
POTASSIUM SERPL-SCNC: 4 MMOL/L (ref 3.7–5.3)
RBC # BLD: 4.51 M/UL (ref 3.95–5.11)
SEG NEUTROPHILS: 68 % (ref 36–65)
SEGMENTED NEUTROPHILS ABSOLUTE COUNT: 5.54 K/UL (ref 1.5–8.1)
SODIUM BLD-SCNC: 136 MMOL/L (ref 135–144)
WBC # BLD: 8.1 K/UL (ref 3.5–11.3)

## 2022-05-24 PROCEDURE — 2580000003 HC RX 258: Performed by: INTERNAL MEDICINE

## 2022-05-24 PROCEDURE — 99232 SBSQ HOSP IP/OBS MODERATE 35: CPT | Performed by: INTERNAL MEDICINE

## 2022-05-24 PROCEDURE — 85025 COMPLETE CBC W/AUTO DIFF WBC: CPT

## 2022-05-24 PROCEDURE — 36415 COLL VENOUS BLD VENIPUNCTURE: CPT

## 2022-05-24 PROCEDURE — 6370000000 HC RX 637 (ALT 250 FOR IP): Performed by: INTERNAL MEDICINE

## 2022-05-24 PROCEDURE — 2060000000 HC ICU INTERMEDIATE R&B

## 2022-05-24 PROCEDURE — 6370000000 HC RX 637 (ALT 250 FOR IP): Performed by: FAMILY MEDICINE

## 2022-05-24 PROCEDURE — 2500000003 HC RX 250 WO HCPCS: Performed by: FAMILY MEDICINE

## 2022-05-24 PROCEDURE — 80048 BASIC METABOLIC PNL TOTAL CA: CPT

## 2022-05-24 PROCEDURE — 6360000002 HC RX W HCPCS: Performed by: FAMILY MEDICINE

## 2022-05-24 PROCEDURE — 97535 SELF CARE MNGMENT TRAINING: CPT

## 2022-05-24 RX ORDER — LACTOBACILLUS RHAMNOSUS GG 10B CELL
1 CAPSULE ORAL DAILY
Status: DISCONTINUED | OUTPATIENT
Start: 2022-05-24 | End: 2022-05-25 | Stop reason: HOSPADM

## 2022-05-24 RX ADMIN — ENOXAPARIN SODIUM 40 MG: 100 INJECTION SUBCUTANEOUS at 08:10

## 2022-05-24 RX ADMIN — BACLOFEN 20 MG: 10 TABLET ORAL at 21:21

## 2022-05-24 RX ADMIN — Medication 1 CAPSULE: at 16:37

## 2022-05-24 RX ADMIN — BACLOFEN 20 MG: 10 TABLET ORAL at 14:10

## 2022-05-24 RX ADMIN — ASPIRIN 81 MG 81 MG: 81 TABLET ORAL at 08:10

## 2022-05-24 RX ADMIN — BACLOFEN 20 MG: 10 TABLET ORAL at 06:37

## 2022-05-24 RX ADMIN — SODIUM CHLORIDE, PRESERVATIVE FREE 10 ML: 5 INJECTION INTRAVENOUS at 08:11

## 2022-05-24 RX ADMIN — HYDROCODONE BITARTRATE AND ACETAMINOPHEN 1 TABLET: 5; 325 TABLET ORAL at 15:07

## 2022-05-24 RX ADMIN — CHOLECALCIFEROL TAB 25 MCG (1000 UNIT) 1000 UNITS: 25 TAB at 08:09

## 2022-05-24 RX ADMIN — HYDROCODONE BITARTRATE AND ACETAMINOPHEN 1 TABLET: 5; 325 TABLET ORAL at 21:21

## 2022-05-24 RX ADMIN — METOPROLOL SUCCINATE 12.5 MG: 25 TABLET, EXTENDED RELEASE ORAL at 08:10

## 2022-05-24 RX ADMIN — FUROSEMIDE 20 MG: 20 TABLET ORAL at 08:09

## 2022-05-24 RX ADMIN — CLINDAMYCIN PHOSPHATE 600 MG: 600 INJECTION, SOLUTION INTRAVENOUS at 14:14

## 2022-05-24 RX ADMIN — HYDROCODONE BITARTRATE AND ACETAMINOPHEN 1 TABLET: 5; 325 TABLET ORAL at 08:09

## 2022-05-24 RX ADMIN — FOLIC ACID 1 MG: 1 TABLET ORAL at 08:09

## 2022-05-24 RX ADMIN — CLINDAMYCIN PHOSPHATE 600 MG: 600 INJECTION, SOLUTION INTRAVENOUS at 06:37

## 2022-05-24 RX ADMIN — CLINDAMYCIN PHOSPHATE 600 MG: 600 INJECTION, SOLUTION INTRAVENOUS at 21:22

## 2022-05-24 RX ADMIN — SODIUM CHLORIDE, PRESERVATIVE FREE 10 ML: 5 INJECTION INTRAVENOUS at 21:25

## 2022-05-24 RX ADMIN — CYANOCOBALAMIN TAB 1000 MCG 500 MCG: 1000 TAB at 08:10

## 2022-05-24 RX ADMIN — OXYBUTYNIN CHLORIDE 5 MG: 5 TABLET, EXTENDED RELEASE ORAL at 08:10

## 2022-05-24 ASSESSMENT — PAIN SCALES - GENERAL
PAINLEVEL_OUTOF10: 2
PAINLEVEL_OUTOF10: 6
PAINLEVEL_OUTOF10: 6

## 2022-05-24 ASSESSMENT — PAIN DESCRIPTION - LOCATION
LOCATION: FACE
LOCATION: TEETH

## 2022-05-24 ASSESSMENT — PAIN DESCRIPTION - DESCRIPTORS
DESCRIPTORS: SHARP
DESCRIPTORS: ACHING

## 2022-05-24 ASSESSMENT — PAIN DESCRIPTION - ORIENTATION
ORIENTATION: LEFT
ORIENTATION: LEFT

## 2022-05-24 NOTE — PROGRESS NOTES
Occupational Therapy  Facility/Department: [facility]  PROGRESSIVE CARE  Daily Treatment Note  NAME: Vandana Barragan  : 1940  MRN: [id number]    Date of Service: 2022    Discharge Recommendations:  Home with Home health OT     Patient Diagnosis(es): The primary encounter diagnosis was Facial cellulitis. Diagnoses of Dental infection and Elevated lactic acid level were also pertinent to this visit. Assessment    Activity Tolerance: Treatment limited secondary to decreased cognition;Treatment limited secondary to medical complications  Discharge Recommendations: Home with [facility]  Times per Week: 1-2  Current Treatment Recommendations: Self-Care / ADL; Patient/Caregiver education & training     Subjective   Subjective  Subjective: Pt rec'd sitting up in bed, pleasant and cooperative for OT. Pain: Pt denies pain this date. Objective    ADL  Grooming: Minimal assistance  Grooming Skilled Clinical Factors: Pt completed grooming tasks of washing her face, combing her hair, and brushing her teeth while seated EOB with Min/Mod A for trunk support. Pt continuously demo'd lateral left lean throughout session and required assistance to correct posture. Pt tolerated sitting EOB ~5-10 minutes with support. Safety Devices  Type of Devices: Call light within reach; Left in bed;Bed alarm in place; All fall risk precautions in place   Patient Education  Education Given To: Patient  Education Provided: Transfer Training  Education Method: Verbal  Barriers to Learning: None  Education Outcome: Verbalized understanding    Goals  Short Term Goals  Time Frame for Short term goals: duration of hospital stay  Short Term Goal 1: Patient to complete UB ADLs and grooming tasks with min a after set up, sitting EOB, using a/e as needed       Therapy Time   Individual Concurrent Group Co-treatment   Time In 1330         Time Out 1355         Minutes 25         Timed Code Treatment Minutes: 25 Minutes Shawn Robledo, OTD, OTR/L

## 2022-05-24 NOTE — PROGRESS NOTES
Hospitalist Progress Note    Patient:  Ashutosh Ford     YOB: 1940    MRN: [id number]   Admit date: 5/22/2022     Acct: [de-identified]     PCP: HIEU Christensen CNP    CC--Interval History:   Left lower dental abscess---multiple broken teeth---on Clindamycin IV----improved    BLE---improved    Primary lateral sclerosis---wheel chair bound----states that she is not able to ambulate in any manner whatsoever    See note below     All other ROS negative except noted in HPI    Diet:  ADULT DIET;  Regular    Medications:  Scheduled Meds:   furosemide  20 mg Oral Daily    sodium chloride flush  5-40 mL IntraVENous 2 times per day    aspirin  81 mg Oral Daily    baclofen  20 mg Oral T4U    folic acid  1 mg Oral Daily    metoprolol succinate  12.5 mg Oral Daily    oxybutynin  5 mg Oral Daily    vitamin B-12  500 mcg Oral Daily    Vitamin D  1,000 Units Oral Daily    clindamycin (CLEOCIN) IV  600 mg IntraVENous Q8H    enoxaparin  40 mg SubCUTAneous Daily     Continuous Infusions:   sodium chloride Stopped (05/23/22 1515)     PRN Meds:sodium chloride flush, sodium chloride, HYDROcodone 5 mg - acetaminophen **OR** HYDROcodone 5 mg - acetaminophen    Objective:  Labs:  CBC with Differential:    Lab Results   Component Value Date    WBC 8.1 05/24/2022    RBC 4.51 05/24/2022    RBC 4.88 10/12/2020    HGB 14.0 05/24/2022    HCT 42.0 05/24/2022     05/24/2022    MCV 93.1 05/24/2022    MCH 31.0 05/24/2022    MCHC 33.3 05/24/2022    RDW 13.2 05/24/2022    LYMPHOPCT 19 05/24/2022    LYMPHOPCT 26.78 10/12/2020    MONOPCT 8 05/24/2022    BASOPCT 0 05/24/2022    MONOSABS 0.60 05/24/2022    MONOSABS 0.5319 10/12/2020    LYMPHSABS 1.56 05/24/2022    LYMPHSABS 1.845 10/12/2020    EOSABS 0.29 05/24/2022    EOSABS 0.2715 10/12/2020    BASOSABS <0.03 05/24/2022    DIFFTYPE NOT REPORTED 01/12/2022     BMP:    Lab Results   Component Value Date     05/24/2022    K 4.0 05/24/2022     05/24/2022 CO2 27 05/24/2022    BUN 11 05/24/2022    LABALBU 3.4 10/12/2020    CREATININE 0.61 05/24/2022    CALCIUM 8.9 05/24/2022    GFRAA >60 05/24/2022    LABGLOM >60 05/24/2022    GLUCOSE 109 05/24/2022    GLUCOSE 91 10/12/2020           Physical Exam:  Vitals: /85   Pulse 73   Temp 97.1 °F (36.2 °C) (Tympanic)   Resp 20   Ht 5' 6\" (1.676 m)   Wt 170 lb 14.4 oz (77.5 kg)   SpO2 94%   BMI 27.58 kg/m²   24 hour intake/output:    Intake/Output Summary (Last 24 hours) at 5/24/2022 1133  Last data filed at 5/24/2022 0352  Gross per 24 hour   Intake 737.79 ml   Output 1100 ml   Net -362.21 ml     Last 3 weights: Wt Readings from Last 3 Encounters:   05/24/22 170 lb 14.4 oz (77.5 kg)   12/30/21 162 lb (73.5 kg)   12/16/21 162 lb (73.5 kg)     HEENT: Normocephalic and Atraumatic  Neck: Supple, No Masses, Tenderness, Nodularity and No Lymphadenopathy  Chest/Lungs: Clear to Auscultation without Rales, Rhonchi, or Wheezes  Cardiac: Regular Rate and Rhythm  GI/Abdomen: Bowel Sounds Present and Soft, Non-tender, without Guarding or Rebound Tenderness  : Not examined  EXT/Skin: No Cyanosis, No Clubbing and Edema Present  Neuro: alert---non-ambulatory--dementia = baseline       Assessment:    Principal Problem:    Dental abscess  Active Problems:    Facial cellulitis  Resolved Problems:    * No resolved hospital problems.  NOEMY Adhikari  82  WF   Custer Phalen, NP---FP---[facility]  FULL CODE     Muriel [place]   COVID19NEGATIVE---5.22.2022    Anti-infectives:  Clindamycin IV    Dental abscess----left  lower--5.22.2022  BLE edema   Right buttock---Stage 2 ulceration---POA---Mepilex   Hypertension   CKD---Stage 2 or better  Primary lateral sclerosisUMN disease---wheel chair bound----non-ambulatory   Dementia   PMH:    OA, osteoporosis, urinary incontinence, Vitamin D deficiency, PACs  PSH:    appendectomy--1977, cataract---IOL---OU2018, SOWMYA----no BSO---1946    Allergies:     NKDA          Plan:  1.   cont'd IV antibiotics  2. Probiotics  3.    See orders     Electronically signed by Jane Hui on 5/24/2022 at 11:33 AM    Hospitalist

## 2022-05-25 VITALS
OXYGEN SATURATION: 94 % | BODY MASS INDEX: 27.35 KG/M2 | SYSTOLIC BLOOD PRESSURE: 131 MMHG | HEART RATE: 64 BPM | RESPIRATION RATE: 17 BRPM | WEIGHT: 170.2 LBS | TEMPERATURE: 97.9 F | HEIGHT: 66 IN | DIASTOLIC BLOOD PRESSURE: 61 MMHG

## 2022-05-25 LAB
ABSOLUTE EOS #: 0.32 K/UL (ref 0–0.44)
ABSOLUTE IMMATURE GRANULOCYTE: 0.03 K/UL (ref 0–0.3)
ABSOLUTE LYMPH #: 1.24 K/UL (ref 1.1–3.7)
ABSOLUTE MONO #: 0.47 K/UL (ref 0.1–1.2)
ANION GAP SERPL CALCULATED.3IONS-SCNC: 10 MMOL/L (ref 9–17)
BASOPHILS # BLD: 0 % (ref 0–2)
BASOPHILS ABSOLUTE: <0.03 K/UL (ref 0–0.2)
BUN BLDV-MCNC: 15 MG/DL (ref 8–23)
BUN/CREAT BLD: 25 (ref 9–20)
CALCIUM SERPL-MCNC: 8.4 MG/DL (ref 8.6–10.4)
CHLORIDE BLD-SCNC: 103 MMOL/L (ref 98–107)
CO2: 25 MMOL/L (ref 20–31)
CREAT SERPL-MCNC: 0.59 MG/DL (ref 0.5–0.9)
EOSINOPHILS RELATIVE PERCENT: 5 % (ref 1–4)
GFR AFRICAN AMERICAN: >60 ML/MIN
GFR NON-AFRICAN AMERICAN: >60 ML/MIN
GFR SERPL CREATININE-BSD FRML MDRD: ABNORMAL ML/MIN/{1.73_M2}
GLUCOSE BLD-MCNC: 97 MG/DL (ref 70–99)
HCT VFR BLD CALC: 41 % (ref 36.3–47.1)
HEMOGLOBIN: 13.8 G/DL (ref 11.9–15.1)
IMMATURE GRANULOCYTES: 1 %
LYMPHOCYTES # BLD: 19 % (ref 24–43)
MCH RBC QN AUTO: 30.9 PG (ref 25.2–33.5)
MCHC RBC AUTO-ENTMCNC: 33.7 G/DL (ref 25.2–33.5)
MCV RBC AUTO: 91.9 FL (ref 82.6–102.9)
MONOCYTES # BLD: 7 % (ref 3–12)
NRBC AUTOMATED: 0 PER 100 WBC
PDW BLD-RTO: 13.1 % (ref 11.8–14.4)
PLATELET # BLD: 291 K/UL (ref 138–453)
PMV BLD AUTO: 10.8 FL (ref 8.1–13.5)
POTASSIUM SERPL-SCNC: 3.8 MMOL/L (ref 3.7–5.3)
RBC # BLD: 4.46 M/UL (ref 3.95–5.11)
SEG NEUTROPHILS: 68 % (ref 36–65)
SEGMENTED NEUTROPHILS ABSOLUTE COUNT: 4.37 K/UL (ref 1.5–8.1)
SODIUM BLD-SCNC: 138 MMOL/L (ref 135–144)
WBC # BLD: 6.5 K/UL (ref 3.5–11.3)

## 2022-05-25 PROCEDURE — 2580000003 HC RX 258: Performed by: INTERNAL MEDICINE

## 2022-05-25 PROCEDURE — 80048 BASIC METABOLIC PNL TOTAL CA: CPT

## 2022-05-25 PROCEDURE — 36415 COLL VENOUS BLD VENIPUNCTURE: CPT

## 2022-05-25 PROCEDURE — 2500000003 HC RX 250 WO HCPCS: Performed by: FAMILY MEDICINE

## 2022-05-25 PROCEDURE — 6360000002 HC RX W HCPCS: Performed by: FAMILY MEDICINE

## 2022-05-25 PROCEDURE — 85025 COMPLETE CBC W/AUTO DIFF WBC: CPT

## 2022-05-25 PROCEDURE — 99238 HOSP IP/OBS DSCHRG MGMT 30/<: CPT | Performed by: INTERNAL MEDICINE

## 2022-05-25 PROCEDURE — 6370000000 HC RX 637 (ALT 250 FOR IP): Performed by: INTERNAL MEDICINE

## 2022-05-25 PROCEDURE — 6370000000 HC RX 637 (ALT 250 FOR IP): Performed by: FAMILY MEDICINE

## 2022-05-25 RX ORDER — GREEN TEA/HOODIA GORDONII 315-12.5MG
1 CAPSULE ORAL 3 TIMES DAILY
Qty: 30 TABLET | Refills: 0 | Status: SHIPPED | OUTPATIENT
Start: 2022-05-25 | End: 2022-06-02

## 2022-05-25 RX ORDER — CLINDAMYCIN HYDROCHLORIDE 300 MG/1
300 CAPSULE ORAL 2 TIMES DAILY
Qty: 10 CAPSULE | Refills: 0 | Status: SHIPPED | OUTPATIENT
Start: 2022-05-25 | End: 2022-05-30

## 2022-05-25 RX ORDER — HYDROCODONE BITARTRATE AND ACETAMINOPHEN 5; 325 MG/1; MG/1
1 TABLET ORAL EVERY 4 HOURS PRN
Qty: 18 TABLET | Refills: 0 | Status: SHIPPED | OUTPATIENT
Start: 2022-05-25 | End: 2022-05-28

## 2022-05-25 RX ADMIN — SODIUM CHLORIDE, PRESERVATIVE FREE 10 ML: 5 INJECTION INTRAVENOUS at 07:52

## 2022-05-25 RX ADMIN — OXYBUTYNIN CHLORIDE 5 MG: 5 TABLET, EXTENDED RELEASE ORAL at 07:51

## 2022-05-25 RX ADMIN — METOPROLOL SUCCINATE 12.5 MG: 25 TABLET, EXTENDED RELEASE ORAL at 07:51

## 2022-05-25 RX ADMIN — CLINDAMYCIN PHOSPHATE 600 MG: 600 INJECTION, SOLUTION INTRAVENOUS at 05:55

## 2022-05-25 RX ADMIN — FOLIC ACID 1 MG: 1 TABLET ORAL at 07:51

## 2022-05-25 RX ADMIN — CHOLECALCIFEROL TAB 25 MCG (1000 UNIT) 1000 UNITS: 25 TAB at 07:51

## 2022-05-25 RX ADMIN — Medication 1 CAPSULE: at 07:51

## 2022-05-25 RX ADMIN — CLINDAMYCIN PHOSPHATE 600 MG: 600 INJECTION, SOLUTION INTRAVENOUS at 14:00

## 2022-05-25 RX ADMIN — BACLOFEN 20 MG: 10 TABLET ORAL at 05:53

## 2022-05-25 RX ADMIN — FUROSEMIDE 20 MG: 20 TABLET ORAL at 07:51

## 2022-05-25 RX ADMIN — BACLOFEN 20 MG: 10 TABLET ORAL at 14:00

## 2022-05-25 RX ADMIN — HYDROCODONE BITARTRATE AND ACETAMINOPHEN 1 TABLET: 5; 325 TABLET ORAL at 09:50

## 2022-05-25 RX ADMIN — ENOXAPARIN SODIUM 40 MG: 100 INJECTION SUBCUTANEOUS at 07:51

## 2022-05-25 RX ADMIN — CYANOCOBALAMIN TAB 1000 MCG 500 MCG: 1000 TAB at 07:51

## 2022-05-25 RX ADMIN — ASPIRIN 81 MG 81 MG: 81 TABLET ORAL at 07:51

## 2022-05-25 ASSESSMENT — PAIN SCALES - GENERAL
PAINLEVEL_OUTOF10: 6
PAINLEVEL_OUTOF10: 2

## 2022-05-25 NOTE — PROGRESS NOTES
Hospitalist Progress Note    Patient:  Shukri Kathleen     YOB: 1940    MRN: [id number]   Admit date: 5/22/2022     Acct: [de-identified]     PCP: HIEU Steen - CNP    CC--Interval History: left lower dental abscess--improved--less swelling and discomfort---home--5.25.2022--Clindamycin and probiotics    HTN----142/64    Right buttock ulceration---Mepilex    Primary lateral sclerosis---wheelchair bound    See note below     All other ROS negative except noted in HPI    Diet:  ADULT DIET;  Regular    Medications:  Scheduled Meds:   lactobacillus  1 capsule Oral Daily    furosemide  20 mg Oral Daily    sodium chloride flush  5-40 mL IntraVENous 2 times per day    aspirin  81 mg Oral Daily    baclofen  20 mg Oral T6T    folic acid  1 mg Oral Daily    metoprolol succinate  12.5 mg Oral Daily    oxybutynin  5 mg Oral Daily    vitamin B-12  500 mcg Oral Daily    Vitamin D  1,000 Units Oral Daily    clindamycin (CLEOCIN) IV  600 mg IntraVENous Q8H    enoxaparin  40 mg SubCUTAneous Daily     Continuous Infusions:   sodium chloride Stopped (05/23/22 1515)     PRN Meds:sodium chloride flush, sodium chloride, HYDROcodone 5 mg - acetaminophen **OR** HYDROcodone 5 mg - acetaminophen    Objective:  Labs:  CBC with Differential:    Lab Results   Component Value Date    WBC 6.5 05/25/2022    RBC 4.46 05/25/2022    RBC 4.88 10/12/2020    HGB 13.8 05/25/2022    HCT 41.0 05/25/2022     05/25/2022    MCV 91.9 05/25/2022    MCH 30.9 05/25/2022    MCHC 33.7 05/25/2022    RDW 13.1 05/25/2022    LYMPHOPCT 19 05/25/2022    LYMPHOPCT 26.78 10/12/2020    MONOPCT 7 05/25/2022    BASOPCT 0 05/25/2022    MONOSABS 0.47 05/25/2022    MONOSABS 0.5319 10/12/2020    LYMPHSABS 1.24 05/25/2022    LYMPHSABS 1.845 10/12/2020    EOSABS 0.32 05/25/2022    EOSABS 0.2715 10/12/2020    BASOSABS <0.03 05/25/2022    DIFFTYPE NOT REPORTED 01/12/2022     BMP:    Lab Results   Component Value Date     05/25/2022    K 3.8 05/25/2022     05/25/2022    CO2 25 05/25/2022    BUN 15 05/25/2022    LABALBU 3.4 10/12/2020    CREATININE 0.59 05/25/2022    CALCIUM 8.4 05/25/2022    GFRAA >60 05/25/2022    LABGLOM >60 05/25/2022    GLUCOSE 97 05/25/2022    GLUCOSE 91 10/12/2020           Physical Exam:  Vitals: BP (!) 142/64   Pulse 68   Temp 96.9 °F (36.1 °C) (Tympanic)   Resp 14   Ht 5' 6\" (1.676 m)   Wt 170 lb 3.2 oz (77.2 kg)   SpO2 93%   BMI 27.47 kg/m²   24 hour intake/output:    Intake/Output Summary (Last 24 hours) at 5/25/2022 0810  Last data filed at 5/25/2022 0450  Gross per 24 hour   Intake 320 ml   Output 500 ml   Net -180 ml     Last 3 weights: Wt Readings from Last 3 Encounters:   05/25/22 170 lb 3.2 oz (77.2 kg)   12/30/21 162 lb (73.5 kg)   12/16/21 162 lb (73.5 kg)     HEENT: Normocephalic and Atraumatic---left lower jaw decreased swelling tenderness  Neck: Supple, No Masses, Tenderness, Nodularity and No Lymphadenopathy  Chest/Lungs: Clear to Auscultation without Rales, Rhonchi, or Wheezes  Cardiac: Regular Rate and Rhythm  GI/Abdomen: Bowel Sounds Present and Soft, Non-tender, without Guarding or Rebound Tenderness  : Not examined  EXT/Skin: No Cyanosis, No Clubbing and Edema Present---mild  Neuro: alert----dementia = baseline----BLE paraplegia       Assessment:    Principal Problem:    Dental abscess  Active Problems:    Facial cellulitis  Resolved Problems:    * No resolved hospital problems.  NOEMY Galvan  82  WF   Thomas Lorenzo NP---FP---[facility]  FULL CODE     Mati Reveal   COVID19NEGATIVE---5.22.2022    Anti-infectives:  Clindamycin IV    Dental abscess----left  lower--5.22.2022  BLE edema   Right buttock---Stage 2 ulceration---POA---Mepilex   Hypertension   CKD---Stage 2 or better  Primary lateral sclerosisUMN disease---wheel chair bound----non-ambulatory   Dementia   PMH:    OA, osteoporosis, urinary incontinence, Vitamin D deficiency, PACs  PSH:    appendectomy--1977, cataract---IOL---OU2018, SOWMYA----no BSO---1946    Allergies:     NKDA          Plan:  1. Home-----5.25.2022  2. Medications reviewed  3. Clindamycin  4. Probiotics  5. Norco for pain--limited Rx  6. Needs follow up appointment with dentist and/or oral surgeon  7.    See orders     Electronically signed by Rosy Allen on 5/25/2022 at 8:10 AM    Hospitalist

## 2022-05-25 NOTE — PROGRESS NOTES
CLINICAL PHARMACY NOTE: MEDS TO BEDS    Total # of Prescriptions Filled: 3   The following medications were delivered to the patient:  · Hydrocodone/Acetaminophen 5/325  · Acidophilus/Pectin  · Clindamycin 300mg    Additional Documentation:

## 2022-05-25 NOTE — DISCHARGE INSTR - DIET

## 2022-05-25 NOTE — PLAN OF CARE
Problem: Discharge Planning  Goal: Discharge to home or other facility with appropriate resources  5/25/2022 0932 by Edison Bower RN  Outcome: Progressing  5/25/2022 0555 by Isaias Snider RN  Outcome: Progressing     Problem: Pain  Goal: Verbalizes/displays adequate comfort level or baseline comfort level  5/25/2022 0932 by Edison Bower RN  Outcome: Progressing  5/25/2022 0555 by Isaias Snider RN  Outcome: Progressing     Problem: Skin/Tissue Integrity  Goal: Absence of new skin breakdown  Description: 1. Monitor for areas of redness and/or skin breakdown  2. Assess vascular access sites hourly  3. Every 4-6 hours minimum:  Change oxygen saturation probe site  4. Every 4-6 hours:  If on nasal continuous positive airway pressure, respiratory therapy assess nares and determine need for appliance change or resting period.   5/25/2022 0932 by Edison Bower RN  Outcome: Progressing  5/25/2022 0555 by Isaias Snider RN  Outcome: Progressing     Problem: Safety - Adult  Goal: Free from fall injury  5/25/2022 0932 by Edison Bower RN  Outcome: Progressing  5/25/2022 0555 by Isaias Snider RN  Outcome: Progressing     Problem: ABCDS Injury Assessment  Goal: Absence of physical injury  5/25/2022 0932 by Edison Bower RN  Outcome: Progressing  5/25/2022 0555 by Isaias Snider RN  Outcome: Progressing

## 2022-05-25 NOTE — FLOWSHEET NOTE
05/25/22 1455   Encounter Summary   Service Provided For: Patient and family together   Referral/Consult From: Rounding   Last Encounter  05/25/22   Complexity of Encounter Low   Begin Time 1440   End Time  1457   Total Time Calculated 17 min   Encounter    Type Follow up   Spiritual/Emotional needs   Type Spiritual Support   Assessment/Intervention/Outcome   Assessment Calm   Intervention Active listening;Sustaining Presence/Ministry of presence   Outcome Engaged in conversation;Expressed Gratitude

## 2022-05-25 NOTE — PROGRESS NOTES
rounding on PCU    Assessment: Patient is sitting up in bed visiting with er son. She resides at an assisted living facility and has strong family support. Her  has visited. Intervention: Engaged in conversation. Patient expressed appreciation for visit and offer of continued prayer. Plan: Chaplains are available on site or on call 24/7 for spiritual and emotional support.

## 2022-05-26 ENCOUNTER — OUTSIDE SERVICES (OUTPATIENT)
Dept: INTERNAL MEDICINE | Age: 82
End: 2022-05-26
Payer: MEDICARE

## 2022-05-26 VITALS
RESPIRATION RATE: 16 BRPM | DIASTOLIC BLOOD PRESSURE: 61 MMHG | HEART RATE: 64 BPM | SYSTOLIC BLOOD PRESSURE: 131 MMHG | TEMPERATURE: 97.9 F

## 2022-05-26 DIAGNOSIS — R60.0 BILATERAL LOWER EXTREMITY EDEMA: ICD-10-CM

## 2022-05-26 DIAGNOSIS — I10 ESSENTIAL HYPERTENSION: ICD-10-CM

## 2022-05-26 DIAGNOSIS — N18.9 CHRONIC KIDNEY DISEASE, UNSPECIFIED CKD STAGE: ICD-10-CM

## 2022-05-26 DIAGNOSIS — Z09 HOSPITAL DISCHARGE FOLLOW-UP: Primary | ICD-10-CM

## 2022-05-26 DIAGNOSIS — L03.211 FACIAL CELLULITIS: ICD-10-CM

## 2022-05-26 DIAGNOSIS — L89.312 PRESSURE INJURY OF RIGHT BUTTOCK, STAGE 2 (HCC): ICD-10-CM

## 2022-05-26 DIAGNOSIS — K04.7 DENTAL ABSCESS: ICD-10-CM

## 2022-05-26 DIAGNOSIS — G12.23 PRIMARY LATERAL SCLEROSIS (HCC): ICD-10-CM

## 2022-05-26 PROCEDURE — 1111F DSCHRG MED/CURRENT MED MERGE: CPT | Performed by: NURSE PRACTITIONER

## 2022-05-26 PROCEDURE — 99495 TRANSJ CARE MGMT MOD F2F 14D: CPT | Performed by: NURSE PRACTITIONER

## 2022-05-26 ASSESSMENT — ENCOUNTER SYMPTOMS
DIARRHEA: 0
BACK PAIN: 0
VOMITING: 0
SHORTNESS OF BREATH: 0
RHINORRHEA: 0
COUGH: 0
WHEEZING: 0

## 2022-05-26 NOTE — DISCHARGE SUMMARY
[address]                 [address] [place] [address], 00 [facility]                               DISCHARGE SUMMARY    PATIENT NAME: Elmer Monterroso                      :        1940  MED REC NO:   [id number]                             ROOM:       4722  ACCOUNT NO:   [de-identified]                           ADMIT DATE: 2022  PROVIDER:     Nan Lyons. Vinay Alexander MD                  DISCHARGE DATE:  2022    ATTENDING PHYSICIAN OF HOSPITALIZATION:  Joaquina Brasher MD    PERSONAL CARE PROVIDER:  Rhys Patino, nurse practitioner, Family  Practice. The patient is a resident of [address] [place] [address] and receives [facility] at that facility. DIAGNOSES:  1. Dental abscess, left lower, 2022---multiple teeth broken off at gumline  2. Bilateral lower extremity edema. 3.  Right buttock stage II ulceration, POA, Mepilex applied. 4.  Hypertension. 5.  Chronic kidney disease, stage II or better. 6.  Primary lateral sclerosis, wheelchair-bound, nonambulatory. 7.  Dementia. Other medical problems set forth in the progress note of 2022,  incorporated for reference herein. HISTORY OF PRESENT ILLNESS AND HOSPITAL COURSE:  The patient is an  81-year-old white female, patient of Rhys Patino, nurse practitioner,  Family Practice. The patient is a resident of [address] [place] [address], receives  home health care per [facility]. The patient had presented with pain and  tenderness on the left lower jaw. The patient was placed on clindamycin  IV, responded to this regimen, able to be discharged to home on  2022. The patient is to be continued on clindamycin together with  probiotics. Lower extremity edema, which is chronic, improved. Right buttock, stage  II ulceration, had Mepilex applied during this hospitalization. Hypertension, blood pressure 142/64. Chronic kidney disease, remains stable at a stage II or better level.      The patient had primary lateral sclerosis, upper motor neuron disease,  which she is nonambulatory and wheelchair-bound. Dementia, has baseline function, no change. LABORATORY DATA:  Around the time of discharge, white cell count 6.5;  hemoglobin 13.8; hematocrit 41.0; and platelets 473,628. Sodium 138,  potassium 3.8, chloride 103, CO2 of 25, BUN 15, creatinine 0.59, glucose  97, calcium 8.4 to 8.9, GFR greater than 60. DISCHARGE INSTRUCTIONS/FOLLOWUP:  Discharged to [address] with  [facility] on 05/25/2022. DIET:  Regular as tolerated. ACTIVITY:  As tolerated with physical and occupational therapies  continued from the hospital setting. CONDITION AT DISCHARGE:  Fair, improved. The patient is to have assist with activities of daily living, activity  limitations, assist with transferring. MEDICATIONS:  Clindamycin 300 mg p.o. twice daily for 5 days,  hydrocodone/APAP ([place]) 5/325 one p.o. q.4 hours p.r.n. pain, probiotic  acidophilus one three times a day. FOLLOWING MEDICATIONS CONTINUED WITHOUT CHANGE:  Acetaminophen 650 mg  p.o. q.6 hours p.r.n. pain, fever, not to exceed 4 gm of acetaminophen  in a 24-hour period including the hydrocodone/APAP; aspirin 81 mg p.o.  daily; baclofen (Lioresal) 20 mg p.o. q.8 hours; DuoDERM for protection,  change every 3 days and p.r.n.; folic acid 1 mg p.o. daily; furosemide  (Lasix) 20 mg p.o. daily; metoprolol succinate (Toprol-XL) 12.5 mg p.o.  daily; oxybutynin (Ditropan XL) 5 mg p.o. daily; vitamin B12 500 mcg  p.o. daily;  vitamin D 25 mcg (1000 units) p.o. daily. Follow up with the patient's personal care provider, Julianne Perkins,  nurse practitioner, Family Practice and [facility] at  [address]. It is recommended that the patient be seen by a dentist as soon as  possible, also given the fact that many of her teeth were sheared off at  the gum level, consider oral surgeon.     Any aspect of the patient's care not discussed in the chart and/or  dictation will be addressed and treated as an outpatient. The patient's medications have been reviewed including, but not limited  to, pre-hospital, hospital and discharge medications. The patient  and/or the patient's personal representatives were specifically advised  the only medications to be taken are those set forth in the discharge  orders and no other medications should be taken. Any prior medications  not on the discharge orders are specifically discontinued.         Elvis Madera MD    D: 05/25/2022 15:09:26       T: 05/25/2022 15:11:48     [email or web address]  Job#: [id number]     Doc#: [id number]    CC:

## 2022-05-26 NOTE — PROGRESS NOTES
Post-Discharge Transitional Care Follow Up      Frida Puentes   YOB: 1940    Date of Office Visit:  5/26/2022  Date of Hospital Admission: 5/22/22  Date of Hospital Discharge: 5/25/22  Readmission Risk Score (high >=14%. Medium >=10%):Readmission Risk Score: 7.5 ( )      Care management risk score Rising risk (score 2-5) and Complex Care (Scores >=6): 0     Non face to face  following discharge, date last encounter closed (first attempt may have been earlier): N/A, patient was seen for visit 1 day post discharge    Call initiated 2 business days of discharge: N/A, patient was seen for visit 1 day post discharge    Hospital discharge follow-up  -     [place] DISCHARGE MEDS RECONCILED W/ CURRENT OUTPATIENT MED LIST  Dental abscess  Facial cellulitis  Bilateral lower extremity edema  Pressure injury of right buttock, stage 2 ([facility] [address]  Essential hypertension  Chronic kidney disease, unspecified CKD stage  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 Subjective:   HPI    Inpatient course: Discharge summary reviewed- see chart. Patient presents for transition of care visit following hospitalization for dental abscess. Initially presented with pain and tenderness of left lower jaw. She was placed on IV clindamycin and responded well to this regimen. She was discharged on oral clindamycin and probiotics. She was advised to follow-up with oral surgeon or dentist as soon as possible. Per assisted living staff member, this is being set up today. Bilateral lower extremity edema was stable during her stay, continues on Lasix. Stage II right buttock ulceration also stable, had Mepilex during hospitalization. Hypertension stable, continues on metoprolol. CKD remained stable, discharged GFR greater than 60, creatinine 0.59. Primary lateral sclerosis stable. She has been doing well since her return to facility. Patient notes that her jaw pain is improving. Denies fever, chills, nausea, vomiting, diarrhea. Interval history/Current status: stable    Patient Active Problem List   Diagnosis    Essential hypertension    Age-related osteoporosis without current pathological fracture    Primary lateral sclerosis ([address]    Urinary incontinence    Vitamin D deficiency    Gait instability    Pressure sore on buttocks, right, unstageable ([address]    Premature atrial contractions    Dental abscess    Facial cellulitis    Chronic kidney disease       Medications listed as ordered at the time of discharge from hospital     Medication List          Accurate as of May 26, 2022  2:26 PM. If you have any questions, ask your nurse or doctor. CONTINUE taking these medications    Acetaminophen 325 MG Caps  Take 650 mg by mouth every 6 hours as needed (pain)     aspirin 81 MG chewable tablet     baclofen 20 MG tablet  Commonly known as: LIORESAL     clindamycin 300 MG capsule  Commonly known as: CLEOCIN  Take 1 capsule by mouth 2 times daily for 5 days     DUODERM CGF DRESSING EX     folic acid 1 MG tablet  Commonly known as: FOLVITE     furosemide 20 MG tablet  Commonly known as: LASIX  Take 1 tablet by mouth daily     HYDROcodone-acetaminophen 5-325 MG per tablet  Commonly known as: NORCO  Take 1 tablet by mouth every 4 hours as needed for Pain for up to 3 days. metoprolol succinate 25 MG extended release tablet  Commonly known as: Toprol XL  Take 0.5 tablets by mouth daily     oxybutynin 5 MG extended release tablet  Commonly known as: DITROPAN-XL     Probiotic Acidophilus Tabs  Take 1 tablet by mouth 3 times daily for 10 days     vitamin B-12 500 MCG tablet  Commonly known as: CYANOCOBALAMIN     vitamin D 25 MCG (1000 UT) Caps             Medications marked \"taking\" at this time  No outpatient medications have been marked as taking for the 5/26/22 encounter (Outside Services) with HIEU Sanchez CNP. Medications patient taking as of now reconciled against medications ordered at time of hospital discharge: Yes    Review of Systems   Constitutional: Negative for activity change and appetite change. HENT: Negative for congestion and rhinorrhea. Respiratory: Negative for cough, shortness of breath and wheezing. Cardiovascular: Negative for chest pain and leg swelling. Gastrointestinal: Negative for diarrhea and vomiting. Genitourinary: Negative for frequency and hematuria. Musculoskeletal: Negative for back pain and myalgias. Neurological:        Unable to assess due to dementia   Psychiatric/Behavioral: Negative for agitation and behavioral problems. Objective:    /61   Pulse 64   Temp 97.9 °F (36.6 °C)   Resp 16   Physical Exam  Constitutional:       General: She is not in acute distress. Appearance: Normal appearance. HENT:      Head: Normocephalic and atraumatic. Right Ear: External ear normal.      Left Ear: External ear normal.      Nose: No rhinorrhea. Mouth/Throat:      Lips: No lesions. Eyes:      Extraocular Movements: Extraocular movements intact. Conjunctiva/sclera: Conjunctivae normal.   Neck:      Vascular: No JVD. Cardiovascular:      Rate and Rhythm: Normal rate and regular rhythm. Pulmonary:      Effort: Pulmonary effort is normal. No accessory muscle usage or respiratory distress. Breath sounds: Normal breath sounds. Musculoskeletal:      Cervical back: Normal range of motion. Skin:     Coloration: Skin is not cyanotic or jaundiced. Neurological:      General: No focal deficit present. Mental Status: She is alert. Mental status is at baseline. Psychiatric:         Attention and Perception: Attention and perception normal.         Mood and Affect: Mood and affect normal.         Speech: Speech normal.         Behavior: Behavior normal. Behavior is cooperative.           1. Hospital discharge follow-up  - [place] DISCHARGE MEDS RECONCILED W/ CURRENT OUTPATIENT MED LIST    2. Dental abscess, improving  3. Facial cellulitis, improving  - Continue clindamycin. Follow up with dentist/oral surgeon as noted above    4. Bilateral lower extremity edema, stable  - Continue current medications    5. Pressure injury of right buttock, stage 2 ([facility] [address] stable  - Staff will continue wound care    6. Essential hypertension,  stable  - Continue current medications    7. Chronic kidney disease, unspecified CKD stage, stable  - Continue to monitor    8.  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

## 2022-05-27 ENCOUNTER — FOLLOWUP TELEPHONE ENCOUNTER (OUTPATIENT)
Dept: INPATIENT UNIT | Age: 82
End: 2022-05-27

## 2022-05-28 LAB
CULTURE: NORMAL
CULTURE: NORMAL
SPECIMEN DESCRIPTION: NORMAL
SPECIMEN DESCRIPTION: NORMAL

## 2022-06-07 ENCOUNTER — OUTSIDE SERVICES (OUTPATIENT)
Dept: INTERNAL MEDICINE | Age: 82
End: 2022-06-07
Payer: MEDICARE

## 2022-06-07 DIAGNOSIS — J06.9 VIRAL UPPER RESPIRATORY TRACT INFECTION: Primary | ICD-10-CM

## 2022-06-07 NOTE — PROGRESS NOTES
[facility]      Leena Lockhart is a 81 y.o. female resident of [address] [place] [address]  who presents today for medical conditions/complaints as noted below. HPI:     HPI    Patient presents for evaluation and management of cough. Symptoms started several days ago, have been relatively stable. Patient denies dyspnea. Has noted congestion. Denies fever, chills, nausea, vomiting, or diarrhea. SpO2 95% RA. She was recently treated for dental abscess per previous note, and has since had several extractions. Tooth pain has improved. Has completed previous course of antibiotics. Current Outpatient Medications   Medication Sig Dispense Refill    metoprolol succinate (TOPROL XL) 25 MG extended release tablet Take 0.5 tablets by mouth daily 30 tablet 3    Control Gel Formula Dressing (DUODERM CGF DRESSING EX) To right buttock (protection), change q3d and prn       Acetaminophen 325 MG CAPS Take 650 mg by mouth every 6 hours as needed (pain) 120 capsule 0    vitamin D 25 MCG (1000 UT) CAPS Take 1,000 Units by mouth daily      aspirin 81 MG chewable tablet Take 81 mg by mouth daily      oxybutynin (DITROPAN-XL) 5 MG extended release tablet Take 5 mg by mouth daily      furosemide (LASIX) 20 MG tablet Take 1 tablet by mouth daily 90 tablet 1    baclofen (LIORESAL) 20 MG tablet Take 20 mg by mouth every 8 hours       folic acid (FOLVITE) 1 MG tablet Take 1 mg by mouth daily      vitamin B-12 (CYANOCOBALAMIN) 500 MCG tablet Take 500 mcg by mouth daily. No current facility-administered medications for this visit.      No Known Allergies    Health Maintenance   Topic Date Due    COVID-19 Vaccine (1) Never done    Depression Screen  Never done    Shingles vaccine (1 of 2) Never done    DTaP/Tdap/Td vaccine (1 - Tdap) 07/17/2009    Annual Wellness Visit (AWV)  04/11/2020    DEXA (modify frequency per FRAX score)  Completed    Flu vaccine  Completed    Pneumococcal 65+ years Vaccine Completed    Hepatitis A vaccine  Aged Out    Hepatitis B vaccine  Aged Out    Hib vaccine  Aged Out    Meningococcal (ACWY) vaccine  Aged Out       Subjective:      Review of Systems   Constitutional: Negative for chills and fever. HENT: Positive for congestion. Negative for rhinorrhea. Respiratory: Positive for cough. Negative for shortness of breath and wheezing. Cardiovascular: Negative for chest pain and leg swelling. Gastrointestinal: Negative for diarrhea, nausea and vomiting. Neurological: Negative for dizziness and weakness. Objective:     Vitals:    06/08/22 1339   BP: 134/70   Pulse: 58   Resp: 20   Temp: 97.9 °F (36.6 °C)   SpO2: 95%     Physical Exam  Vitals and nursing note reviewed. Constitutional:       General: She is not in acute distress. Appearance: She is well-developed. Cardiovascular:      Rate and Rhythm: Normal rate and regular rhythm. Pulmonary:      Effort: Pulmonary effort is normal.      Breath sounds: Normal breath sounds. Abdominal:      Palpations: Abdomen is soft. Tenderness: There is no abdominal tenderness. Lymphadenopathy:      Cervical: No cervical adenopathy. Neurological:      Mental Status: She is alert. Psychiatric:         Behavior: Behavior normal.         Assessment/Plan:        1. Viral upper respiratory tract infection  - Supportive care discussed. Patient declines cough suppressant at this time. Will monitor for resolution of symptoms. Return if symptoms worsen or fail to improve. No orders of the defined types were placed in this encounter. No orders of the defined types were placed in this encounter.               Electronically signed by HIEU Walker CNP on 6/8/2022 at 2:55 PM

## 2022-06-08 VITALS
SYSTOLIC BLOOD PRESSURE: 134 MMHG | DIASTOLIC BLOOD PRESSURE: 70 MMHG | RESPIRATION RATE: 20 BRPM | OXYGEN SATURATION: 95 % | TEMPERATURE: 97.9 F | HEART RATE: 58 BPM

## 2022-06-08 ASSESSMENT — ENCOUNTER SYMPTOMS
COUGH: 1
NAUSEA: 0
SHORTNESS OF BREATH: 0
WHEEZING: 0
DIARRHEA: 0
RHINORRHEA: 0
VOMITING: 0

## 2022-06-23 ENCOUNTER — OUTSIDE SERVICES (OUTPATIENT)
Dept: INTERNAL MEDICINE | Age: 82
End: 2022-06-23
Payer: MEDICARE

## 2022-06-23 VITALS
HEART RATE: 61 BPM | RESPIRATION RATE: 20 BRPM | TEMPERATURE: 97.7 F | DIASTOLIC BLOOD PRESSURE: 58 MMHG | SYSTOLIC BLOOD PRESSURE: 136 MMHG

## 2022-06-23 DIAGNOSIS — N18.9 CHRONIC KIDNEY DISEASE, UNSPECIFIED CKD STAGE: ICD-10-CM

## 2022-06-23 DIAGNOSIS — I10 ESSENTIAL HYPERTENSION: Primary | ICD-10-CM

## 2022-06-23 DIAGNOSIS — L89.310 PRESSURE SORE ON BUTTOCKS, RIGHT, UNSTAGEABLE (HCC): ICD-10-CM

## 2022-06-23 DIAGNOSIS — R60.0 BILATERAL LOWER EXTREMITY EDEMA: ICD-10-CM

## 2022-06-23 DIAGNOSIS — I49.1 PREMATURE ATRIAL CONTRACTIONS: ICD-10-CM

## 2022-06-23 DIAGNOSIS — L03.211 FACIAL CELLULITIS: ICD-10-CM

## 2022-06-23 DIAGNOSIS — G12.23 PRIMARY LATERAL SCLEROSIS (HCC): ICD-10-CM

## 2022-06-23 DIAGNOSIS — K04.7 DENTAL ABSCESS: ICD-10-CM

## 2022-06-23 DIAGNOSIS — R26.81 GAIT INSTABILITY: ICD-10-CM

## 2022-06-23 DIAGNOSIS — E55.9 VITAMIN D DEFICIENCY: ICD-10-CM

## 2022-06-23 DIAGNOSIS — R32 URINARY INCONTINENCE, UNSPECIFIED TYPE: ICD-10-CM

## 2022-06-23 ASSESSMENT — ENCOUNTER SYMPTOMS
VOMITING: 0
SHORTNESS OF BREATH: 0
NAUSEA: 0
CHEST TIGHTNESS: 0
DIARRHEA: 0
SORE THROAT: 0

## 2022-06-23 NOTE — PROGRESS NOTES
[facility]      Carmen Menard is a 81 y.o. female resident of [address] [place] [address]  who presents today for medical conditions/complaints as noted below. HPI:     HPI   Patient presents via virtual visit with assisted living staff member for evaluation and management of medical conditions as noted below. Hypertension is well controlled on metoprolol. Denies chest pain or dyspnea. Continues to follow with cardiology for premature atrial contractions. Denies palpitations. CKD stable, GFR>60, Cr 0.59    Previously declined further follow up with neurology for primary lateral sclerosis, symptoms stable. Staff continues to assist with activities of daily living. Continues on baclofen. Addendum 07/26/22:  She is unable to ambulate without the use of a wheelchair due to lower extremity weakness from primary lateral sclerosis. Cannot complete ADLs without assistance from staff and use of wheelchair    Vitamin D deficiency stable on supplement. Continues on oxybutynin for urinary incontinence, which is stable. Continues to have some issues with BLE edema. Discussed compression stockings. Previously noted facial cellulitis and dental abscess have resolved. Staff notes she recently had her teeth pulled and has completed antibiotics. Plans to undergo fitting for dentures in the near future. Staff continues dressing changes for pressure sore on right buttocks.   Gradually improving    Current Outpatient Medications   Medication Sig Dispense Refill    metoprolol succinate (TOPROL XL) 25 MG extended release tablet Take 0.5 tablets by mouth daily 30 tablet 3    Control Gel Formula Dressing (DUODERM CGF DRESSING EX) To right buttock (protection), change q3d and prn       Acetaminophen 325 MG CAPS Take 650 mg by mouth every 6 hours as needed (pain) 120 capsule 0    vitamin D 25 MCG (1000 UT) CAPS Take 1,000 Units by mouth daily      aspirin 81 MG chewable tablet Take 81 mg by mouth daily      oxybutynin (DITROPAN-XL) 5 MG extended release tablet Take 5 mg by mouth daily      furosemide (LASIX) 20 MG tablet Take 1 tablet by mouth daily 90 tablet 1    baclofen (LIORESAL) 20 MG tablet Take 20 mg by mouth every 8 hours       folic acid (FOLVITE) 1 MG tablet Take 1 mg by mouth daily      vitamin B-12 (CYANOCOBALAMIN) 500 MCG tablet Take 500 mcg by mouth daily. No current facility-administered medications for this visit. No Known Allergies    Health Maintenance   Topic Date Due    COVID-19 Vaccine (1) Never done    Depression Screen  Never done    Shingles vaccine (1 of 2) Never done    DTaP/Tdap/Td vaccine (1 - Tdap) 07/17/2009    Annual Wellness Visit (AWV)  04/11/2020    DEXA (modify frequency per FRAX score)  Completed    Flu vaccine  Completed    Pneumococcal 65+ years Vaccine  Completed    Hepatitis A vaccine  Aged Out    Hepatitis B vaccine  Aged Out    Hib vaccine  Aged Out    Meningococcal (ACWY) vaccine  Aged Out       Subjective:      Review of Systems   Constitutional: Negative for chills and fever. HENT: Negative for congestion and sore throat. Respiratory: Negative for chest tightness and shortness of breath. Cardiovascular: Positive for leg swelling. Negative for chest pain. Gastrointestinal: Negative for diarrhea, nausea and vomiting. Genitourinary: Negative for difficulty urinating and dysuria. Musculoskeletal: Negative for gait problem and myalgias. Neurological: Negative for dizziness and headaches. Psychiatric/Behavioral: Negative for confusion and decreased concentration. Due to patient's clinical status, ROS of symptoms was completed by discussion with long term care facility staff, as well as with input from patient. Objective:     Vitals:    06/23/22 1233   BP: (!) 136/58   Pulse: 61   Resp: 20   Temp: 97.7 °F (36.5 °C)     Physical Exam  Constitutional:       General: She is not in acute distress. Appearance: Normal appearance. HENT:      Head: Normocephalic and atraumatic. Right Ear: External ear normal.      Left Ear: External ear normal.      Nose: No rhinorrhea. Mouth/Throat:      Lips: No lesions. Eyes:      Conjunctiva/sclera: Conjunctivae normal.   Neck:      Vascular: No JVD. Pulmonary:      Effort: Pulmonary effort is normal. No accessory muscle usage or respiratory distress. Musculoskeletal:      Cervical back: Normal range of motion. Skin:     Coloration: Skin is not cyanotic or jaundiced. Neurological:      Mental Status: She is alert. Mental status is at baseline. Psychiatric:         Attention and Perception: Attention and perception normal.         Mood and Affect: Mood and affect normal.         Speech: Speech normal.         Behavior: Behavior is cooperative. Thought Content: Thought content normal.         Assessment/Plan:        1. Essential hypertension,  stable  - Continue current medications    2. Premature atrial contractions, stable  - Continue to follow with cardiology    3. Chronic kidney disease, unspecified CKD stage, stable  - Continue to monitor, BMP reviewed    4. Primary lateral sclerosis ([address]  stable  - Continue current medications    5. Vitamin D deficiency, sstable  - Continue supplement    6. Urinary incontinence, unspecified type,  stable  - Continue current medications    7. Bilateral lower extremity edema, stable  - Start compression stockings    8. Facial cellulitis, resolved  9. Dental abscess, resolved  - Monitor for any symptoms of recurrence    10. Gait instability, stable  - Continue to monitor    11. Pressure sore on buttocks, right, unstageable ([address] stable  - Continue wound care        Return in about 3 months (around 9/23/2022). No orders of the defined types were placed in this encounter. No orders of the defined types were placed in this encounter.               Electronically signed by HIEU Brown CNP on 6/23/2022 at 1:24 PM Binu Shepard, was evaluated through a synchronous (real-time) audio-video encounter. The patient (or guardian if applicable) is aware that this is a billable service, which includes applicable co-pays. This Virtual Visit was conducted with patient's (and/or legal guardian's) consent. The visit was conducted pursuant to the emergency declaration under the [address] [place] [address] authority and the [employer] [place] and [place] [employer] Act. Patient identification was verified, and a caregiver was present when appropriate. The patient was located at [place] [address] [facility] [address]  Provider was located at [place] [address] ([address] [place], [place] [address]t): [address]  [place],  [address]ell[address]. Total time spent for this encounter: Not billed by time    --HIEU Balderrama CNP on 6/23/2022 at 1:24 PM    An electronic signature was used to authenticate this note.

## 2022-07-11 ENCOUNTER — TELEPHONE (OUTPATIENT)
Dept: INTERNAL MEDICINE | Age: 82
End: 2022-07-11
Payer: MEDICARE

## 2022-07-11 DIAGNOSIS — G12.23 PRIMARY LATERAL SCLEROSIS (HCC): Primary | ICD-10-CM

## 2022-07-11 DIAGNOSIS — I10 ESSENTIAL HYPERTENSION: ICD-10-CM

## 2022-07-11 DIAGNOSIS — R32 URINARY INCONTINENCE, UNSPECIFIED TYPE: ICD-10-CM

## 2022-07-11 DIAGNOSIS — L03.211 FACIAL CELLULITIS: ICD-10-CM

## 2022-07-11 DIAGNOSIS — E55.9 VITAMIN D DEFICIENCY: ICD-10-CM

## 2022-07-11 DIAGNOSIS — Z79.82 LONG-TERM USE OF ASPIRIN THERAPY: ICD-10-CM

## 2022-07-11 PROCEDURE — G0179 MD RECERTIFICATION HHA PT: HCPCS | Performed by: NURSE PRACTITIONER

## 2022-07-20 ENCOUNTER — TELEPHONE (OUTPATIENT)
Dept: INTERNAL MEDICINE | Age: 82
End: 2022-07-20

## 2022-07-20 DIAGNOSIS — M81.0 AGE-RELATED OSTEOPOROSIS WITHOUT CURRENT PATHOLOGICAL FRACTURE: Primary | ICD-10-CM

## 2022-07-20 DIAGNOSIS — M81.0 OSTEOPOROSIS OF LOWER LEG WITHOUT PATHOLOGICAL FRACTURE: ICD-10-CM

## 2022-07-20 DIAGNOSIS — G12.23 PRIMARY LATERAL SCLEROSIS (HCC): ICD-10-CM

## 2022-07-20 NOTE — TELEPHONE ENCOUNTER
Last appt: 06/23/22  Next appt: Visit date not found      Gill from [facility] called in asking for a script for a wheelchair. She said she received the fax where Samuel Hanks signed it but no script. She will also need the most recent office note that talks about patient needing the wheelchair. Call [phone] ext [phone] if you have any question. Looks like Yulisa sent over the form for us to fill out but did not ask for the script to go with it so [facility] has no information on this patient.

## 2022-07-21 RX ORDER — WHEELCHAIR
EACH MISCELLANEOUS
Qty: 1 EACH | Refills: 0 | Status: SHIPPED | OUTPATIENT
Start: 2022-07-21 | End: 2022-07-26 | Stop reason: SDUPTHER

## 2022-07-26 RX ORDER — WHEELCHAIR
EACH MISCELLANEOUS
Qty: 1 EACH | Refills: 0 | Status: SHIPPED | OUTPATIENT
Start: 2022-07-26 | End: 2022-08-08

## 2022-07-26 NOTE — TELEPHONE ENCOUNTER
Jr Claire nurse [facility] [place]) reports that [facility] needs a different dx, and updated documentation. Spoke with [facility] Jaymie Sanabria [phone])- dx needs to state lower region. And note needs to state her inability to walk, and that a w/c is needed to alow pt to complete her daily ADLs. Please update 6/23/22 note, and approve new script (pended).

## 2022-07-27 NOTE — TELEPHONE ENCOUNTER
New script and addended note faxed to [facility] ([phone]); along with copy of insurance card.

## 2022-09-06 ENCOUNTER — TELEPHONE (OUTPATIENT)
Dept: INTERNAL MEDICINE | Age: 82
End: 2022-09-06
Payer: MEDICARE

## 2022-09-06 DIAGNOSIS — I10 ESSENTIAL HYPERTENSION: ICD-10-CM

## 2022-09-06 DIAGNOSIS — L03.211 CELLULITIS OF FACE: ICD-10-CM

## 2022-09-06 DIAGNOSIS — G12.23 PRIMARY LATERAL SCLEROSIS (HCC): Primary | ICD-10-CM

## 2022-09-06 DIAGNOSIS — E55.9 VITAMIN D DEFICIENCY: ICD-10-CM

## 2022-09-06 PROCEDURE — G0179 MD RECERTIFICATION HHA PT: HCPCS | Performed by: NURSE PRACTITIONER

## 2022-09-21 NOTE — LETTER
"ASSESSMENT/PLAN:    Annual Exam/Preventive Issues   -Check Lipids, BMP  - Give Influenza and Tdap vaccines  - Suggested more exercise, e.g. weight lifting and outdoor leisure exercise  - Suggested discontinuing Vaping. Rakesh \"not ready now\"  -Suggested having a few nights/week of abstinence from alcohol.       -Specific concerns:     1. Tinea Cruris  - Tinactin  -Keep area dry  - Minimize eating sugar   - If no improvement, let us know      -Follow up: in 1 year or sooner if needed    Urban Belcher MD, MS  " Orders Placed This Encounter   Procedures    VL CAROTID BILATERAL     Standing Status:   Future     Standing Expiration Date:   11/8/2018    CT Head WO Contrast     Standing Status:   Future     Standing Expiration Date:   11/8/2018             Physical Exam      If you have questions, please do not hesitate to call me. I look forward to following Araceli Peraza along with you. Sincerely,        Belkys Murphy MD    CC providers:  Yaniv Koenig MD  [address]

## 2022-09-28 ENCOUNTER — OUTSIDE SERVICES (OUTPATIENT)
Dept: INTERNAL MEDICINE | Age: 82
End: 2022-09-28
Payer: MEDICARE

## 2022-09-28 VITALS
SYSTOLIC BLOOD PRESSURE: 146 MMHG | RESPIRATION RATE: 20 BRPM | OXYGEN SATURATION: 96 % | DIASTOLIC BLOOD PRESSURE: 84 MMHG | TEMPERATURE: 97.6 F | HEART RATE: 60 BPM

## 2022-09-28 DIAGNOSIS — N18.9 CHRONIC KIDNEY DISEASE, UNSPECIFIED CKD STAGE: ICD-10-CM

## 2022-09-28 DIAGNOSIS — I10 ESSENTIAL HYPERTENSION: Primary | ICD-10-CM

## 2022-09-28 DIAGNOSIS — R60.0 BILATERAL LOWER EXTREMITY EDEMA: ICD-10-CM

## 2022-09-28 DIAGNOSIS — R32 URINARY INCONTINENCE, UNSPECIFIED TYPE: ICD-10-CM

## 2022-09-28 DIAGNOSIS — I49.1 PREMATURE ATRIAL CONTRACTIONS: ICD-10-CM

## 2022-09-28 DIAGNOSIS — M81.0 OSTEOPOROSIS OF LOWER LEG WITHOUT PATHOLOGICAL FRACTURE: ICD-10-CM

## 2022-09-28 DIAGNOSIS — G12.23 PRIMARY LATERAL SCLEROSIS (HCC): ICD-10-CM

## 2022-09-28 DIAGNOSIS — R26.81 GAIT INSTABILITY: ICD-10-CM

## 2022-09-28 DIAGNOSIS — E55.9 VITAMIN D DEFICIENCY: ICD-10-CM

## 2022-09-28 NOTE — PROGRESS NOTES
[facility]      Cresencio Jain is a 81 y.o. female resident of [address] [place] [address]  who presents today for medical conditions/complaints as noted below. HPI:     HPI   Patient presents via virtual visit with assisted living staff member for evaluation and management of medical conditions as noted below. Hypertension is slightly elevated today, though generally well controlled on metoprolol. Denies chest pain or dyspnea. Follows with cardiology for premature atrial contraction. Denies any recurrence of palpitations. CKD stable, GFR greater than 60, creatinine 0.59. Previously declined further follow-up from neurology for primary lateral sclerosis. Symptoms have been relatively stable and requires staff assistance for activities of daily living. Stable on baclofen. Vitamin D deficiency stable on supplement. Urinary incontinence stable on oxybutynin. Bilateral lower extremity edema stable. Pressure sore on right buttocks has healed. Current Outpatient Medications   Medication Sig Dispense Refill    metoprolol succinate (TOPROL XL) 25 MG extended release tablet Take 0.5 tablets by mouth daily 30 tablet 3    Control Gel Formula Dressing (DUODERM CGF DRESSING EX) To right buttock (protection), change q3d and prn       Acetaminophen 325 MG CAPS Take 650 mg by mouth every 6 hours as needed (pain) 120 capsule 0    vitamin D 25 MCG (1000 UT) CAPS Take 1,000 Units by mouth daily      aspirin 81 MG chewable tablet Take 81 mg by mouth daily      oxybutynin (DITROPAN-XL) 5 MG extended release tablet Take 5 mg by mouth daily      furosemide (LASIX) 20 MG tablet Take 1 tablet by mouth daily 90 tablet 1    baclofen (LIORESAL) 20 MG tablet Take 20 mg by mouth Every 8 hours      folic acid (FOLVITE) 1 MG tablet Take 1 mg by mouth daily      vitamin B-12 (CYANOCOBALAMIN) 500 MCG tablet Take 500 mcg by mouth daily.        No current facility-administered medications for this visit. No Known Allergies    Health Maintenance   Topic Date Due    Depression Screen  Never done    Shingles vaccine (1 of 2) Never done    DTaP/Tdap/Td vaccine (1 - Tdap) 07/17/2009    Annual Wellness Visit (AWV)  04/11/2020    Flu vaccine (1) 08/01/2022    DEXA (modify frequency per FRAX score)  Completed    Pneumococcal 65+ years Vaccine  Completed    COVID-19 Vaccine  Completed    Hepatitis A vaccine  Aged Out    Hepatitis B vaccine  Aged Out    Hib vaccine  Aged Out    Meningococcal (ACWY) vaccine  Aged Out       Subjective:      Review of Systems   Constitutional:  Negative for chills and fever. HENT:  Negative for congestion and sore throat. Respiratory:  Negative for chest tightness and shortness of breath. Cardiovascular:  Positive for leg swelling. Negative for chest pain. Gastrointestinal:  Negative for diarrhea, nausea and vomiting. Genitourinary:  Negative for difficulty urinating and dysuria. Musculoskeletal:  Negative for gait problem and myalgias. Neurological:  Negative for dizziness and headaches. Psychiatric/Behavioral:  Negative for confusion and decreased concentration. Due to patient's clinical status, ROS of symptoms was completed by discussion with long term care facility staff, as well as with input from patient. Objective:     Vitals:    09/28/22 1309   BP: (!) 146/84   Pulse: 60   Resp: 20   Temp: 97.6 °F (36.4 °C)   SpO2: 96%     Physical Exam  Constitutional:       General: She is not in acute distress. HENT:      Head: Normocephalic and atraumatic. Right Ear: External ear normal.      Left Ear: External ear normal.   Eyes:      Extraocular Movements: Extraocular movements intact. Conjunctiva/sclera: Conjunctivae normal.   Cardiovascular:      Rate and Rhythm: Normal rate and regular rhythm. Pulmonary:      Effort: Pulmonary effort is normal. No respiratory distress. Breath sounds: Normal breath sounds.    Abdominal:      Palpations: Abdomen is soft. Tenderness: There is no abdominal tenderness. Musculoskeletal:      Comments: 1+ BLE edema   Skin:     General: Skin is warm and dry. Neurological:      General: No focal deficit present. Mental Status: She is alert. Mental status is at baseline. Psychiatric:         Mood and Affect: Mood normal.         Behavior: Behavior normal.       Assessment/Plan:        1. Essential hypertension,  stable  - Continue current medications    2. Premature atrial contractions, stable  - Continue to follow with cardiology    3. Chronic kidney disease, unspecified CKD stage, stable  - Continue to monitor, BMP reviewed    4. 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 Vitamin D deficiency, sstable  - Continue supplement    6. Urinary incontinence, unspecified type,  stable  - Continue current medications    7. Bilateral lower extremity edema, stable  - Start compression stockings    8. Gait instability,  stable  - Continue to monitor    9. Osteoporosis, stable  - Continue to monitor          Return in about 3 months (around 12/28/2022). No orders of the defined types were placed in this encounter. No orders of the defined types were placed in this encounter.               Electronically signed by HIEU Cullen CNP on 9/30/2022 at 9:38 AM

## 2022-09-30 ASSESSMENT — ENCOUNTER SYMPTOMS
SORE THROAT: 0
CHEST TIGHTNESS: 0
NAUSEA: 0
DIARRHEA: 0
SHORTNESS OF BREATH: 0
VOMITING: 0

## 2022-11-01 ENCOUNTER — TELEPHONE (OUTPATIENT)
Dept: INTERNAL MEDICINE | Age: 82
End: 2022-11-01
Payer: MEDICARE

## 2022-11-01 DIAGNOSIS — R32 URINARY INCONTINENCE, UNSPECIFIED TYPE: ICD-10-CM

## 2022-11-01 DIAGNOSIS — Z79.82 LONG-TERM USE OF ASPIRIN THERAPY: ICD-10-CM

## 2022-11-01 DIAGNOSIS — E55.9 VITAMIN D DEFICIENCY: ICD-10-CM

## 2022-11-01 DIAGNOSIS — I10 ESSENTIAL HYPERTENSION: ICD-10-CM

## 2022-11-01 DIAGNOSIS — L89.310 PRESSURE SORE ON BUTTOCKS, RIGHT, UNSTAGEABLE (HCC): ICD-10-CM

## 2022-11-01 DIAGNOSIS — L03.211 CELLULITIS OF FACE: ICD-10-CM

## 2022-11-01 DIAGNOSIS — G12.23 PRIMARY LATERAL SCLEROSIS (HCC): Primary | ICD-10-CM

## 2022-11-01 PROCEDURE — G0179 MD RECERTIFICATION HHA PT: HCPCS | Performed by: NURSE PRACTITIONER

## 2022-11-29 RX ORDER — GUAIFENESIN 100 MG/5ML
200 SYRUP ORAL 3 TIMES DAILY PRN
Qty: 180 ML | Refills: 0 | Status: SHIPPED | OUTPATIENT
Start: 2022-11-29

## 2022-12-27 ENCOUNTER — TELEPHONE (OUTPATIENT)
Dept: INTERNAL MEDICINE | Age: 82
End: 2022-12-27
Payer: MEDICARE

## 2022-12-27 DIAGNOSIS — L89.310 PRESSURE SORE ON BUTTOCKS, RIGHT, UNSTAGEABLE (HCC): ICD-10-CM

## 2022-12-27 DIAGNOSIS — L03.211 CELLULITIS OF FACE: ICD-10-CM

## 2022-12-27 DIAGNOSIS — I10 ESSENTIAL HYPERTENSION: ICD-10-CM

## 2022-12-27 DIAGNOSIS — G12.23 PRIMARY LATERAL SCLEROSIS (HCC): Primary | ICD-10-CM

## 2022-12-27 DIAGNOSIS — Z79.82 LONG-TERM USE OF ASPIRIN THERAPY: ICD-10-CM

## 2022-12-27 DIAGNOSIS — R32 URINARY INCONTINENCE, UNSPECIFIED TYPE: ICD-10-CM

## 2022-12-27 PROCEDURE — G0179 MD RECERTIFICATION HHA PT: HCPCS | Performed by: NURSE PRACTITIONER

## 2023-01-05 ENCOUNTER — OUTSIDE SERVICES (OUTPATIENT)
Dept: INTERNAL MEDICINE | Age: 83
End: 2023-01-05

## 2023-01-16 ENCOUNTER — TELEPHONE (OUTPATIENT)
Dept: INTERNAL MEDICINE | Age: 83
End: 2023-01-16

## 2023-02-09 ENCOUNTER — TELEPHONE (OUTPATIENT)
Dept: INTERNAL MEDICINE | Age: 83
End: 2023-02-09

## 2023-02-09 ENCOUNTER — OUTSIDE SERVICES (OUTPATIENT)
Dept: INTERNAL MEDICINE | Age: 83
End: 2023-02-09

## 2023-02-09 VITALS
HEART RATE: 60 BPM | DIASTOLIC BLOOD PRESSURE: 78 MMHG | SYSTOLIC BLOOD PRESSURE: 130 MMHG | TEMPERATURE: 97.3 F | RESPIRATION RATE: 18 BRPM

## 2023-02-09 DIAGNOSIS — L89.310 PRESSURE SORE ON BUTTOCKS, RIGHT, UNSTAGEABLE (HCC): ICD-10-CM

## 2023-02-09 DIAGNOSIS — G12.23 PRIMARY LATERAL SCLEROSIS (HCC): Primary | ICD-10-CM

## 2023-02-09 ASSESSMENT — ENCOUNTER SYMPTOMS
DIARRHEA: 0
WHEEZING: 0
COUGH: 0
RHINORRHEA: 0
NAUSEA: 0
VOMITING: 0

## 2023-02-09 NOTE — PROGRESS NOTES
[address] [place] [address] Dr. Butch Dahl is a 81 y.o. female resident of [address] [place] [address] . HPI:     HPI    Patient presents for face-to-face visit for hospital bed. Patient has known history of primary lateral sclerosis. She has been requiring increased assistance from staff for her activities of daily living. She has a known history of a pressure sore on her right buttocks, which has proved difficult to heal.  She is otherwise stable. Denies fever, chills, nausea, vomiting, diarrhea. Current Outpatient Medications   Medication Sig Dispense Refill    ZINC OXIDE EX Apply topically 3 times daily as needed to buttocks for protection      guaiFENesin (ROBITUSSIN) 100 MG/5ML liquid Take 10 mLs by mouth 3 times daily as needed for Cough 180 mL 0    metoprolol succinate (TOPROL XL) 25 MG extended release tablet Take 0.5 tablets by mouth daily 30 tablet 3    Acetaminophen 325 MG CAPS Take 650 mg by mouth every 6 hours as needed (pain) 120 capsule 0    vitamin D 25 MCG (1000 UT) CAPS Take 1,000 Units by mouth daily      aspirin 81 MG chewable tablet Take 81 mg by mouth daily      oxybutynin (DITROPAN-XL) 5 MG extended release tablet Take 5 mg by mouth daily      furosemide (LASIX) 20 MG tablet Take 1 tablet by mouth daily 90 tablet 1    baclofen (LIORESAL) 20 MG tablet Take 20 mg by mouth Every 8 hours      folic acid (FOLVITE) 1 MG tablet Take 1 mg by mouth daily      vitamin B-12 (CYANOCOBALAMIN) 500 MCG tablet Take 500 mcg by mouth daily. No current facility-administered medications for this visit.      No Known Allergies    Health Maintenance   Topic Date Due    Depression Screen  Never done    Shingles vaccine (1 of 2) Never done    DTaP/Tdap/Td vaccine (1 - Tdap) 07/17/2009    Annual Wellness Visit (AWV)  04/11/2020    Flu vaccine (1) 08/01/2022    COVID-19 Vaccine (5 - Booster for Pfizer series) 09/09/2022    DEXA (modify frequency per FRAX score)  Completed    Pneumococcal 65+ years Vaccine  Completed    Hepatitis A vaccine  Aged Out    Hib vaccine  Aged Out    Meningococcal (ACWY) vaccine  Aged Out       Subjective:      Review of Systems   Constitutional:  Negative for chills and fever. HENT:  Negative for congestion and rhinorrhea. Respiratory:  Negative for cough and wheezing. Cardiovascular:  Negative for chest pain and leg swelling. Gastrointestinal:  Negative for diarrhea, nausea and vomiting. Neurological:  Positive for weakness. Negative for dizziness. Due to patient's clinical status, ROS of symptoms was completed by discussion with long term care facility staff, as well as with input from patient. Objective:     Vitals:    02/09/23 1123   BP: 130/78   Pulse: 60   Resp: 18   Temp: 97.3 °F (36.3 °C)     Physical Exam  Vitals reviewed. Constitutional:       General: She is not in acute distress. Appearance: She is not ill-appearing. HENT:      Head: Normocephalic and atraumatic. Right Ear: External ear normal.      Left Ear: External ear normal.   Eyes:      Extraocular Movements: Extraocular movements intact. Conjunctiva/sclera: Conjunctivae normal.   Cardiovascular:      Rate and Rhythm: Normal rate and regular rhythm. Pulmonary:      Effort: Pulmonary effort is normal. No respiratory distress. Breath sounds: Normal breath sounds. Skin:     Coloration: Skin is not jaundiced or pale. Neurological:      General: No focal deficit present. Mental Status: She is alert. Mental status is at baseline. Psychiatric:         Mood and Affect: Mood normal.         Behavior: Behavior normal.       Assessment/Plan:        1. Primary lateral sclerosis ([address]  2. Pressure sore on buttocks, right, unstageable ([address]  - Order placed for hospital bed      No follow-ups on file. No orders of the defined types were placed in this encounter. No orders of the defined types were placed in this encounter.               Electronically signed by Roberto Arguello Yovaan Dorantes, APRN - CNP on 2/9/2023 at 1:38 PM

## 2023-02-09 NOTE — TELEPHONE ENCOUNTER
As follow-up from visit, please fax completed progress note to [facility] for face-to-face documentation, thanks

## 2023-02-13 ENCOUNTER — HOSPITAL ENCOUNTER (OUTPATIENT)
Age: 83
Setting detail: SPECIMEN
Discharge: HOME | End: 2023-02-13
Payer: MEDICARE

## 2023-02-13 LAB
BACTERIA: ABNORMAL
BILIRUBIN URINE: NEGATIVE
COLOR: YELLOW
COMMENT UA: ABNORMAL
EPITHELIAL CELLS UA: ABNORMAL /HPF (ref 0–5)
GLUCOSE UR STRIP.AUTO-MCNC: NEGATIVE MG/DL
KETONES UR STRIP.AUTO-MCNC: NEGATIVE MG/DL
LEUKOCYTE ESTERASE UR QL STRIP.AUTO: NEGATIVE
NITRITE UR QL STRIP.AUTO: NEGATIVE
PROT UR STRIP.AUTO-MCNC: 6.5 MG/DL (ref 5–6)
PROT UR STRIP.AUTO-MCNC: NEGATIVE MG/DL
RBC CLUMPS #/AREA URNS AUTO: ABNORMAL /HPF (ref 0–4)
SPECIFIC GRAVITY UA: 1.02 (ref 1.01–1.02)
TURBIDITY: CLEAR
URINE HGB: NEGATIVE
UROBILINOGEN, URINE: NORMAL
WBC UA: ABNORMAL /HPF (ref 0–4)

## 2023-02-13 PROCEDURE — 81015 MICROSCOPIC EXAM OF URINE: CPT

## 2023-02-13 PROCEDURE — 87077 CULTURE AEROBIC IDENTIFY: CPT

## 2023-02-13 PROCEDURE — 87186 SC STD MICRODIL/AGAR DIL: CPT

## 2023-02-13 PROCEDURE — 87086 URINE CULTURE/COLONY COUNT: CPT

## 2023-02-15 LAB
MICROORGANISM SPEC CULT: ABNORMAL
SPECIMEN DESCRIPTION: ABNORMAL

## 2023-02-23 ENCOUNTER — OUTSIDE SERVICES (OUTPATIENT)
Dept: INTERNAL MEDICINE | Age: 83
End: 2023-02-23

## 2023-02-23 VITALS
SYSTOLIC BLOOD PRESSURE: 108 MMHG | HEART RATE: 67 BPM | RESPIRATION RATE: 16 BRPM | OXYGEN SATURATION: 94 % | DIASTOLIC BLOOD PRESSURE: 64 MMHG | TEMPERATURE: 97.9 F

## 2023-02-23 DIAGNOSIS — L89.310 PRESSURE SORE ON BUTTOCKS, RIGHT, UNSTAGEABLE (HCC): ICD-10-CM

## 2023-02-23 DIAGNOSIS — R32 URINARY INCONTINENCE, UNSPECIFIED TYPE: Primary | ICD-10-CM

## 2023-02-23 NOTE — PROGRESS NOTES
[address] [place] [address] Dr. Spenser Mayen is a 81 y.o. female resident of [address] [place] [address] . HPI:     HPI  Patient presents for evaluation and management of urinary incontinence. Has been having trouble healing sacral wound due to incontinence, no sense of need to void. Staff has been diligent with dressing changes and wound care, but the constant presence of urine in the area has made it difficult to heal. Known history of primary lateral sclerosis, which is contributing to the incontinence concerns. Patient is otherwise doing well. Denies fever, chills, nausea, vomiting, or diarrhea. Current Outpatient Medications   Medication Sig Dispense Refill    ZINC OXIDE EX Apply topically 3 times daily as needed to buttocks for protection      guaiFENesin (ROBITUSSIN) 100 MG/5ML liquid Take 10 mLs by mouth 3 times daily as needed for Cough 180 mL 0    metoprolol succinate (TOPROL XL) 25 MG extended release tablet Take 0.5 tablets by mouth daily 30 tablet 3    Acetaminophen 325 MG CAPS Take 650 mg by mouth every 6 hours as needed (pain) 120 capsule 0    vitamin D 25 MCG (1000 UT) CAPS Take 1,000 Units by mouth daily      aspirin 81 MG chewable tablet Take 81 mg by mouth daily      oxybutynin (DITROPAN-XL) 5 MG extended release tablet Take 5 mg by mouth daily      furosemide (LASIX) 20 MG tablet Take 1 tablet by mouth daily 90 tablet 1    baclofen (LIORESAL) 20 MG tablet Take 20 mg by mouth Every 8 hours      folic acid (FOLVITE) 1 MG tablet Take 1 mg by mouth daily      vitamin B-12 (CYANOCOBALAMIN) 500 MCG tablet Take 500 mcg by mouth daily. No current facility-administered medications for this visit.      No Known Allergies    Health Maintenance   Topic Date Due    Depression Screen  Never done    Shingles vaccine (1 of 2) Never done    DTaP/Tdap/Td vaccine (1 - Tdap) 07/17/2009    Annual Wellness Visit (AWV)  04/11/2020    Flu vaccine (1) 08/01/2022    COVID-19 Vaccine (5 - Booster for Curse series) 09/09/2022    DEXA (modify frequency per FRAX score)  Completed    Pneumococcal 65+ years Vaccine  Completed    Hepatitis A vaccine  Aged Out    Hib vaccine  Aged Out    Meningococcal (ACWY) vaccine  Aged Out       Subjective:      Review of Systems   Constitutional:  Negative for chills and fever. HENT:  Negative for congestion and rhinorrhea. Respiratory:  Negative for cough and wheezing. Cardiovascular:  Negative for chest pain and leg swelling. Gastrointestinal:  Negative for diarrhea, nausea and vomiting. Neurological:  Positive for weakness. Negative for dizziness. Objective:     Vitals:    02/23/23 1529   BP: 108/64   Pulse: 67   Resp: 16   Temp: 97.9 °F (36.6 °C)   SpO2: 94%     Physical Exam  Constitutional:       General: She is not in acute distress. Appearance: She is not ill-appearing. HENT:      Head: Normocephalic and atraumatic. Right Ear: External ear normal.      Left Ear: External ear normal.   Eyes:      Extraocular Movements: Extraocular movements intact. Conjunctiva/sclera: Conjunctivae normal.   Cardiovascular:      Rate and Rhythm: Normal rate and regular rhythm. Pulmonary:      Effort: Pulmonary effort is normal. No respiratory distress. Breath sounds: Normal breath sounds. Musculoskeletal:      Comments: Staff is not presently available to reposition patient in order to view sacral wound   Neurological:      General: No focal deficit present. Mental Status: She is alert. Mental status is at baseline. Psychiatric:         Mood and Affect: Mood normal.         Behavior: Behavior normal.       Assessment/Plan:        1. Urinary incontinence, unspecified type  2. Pressure sore on buttocks, right, unstageable (HCC)  - Urinary Ramos catheter x 1 month to be changed by home health. Discussed with staff that this will not be long term, only for one month to allow wound to heal.      Return if symptoms worsen or fail to improve.     No orders of the defined types were placed in this encounter. No orders of the defined types were placed in this encounter.               Electronically signed by HIEU Angulo CNP on 2/25/2023 at 9:04 AM

## 2023-02-25 ASSESSMENT — ENCOUNTER SYMPTOMS
RHINORRHEA: 0
WHEEZING: 0
DIARRHEA: 0
COUGH: 0
VOMITING: 0
NAUSEA: 0

## 2023-02-28 ENCOUNTER — TELEPHONE (OUTPATIENT)
Dept: INTERNAL MEDICINE | Age: 83
End: 2023-02-28
Payer: MEDICARE

## 2023-02-28 DIAGNOSIS — L89.310 PRESSURE SORE ON BUTTOCKS, RIGHT, UNSTAGEABLE (HCC): ICD-10-CM

## 2023-02-28 DIAGNOSIS — Z79.82 LONG-TERM USE OF ASPIRIN THERAPY: ICD-10-CM

## 2023-02-28 DIAGNOSIS — E55.9 VITAMIN D DEFICIENCY: ICD-10-CM

## 2023-02-28 DIAGNOSIS — G12.23 PRIMARY LATERAL SCLEROSIS (HCC): Primary | ICD-10-CM

## 2023-02-28 DIAGNOSIS — I10 ESSENTIAL HYPERTENSION: ICD-10-CM

## 2023-02-28 DIAGNOSIS — L03.211 CELLULITIS OF FACE: ICD-10-CM

## 2023-02-28 PROCEDURE — G0179 MD RECERTIFICATION HHA PT: HCPCS | Performed by: NURSE PRACTITIONER

## 2023-03-09 ENCOUNTER — HOSPITAL ENCOUNTER (OUTPATIENT)
Age: 83
Setting detail: SPECIMEN
Discharge: HOME | End: 2023-03-09
Payer: MEDICARE

## 2023-03-09 LAB
BACTERIA: ABNORMAL
BILIRUBIN URINE: NEGATIVE
COLOR: ABNORMAL
COMMENT UA: ABNORMAL
EPITHELIAL CELLS UA: ABNORMAL /HPF (ref 0–5)
GLUCOSE UR STRIP.AUTO-MCNC: NEGATIVE MG/DL
KETONES UR STRIP.AUTO-MCNC: NEGATIVE MG/DL
LEUKOCYTE ESTERASE UR QL STRIP.AUTO: ABNORMAL
MICROORGANISM SPEC CULT: NO GROWTH
NITRITE UR QL STRIP.AUTO: POSITIVE
PROT UR STRIP.AUTO-MCNC: 8 MG/DL (ref 5–6)
PROT UR STRIP.AUTO-MCNC: ABNORMAL MG/DL
RBC CLUMPS #/AREA URNS AUTO: ABNORMAL /HPF (ref 0–4)
SPECIFIC GRAVITY UA: 1.02 (ref 1.01–1.02)
SPECIMEN DESCRIPTION: NORMAL
TURBIDITY: ABNORMAL
URINE HGB: ABNORMAL
UROBILINOGEN, URINE: NORMAL
WBC UA: ABNORMAL /HPF (ref 0–4)

## 2023-03-09 PROCEDURE — 87086 URINE CULTURE/COLONY COUNT: CPT

## 2023-03-09 PROCEDURE — 81001 URINALYSIS AUTO W/SCOPE: CPT

## 2023-03-09 RX ORDER — CEFUROXIME AXETIL 250 MG/1
250 TABLET ORAL 2 TIMES DAILY
Qty: 14 TABLET | Refills: 0 | Status: SHIPPED | OUTPATIENT
Start: 2023-03-09 | End: 2023-03-16

## 2023-03-13 ENCOUNTER — TELEPHONE (OUTPATIENT)
Dept: INTERNAL MEDICINE | Age: 83
End: 2023-03-13
Payer: MEDICARE

## 2023-03-13 DIAGNOSIS — I10 ESSENTIAL HYPERTENSION: ICD-10-CM

## 2023-03-13 DIAGNOSIS — L03.211 CELLULITIS OF FACE: ICD-10-CM

## 2023-03-13 DIAGNOSIS — L89.310 PRESSURE SORE ON BUTTOCKS, RIGHT, UNSTAGEABLE (HCC): Primary | ICD-10-CM

## 2023-03-13 DIAGNOSIS — G12.23 PRIMARY LATERAL SCLEROSIS (HCC): ICD-10-CM

## 2023-03-13 DIAGNOSIS — R32 URINARY INCONTINENCE, UNSPECIFIED TYPE: ICD-10-CM

## 2023-03-13 DIAGNOSIS — E55.9 VITAMIN D DEFICIENCY: ICD-10-CM

## 2023-03-13 DIAGNOSIS — Z79.82 LONG-TERM USE OF ASPIRIN THERAPY: ICD-10-CM

## 2023-03-13 PROCEDURE — G0180 MD CERTIFICATION HHA PATIENT: HCPCS | Performed by: NURSE PRACTITIONER

## 2023-03-24 ENCOUNTER — HOSPITAL ENCOUNTER (OUTPATIENT)
Age: 83
Setting detail: SPECIMEN
Discharge: HOME | End: 2023-03-24
Payer: MEDICARE

## 2023-03-24 ENCOUNTER — TELEPHONE (OUTPATIENT)
Dept: INTERNAL MEDICINE | Age: 83
End: 2023-03-24

## 2023-03-24 LAB
BACTERIA: ABNORMAL
BILIRUBIN URINE: NEGATIVE
COLOR: YELLOW
EPITHELIAL CELLS UA: ABNORMAL /HPF (ref 0–5)
GLUCOSE UR STRIP.AUTO-MCNC: NEGATIVE MG/DL
KETONES UR STRIP.AUTO-MCNC: NEGATIVE MG/DL
LEUKOCYTE ESTERASE UR QL STRIP.AUTO: ABNORMAL
NITRITE UR QL STRIP.AUTO: POSITIVE
OTHER OBSERVATIONS UA: ABNORMAL
PROT UR STRIP.AUTO-MCNC: >=9 MG/DL (ref 5–6)
PROT UR STRIP.AUTO-MCNC: ABNORMAL MG/DL
RBC CLUMPS #/AREA URNS AUTO: ABNORMAL /HPF (ref 0–4)
SPECIFIC GRAVITY UA: 1.01 (ref 1.01–1.02)
TURBIDITY: ABNORMAL
URINE HGB: ABNORMAL
UROBILINOGEN, URINE: NORMAL
WBC UA: ABNORMAL /HPF (ref 0–4)

## 2023-03-24 PROCEDURE — 81001 URINALYSIS AUTO W/SCOPE: CPT

## 2023-03-24 RX ORDER — CEPHALEXIN 500 MG/1
500 CAPSULE ORAL 2 TIMES DAILY
Qty: 14 CAPSULE | Refills: 0 | Status: SHIPPED | OUTPATIENT
Start: 2023-03-24 | End: 2023-03-31

## 2023-04-03 ENCOUNTER — TELEPHONE (OUTPATIENT)
Dept: INTERNAL MEDICINE | Age: 83
End: 2023-04-03

## 2023-04-03 NOTE — TELEPHONE ENCOUNTER
Received fax from [id number] re: resident expresses pain with am and hs care. Unable to read a specific word, so writer called KP. Spoke with Demetria Cleveland- she is unaware of pain. She advises to disregard request. She will monitor pt for a couple days, and will call office if routine Tylenol is needed.

## 2023-04-05 ENCOUNTER — OUTSIDE SERVICES (OUTPATIENT)
Dept: INTERNAL MEDICINE | Age: 83
End: 2023-04-05
Payer: MEDICARE

## 2023-04-05 VITALS
SYSTOLIC BLOOD PRESSURE: 142 MMHG | TEMPERATURE: 97.9 F | OXYGEN SATURATION: 94 % | DIASTOLIC BLOOD PRESSURE: 90 MMHG | HEART RATE: 82 BPM | RESPIRATION RATE: 16 BRPM

## 2023-04-05 DIAGNOSIS — I49.1 PREMATURE ATRIAL CONTRACTIONS: ICD-10-CM

## 2023-04-05 DIAGNOSIS — R60.0 BILATERAL LOWER EXTREMITY EDEMA: ICD-10-CM

## 2023-04-05 DIAGNOSIS — G12.23 PRIMARY LATERAL SCLEROSIS (HCC): ICD-10-CM

## 2023-04-05 DIAGNOSIS — R32 URINARY INCONTINENCE, UNSPECIFIED TYPE: ICD-10-CM

## 2023-04-05 DIAGNOSIS — I10 ESSENTIAL HYPERTENSION: Primary | ICD-10-CM

## 2023-04-05 DIAGNOSIS — N18.9 CHRONIC KIDNEY DISEASE, UNSPECIFIED CKD STAGE: ICD-10-CM

## 2023-04-05 DIAGNOSIS — E55.9 VITAMIN D DEFICIENCY: ICD-10-CM

## 2023-04-05 DIAGNOSIS — L89.310 PRESSURE SORE ON BUTTOCKS, RIGHT, UNSTAGEABLE (HCC): ICD-10-CM

## 2023-04-05 PROCEDURE — 99348 HOME/RES VST EST LOW MDM 30: CPT | Performed by: NURSE PRACTITIONER

## 2023-04-06 ASSESSMENT — ENCOUNTER SYMPTOMS
CHEST TIGHTNESS: 0
NAUSEA: 0
VOMITING: 0
SORE THROAT: 0
SHORTNESS OF BREATH: 0
DIARRHEA: 0

## 2023-04-06 NOTE — PROGRESS NOTES
Visit note, and lab orders (CBC, BMP) faxed to  [facility]
Effort: Pulmonary effort is normal. No respiratory distress. Breath sounds: Normal breath sounds. Skin:     General: Skin is warm and dry. Neurological:      General: No focal deficit present. Mental Status: She is alert. Mental status is at baseline. Psychiatric:         Mood and Affect: Mood normal.         Behavior: Behavior normal.       Assessment/Plan:        1. Essential hypertension,  stable  - Continue current medications  - CBC with Auto Differential; Future    2. Premature atrial contractions, stable  - Continue metoprolol    3. Chronic kidney disease, unspecified CKD stage, stable  - Continue to monitor  - Basic Metabolic Panel; Future    4. Primary lateral sclerosis ([address]  stable  - Continue current medications    5. Bilateral lower extremity edema, stable  - Continue to monitor    6. Vitamin D deficiency, stable  - Continue supplement    7. Urinary incontinence, unspecified type,  stable  - Continue current medications    8. Pressure sore on buttocks, right, unstageable ([address] resolved  - Continue to monitor for recurrence        Return in about 3 months (around 7/5/2023). Orders Placed This Encounter   Procedures    Basic Metabolic Panel     Standing Status:   Future     Standing Expiration Date:   4/6/2024    CBC with Auto Differential     Standing Status:   Future     Standing Expiration Date:   4/6/2024     No orders of the defined types were placed in this encounter.     Electronically signed by HIEU Perez CNP on 4/6/2023 at 1:31 PM

## 2023-04-22 ENCOUNTER — HOSPITAL ENCOUNTER (OUTPATIENT)
Age: 83
Setting detail: SPECIMEN
Discharge: HOME | End: 2023-04-22
Payer: MEDICARE

## 2023-04-22 LAB
AMORPHOUS: ABNORMAL
BACTERIA: ABNORMAL
BILIRUBIN URINE: NEGATIVE
COLOR: YELLOW
EPITHELIAL CELLS UA: ABNORMAL /HPF (ref 0–5)
GLUCOSE UR STRIP.AUTO-MCNC: NEGATIVE MG/DL
KETONES UR STRIP.AUTO-MCNC: ABNORMAL MG/DL
LEUKOCYTE ESTERASE UR QL STRIP.AUTO: ABNORMAL
NITRITE UR QL STRIP.AUTO: NEGATIVE
PROT UR STRIP.AUTO-MCNC: 7 MG/DL (ref 5–6)
PROT UR STRIP.AUTO-MCNC: ABNORMAL MG/DL
RBC CLUMPS #/AREA URNS AUTO: ABNORMAL /HPF (ref 0–4)
SPECIFIC GRAVITY UA: 1.01 (ref 1.01–1.02)
TURBIDITY: CLEAR
URINE HGB: ABNORMAL
UROBILINOGEN, URINE: NORMAL
WBC UA: ABNORMAL /HPF (ref 0–4)

## 2023-04-22 PROCEDURE — 87086 URINE CULTURE/COLONY COUNT: CPT

## 2023-04-22 PROCEDURE — 81001 URINALYSIS AUTO W/SCOPE: CPT

## 2023-04-24 RX ORDER — CEFUROXIME AXETIL 250 MG/1
250 TABLET ORAL 2 TIMES DAILY
Qty: 14 TABLET | Refills: 0 | Status: SHIPPED | OUTPATIENT
Start: 2023-04-24 | End: 2023-04-27 | Stop reason: ALTCHOICE

## 2023-04-26 ENCOUNTER — TELEPHONE (OUTPATIENT)
Dept: INTERNAL MEDICINE | Age: 83
End: 2023-04-26

## 2023-04-26 LAB
MICROORGANISM SPEC CULT: ABNORMAL
MICROORGANISM SPEC CULT: ABNORMAL
SPECIMEN DESCRIPTION: ABNORMAL

## 2023-04-26 NOTE — TELEPHONE ENCOUNTER
Spoke with KARLA Lynn)- she was under the impression that the Ramos was to be d/c'd. [facility]'s nurse Dale Diaz) has already removed  it.  did get approval for a Pure Wick external catheter. [facility] will be faxing information. Lion Lemming will be talking with family re: PCP situation, but really hoping they will agree to [facility].

## 2023-04-26 NOTE — TELEPHONE ENCOUNTER
Discussed patient's case with Leyla Gimenez at [facility] today. She noted that upon insertion of most insertion of recent Ramos, patient had post void residual of over 450 mL. Due to her urinary incontinence, she has already developed skin breakdown that has become difficult to control without Ramos. I am concerned that her Primary Lateral Sclerosis is progressing and this has resulted in neurogenic bladder. She will likely require increasing intervention, including long term Ramos, as her condition continues to deteriorate. This will increase her risk of developing multiple urinary tract infections, often with multi drug resistance that may require IV antibiotics. Per [facility] staff, family has declined further follow up with neurology for PLS, and declined referral to urology for her recent urinary concerns. Please let family know that if they do not want to proceed with treatment by specialists, they will need to find another primary care provider, as her current condition exceeds my scope of practice without specialist oversight. I will continue to provide care until a new PCP can be established if they decline specialist referral.     At this point, can continue Ramos for one additional week while family can discuss options. The above may need to be addressed in a formal letter for documentation. Thank you.

## 2023-04-26 NOTE — TELEPHONE ENCOUNTER
Noted.  Can straight [facility] if no voiding noted while working on a long term solution    Electronically signed by HIEU Moore CNP on 4/26/2023 at 2:23 PM

## 2023-04-26 NOTE — TELEPHONE ENCOUNTER
Vonda Acosta with [facility] called in on patient wanting to make sure that Riky Soria wants patients may to be pulled today. After speaking lulú I did advise Vonda Acosta to please wait on pulling may. Riky Soria will be out there today to evaluate patient. Per Tiff Dale he will wait on go to see patient today as he only goes once a week and would rather wait until after alexandra see patient.  Vonda Acosta can be reached at [phone]

## 2023-04-27 RX ORDER — LEVOFLOXACIN 250 MG/1
250 TABLET ORAL DAILY
Qty: 7 TABLET | Refills: 0 | Status: SHIPPED | OUTPATIENT
Start: 2023-04-27 | End: 2023-05-04

## 2023-05-08 ENCOUNTER — TELEPHONE (OUTPATIENT)
Dept: INTERNAL MEDICINE | Age: 83
End: 2023-05-08

## 2023-05-09 NOTE — TELEPHONE ENCOUNTER
Noted. Can we confirm if she has been admitted to hospice? If asymptomatic and currently on hospice, could consider monitoring.  If family would like to consider paxlovid or similar, can discuss this as well

## 2023-06-06 ENCOUNTER — TELEPHONE (OUTPATIENT)
Dept: INTERNAL MEDICINE | Age: 83
End: 2023-06-06
Payer: MEDICARE

## 2023-06-06 DIAGNOSIS — I10 ESSENTIAL HYPERTENSION: ICD-10-CM

## 2023-06-06 DIAGNOSIS — Z79.82 LONG-TERM USE OF ASPIRIN THERAPY: ICD-10-CM

## 2023-06-06 DIAGNOSIS — E55.9 VITAMIN D DEFICIENCY: ICD-10-CM

## 2023-06-06 DIAGNOSIS — R32 URINARY INCONTINENCE, UNSPECIFIED TYPE: ICD-10-CM

## 2023-06-06 DIAGNOSIS — G12.23 PRIMARY LATERAL SCLEROSIS (HCC): Primary | ICD-10-CM

## 2023-06-06 DIAGNOSIS — L03.211 CELLULITIS OF FACE: ICD-10-CM

## 2023-06-06 PROCEDURE — G0179 MD RECERTIFICATION HHA PT: HCPCS | Performed by: NURSE PRACTITIONER

## 2023-06-07 RX ORDER — BISACODYL 10 MG
10 SUPPOSITORY, RECTAL RECTAL DAILY PRN
COMMUNITY

## 2023-06-07 RX ORDER — ONDANSETRON 4 MG/1
4 TABLET, FILM COATED ORAL EVERY 8 HOURS PRN
COMMUNITY

## 2023-06-07 RX ORDER — MORPHINE SULFATE 100 MG/5ML
5 SOLUTION ORAL
COMMUNITY

## 2023-06-07 RX ORDER — ACETAMINOPHEN 650 MG/20.3ML
15 SUSPENSION ORAL EVERY 6 HOURS PRN
COMMUNITY

## 2023-06-07 RX ORDER — LORAZEPAM 0.5 MG/1
0.5 TABLET ORAL EVERY 4 HOURS PRN
COMMUNITY

## 2023-07-06 ENCOUNTER — OUTSIDE SERVICES (OUTPATIENT)
Dept: INTERNAL MEDICINE | Age: 83
End: 2023-07-06

## 2023-07-06 VITALS
SYSTOLIC BLOOD PRESSURE: 118 MMHG | HEART RATE: 67 BPM | RESPIRATION RATE: 16 BRPM | TEMPERATURE: 97.6 F | DIASTOLIC BLOOD PRESSURE: 74 MMHG

## 2023-07-06 DIAGNOSIS — N18.9 CHRONIC KIDNEY DISEASE, UNSPECIFIED CKD STAGE: ICD-10-CM

## 2023-07-06 DIAGNOSIS — M81.0 OSTEOPOROSIS OF LOWER LEG WITHOUT PATHOLOGICAL FRACTURE: ICD-10-CM

## 2023-07-06 DIAGNOSIS — Z51.5 HOSPICE CARE: ICD-10-CM

## 2023-07-06 DIAGNOSIS — E55.9 VITAMIN D DEFICIENCY: ICD-10-CM

## 2023-07-06 DIAGNOSIS — G12.23 PRIMARY LATERAL SCLEROSIS (HCC): ICD-10-CM

## 2023-07-06 DIAGNOSIS — I49.1 PREMATURE ATRIAL CONTRACTIONS: ICD-10-CM

## 2023-07-06 DIAGNOSIS — R60.0 BILATERAL LOWER EXTREMITY EDEMA: ICD-10-CM

## 2023-07-06 DIAGNOSIS — I10 ESSENTIAL HYPERTENSION: Primary | ICD-10-CM

## 2023-07-06 DIAGNOSIS — R32 URINARY INCONTINENCE, UNSPECIFIED TYPE: ICD-10-CM

## 2023-07-06 ASSESSMENT — ENCOUNTER SYMPTOMS
DIARRHEA: 0
VOMITING: 0
SHORTNESS OF BREATH: 0
SORE THROAT: 0
NAUSEA: 0
CHEST TIGHTNESS: 0

## 2023-07-06 NOTE — PROGRESS NOTES
[address] [place] [address]      Lacy Herman is a 81 y.o. female resident of [address] [place] [address]. HPI:     HPI  Patient presents for evaluation and management of chronic medical conditions as noted below. Patient has entered hospice since previous visit    Continues on metoprolol for hypertension, which is well controlled. Denies chest pain or dyspnea. No longer following with cardiology for premature atrial contractions. No recent complaints of palpitations. CKD stable, GFR greater than 60, creatinine 0.69. No longer following with neurology for primary lateral sclerosis. Stable on baclofen. Stable bilateral lower extremity edema. Vitamin D deficiency stable on supplement. Now has may catheter, which hospice has been managing. Urinary incontinence stable. Continues on oxybutynin         Current Outpatient Medications   Medication Sig Dispense Refill    acetaminophen (TYLENOL) 650 MG/20.3ML SUSP Take 15 mg/kg by mouth every 6 hours as needed for Pain or Fever      OXYGEN Inhale 2-4 L into the lungs as needed (comfort)      bisacodyl (DULCOLAX) 10 MG suppository Place 1 suppository rectally daily as needed for Constipation      hyoscyamine (LEVSIN/SL) 125 MCG sublingual tablet Take 1 tablet by mouth every 4 hours as needed (increased secretions)      LORazepam (ATIVAN) 0.5 MG tablet Take 1 tablet by mouth every 4 hours as needed for Anxiety. Max Daily Amount: 3 mg      morphine sulfate 20 MG/ML concentrated oral solution Take 0.25 mLs by mouth every hour as needed for Pain or Shortness of Breath.  Max Daily Amount: 120 mg      ondansetron (ZOFRAN) 4 MG tablet Take 1 tablet by mouth every 8 hours as needed for Nausea or Vomiting      ZINC OXIDE EX Apply topically 3 times daily as needed to buttocks for protection      guaiFENesin (ROBITUSSIN) 100 MG/5ML liquid Take 10 mLs by mouth 3 times daily as needed for Cough 180 mL 0    metoprolol succinate (TOPROL XL) 25 MG extended

## 2023-07-19 RX ORDER — ACETAMINOPHEN 650 MG/1
650 SUPPOSITORY RECTAL EVERY 6 HOURS PRN
COMMUNITY

## 2023-08-09 RX ORDER — MELATONIN
1000 DAILY
COMMUNITY

## 2023-08-23 RX ORDER — NYSTATIN 100000 [USP'U]/G
POWDER TOPICAL 2 TIMES DAILY PRN
COMMUNITY

## 2023-10-10 RX ORDER — METOPROLOL SUCCINATE 25 MG/1
12.5 TABLET, EXTENDED RELEASE ORAL DAILY
Qty: 45 TABLET | Refills: 3 | Status: SHIPPED | OUTPATIENT
Start: 2023-10-10

## 2023-10-10 NOTE — TELEPHONE ENCOUNTER
Received fax from [address] [place] [address]- \"pharmacy needs new script for Metoprolol ER 25 mg tab give 1/2 tab 12.5 mg daily\". RX pended.

## 2023-10-12 ENCOUNTER — OUTSIDE SERVICES (OUTPATIENT)
Dept: INTERNAL MEDICINE | Age: 83
End: 2023-10-12
Payer: MEDICARE

## 2023-10-12 VITALS
OXYGEN SATURATION: 96 % | DIASTOLIC BLOOD PRESSURE: 61 MMHG | SYSTOLIC BLOOD PRESSURE: 108 MMHG | RESPIRATION RATE: 18 BRPM | TEMPERATURE: 98.1 F | HEART RATE: 61 BPM

## 2023-10-12 DIAGNOSIS — R32 URINARY INCONTINENCE, UNSPECIFIED TYPE: ICD-10-CM

## 2023-10-12 DIAGNOSIS — R60.0 BILATERAL LOWER EXTREMITY EDEMA: ICD-10-CM

## 2023-10-12 DIAGNOSIS — E55.9 VITAMIN D DEFICIENCY: ICD-10-CM

## 2023-10-12 DIAGNOSIS — I49.1 PREMATURE ATRIAL CONTRACTIONS: ICD-10-CM

## 2023-10-12 DIAGNOSIS — Z51.5 HOSPICE CARE: Primary | ICD-10-CM

## 2023-10-12 DIAGNOSIS — I10 ESSENTIAL HYPERTENSION: ICD-10-CM

## 2023-10-12 DIAGNOSIS — N18.9 CHRONIC KIDNEY DISEASE, UNSPECIFIED CKD STAGE: ICD-10-CM

## 2023-10-12 DIAGNOSIS — G12.23 PRIMARY LATERAL SCLEROSIS (HCC): ICD-10-CM

## 2023-10-12 PROCEDURE — 99348 HOME/RES VST EST LOW MDM 30: CPT | Performed by: NURSE PRACTITIONER

## 2023-10-12 NOTE — PROGRESS NOTES
[address] [place] [address]      Delmy Lock is a 81 y.o. female resident of [address] [place] [address]. HPI:     HPI  Patient presents for evaluation and management of chronic medical conditions as noted below. Stable on hospice care, minimal decline. Hypertension is well controlled on metoprolol. Denies chest pain or dyspnea. No longer following with cardiology for premature atrial contractions. No recent complaints of palpitations. No recent labs available for CKD. No longer following with neurology for primary lateral sclerosis. Stable on baclofen. Stable bilateral lower extremity edema, continues on lasix. Continues on supplement for vitamin D deficiency. Hospice manages may catheter. Continues on oxybutynin    Current Outpatient Medications   Medication Sig Dispense Refill    metoprolol succinate (TOPROL XL) 25 MG extended release tablet Take 0.5 tablets by mouth daily 45 tablet 3    nystatin (MYCOSTATIN) 185662 UNIT/GM powder Apply topically 2 times daily as needed to marilyn area      vitamin D3 (CHOLECALCIFEROL) 25 MCG (1000 UT) TABS tablet Take 1 tablet by mouth daily      SODIUM CHLORIDE EX Flush catheter with 30 ml of NS as needed      acetaminophen (TYLENOL) 650 MG suppository Place 1 suppository rectally every 6 hours as needed for Fever or Pain      OXYGEN Inhale 2-4 L into the lungs as needed (comfort)      bisacodyl (DULCOLAX) 10 MG suppository Place 1 suppository rectally daily as needed for Constipation      hyoscyamine (LEVSIN/SL) 125 MCG sublingual tablet Take 1 tablet by mouth every 4 hours as needed (increased secretions)      LORazepam (ATIVAN) 0.5 MG tablet Take 1 tablet by mouth every 4 hours as needed for Anxiety. morphine sulfate 20 MG/ML concentrated oral solution Take 0.25 mLs by mouth every hour as needed for Pain or Shortness of Breath.       ondansetron (ZOFRAN) 4 MG tablet Take 1 tablet by mouth every 4 hours as needed for Nausea or Vomiting

## 2023-10-13 ASSESSMENT — ENCOUNTER SYMPTOMS
DIARRHEA: 0
SORE THROAT: 0
SHORTNESS OF BREATH: 0
NAUSEA: 0
CHEST TIGHTNESS: 0
VOMITING: 0

## 2023-12-14 ENCOUNTER — OUTSIDE SERVICES (OUTPATIENT)
Dept: INTERNAL MEDICINE | Age: 83
End: 2023-12-14

## 2023-12-14 DIAGNOSIS — M81.0 OSTEOPOROSIS OF LOWER LEG WITHOUT PATHOLOGICAL FRACTURE: ICD-10-CM

## 2023-12-14 DIAGNOSIS — R32 URINARY INCONTINENCE, UNSPECIFIED TYPE: ICD-10-CM

## 2023-12-14 DIAGNOSIS — E55.9 VITAMIN D DEFICIENCY: ICD-10-CM

## 2023-12-14 DIAGNOSIS — I10 ESSENTIAL HYPERTENSION: ICD-10-CM

## 2023-12-14 DIAGNOSIS — R60.0 BILATERAL LOWER EXTREMITY EDEMA: ICD-10-CM

## 2023-12-14 DIAGNOSIS — N18.9 CHRONIC KIDNEY DISEASE, UNSPECIFIED CKD STAGE: ICD-10-CM

## 2023-12-14 DIAGNOSIS — Z51.5 HOSPICE CARE: Primary | ICD-10-CM

## 2023-12-14 DIAGNOSIS — G12.23 PRIMARY LATERAL SCLEROSIS (HCC): ICD-10-CM

## 2023-12-14 DIAGNOSIS — I49.1 PREMATURE ATRIAL CONTRACTIONS: ICD-10-CM

## 2023-12-14 NOTE — PROGRESS NOTES
[address] [place] [address]      Tacho Parish is a 81 y.o. female resident of [address] [place] [address]. HPI:     HPI  Patient presents for evaluation and management of chronic medical conditions as noted below. Stable on hospice care, minimal decline. Continues on metoprolol for hypertension, which is well-controlled. Denies chest pain or dyspnea. No longer following with cardiology for premature atrial contractions. Stable on metoprolol, denies palpitations. No recent labs available for CKD. No longer following with neurology for primary lateral sclerosis. Stable on baclofen. Bilateral lower extremity edema stable on Lasix. Continues on supplement for vitamin D deficiency. Hospice manages may catheter. Continues on oxybutynin    Patient denies pain. Current Outpatient Medications   Medication Sig Dispense Refill    metoprolol succinate (TOPROL XL) 25 MG extended release tablet Take 0.5 tablets by mouth daily 45 tablet 3    nystatin (MYCOSTATIN) 690361 UNIT/GM powder Apply topically 2 times daily as needed to marilyn area      vitamin D3 (CHOLECALCIFEROL) 25 MCG (1000 UT) TABS tablet Take 1 tablet by mouth daily      SODIUM CHLORIDE EX Flush catheter with 30 ml of NS as needed      acetaminophen (TYLENOL) 650 MG suppository Place 1 suppository rectally every 6 hours as needed for Fever or Pain      OXYGEN Inhale 2-4 L into the lungs as needed (comfort)      bisacodyl (DULCOLAX) 10 MG suppository Place 1 suppository rectally daily as needed for Constipation      hyoscyamine (LEVSIN/SL) 125 MCG sublingual tablet Take 1 tablet by mouth every 4 hours as needed (increased secretions)      LORazepam (ATIVAN) 0.5 MG tablet Take 1 tablet by mouth every 4 hours as needed for Anxiety. morphine sulfate 20 MG/ML concentrated oral solution Take 0.25 mLs by mouth every hour as needed for Pain or Shortness of Breath.       ondansetron (ZOFRAN) 4 MG tablet Take 1 tablet by mouth

## 2024-01-08 RX ORDER — DEXTROMETHORPHAN HBR, GUAIFENESIN 20; 400 MG/20ML; MG/20ML
10 SOLUTION ORAL 2 TIMES DAILY PRN
COMMUNITY
End: 2024-01-08

## 2024-01-08 RX ORDER — DEXTROMETHORPHAN HBR. AND GUAIFENESIN 10; 100 MG/5ML; MG/5ML
10 SOLUTION ORAL 2 TIMES DAILY PRN
COMMUNITY

## 2024-01-24 ENCOUNTER — PATIENT MESSAGE (OUTPATIENT)
Dept: INTERNAL MEDICINE | Age: 84
End: 2024-01-24

## 2024-02-07 ENCOUNTER — TELEPHONE (OUTPATIENT)
Dept: INTERNAL MEDICINE | Age: 84
End: 2024-02-07

## 2024-02-07 RX ORDER — DEXTROMETHORPHAN HBR. AND GUAIFENESIN 10; 100 MG/5ML; MG/5ML
10 SOLUTION ORAL 2 TIMES DAILY PRN
COMMUNITY
End: 2024-02-07 | Stop reason: CLARIF

## 2024-02-07 NOTE — TELEPHONE ENCOUNTER
Writer reviewed  Monthly MARs (February); noted discrepancy:  1)  MARs shows Keflex 500 mg cap 1 po bid, dated 1/03/24.  Not currently on our [facility] med list. Med history shows 3/24/23 Keflex 500 mg 1 capsule bid x 7 days.    Faxed request to  to clarify if current Keflex (on their MARs) is an ongoing order, or short term script in January.

## 2024-03-13 RX ORDER — DEXTROMETHORPHAN HYDROBROMIDE, GUAIFENESIN 20; 200 MG/20ML; MG/20ML
10 SOLUTION ORAL 2 TIMES DAILY PRN
COMMUNITY

## 2024-03-14 ENCOUNTER — OUTSIDE SERVICES (OUTPATIENT)
Dept: INTERNAL MEDICINE | Age: 84
End: 2024-03-14

## 2024-03-14 DIAGNOSIS — G12.23 PRIMARY LATERAL SCLEROSIS (HCC): ICD-10-CM

## 2024-03-14 DIAGNOSIS — R32 URINARY INCONTINENCE, UNSPECIFIED TYPE: ICD-10-CM

## 2024-03-14 DIAGNOSIS — E55.9 VITAMIN D DEFICIENCY: ICD-10-CM

## 2024-03-14 DIAGNOSIS — I49.1 PREMATURE ATRIAL CONTRACTIONS: ICD-10-CM

## 2024-03-14 DIAGNOSIS — R60.0 BILATERAL LOWER EXTREMITY EDEMA: ICD-10-CM

## 2024-03-14 DIAGNOSIS — N18.9 CHRONIC KIDNEY DISEASE, UNSPECIFIED CKD STAGE: ICD-10-CM

## 2024-03-14 DIAGNOSIS — Z51.5 HOSPICE CARE: Primary | ICD-10-CM

## 2024-03-14 DIAGNOSIS — M81.0 OSTEOPOROSIS OF LOWER LEG WITHOUT PATHOLOGICAL FRACTURE: ICD-10-CM

## 2024-03-14 DIAGNOSIS — I10 ESSENTIAL HYPERTENSION: ICD-10-CM

## 2024-03-14 ASSESSMENT — ENCOUNTER SYMPTOMS
SORE THROAT: 0
VOMITING: 0
SHORTNESS OF BREATH: 0
DIARRHEA: 0
CHEST TIGHTNESS: 0
NAUSEA: 0

## 2024-03-14 NOTE — PROGRESS NOTES
[facility]      Michelle Deluna is a 84 y.o. female resident of [facility].    HPI:     HPI  Patient presents via virtual visit with assisted living staff member for evaluation and management of medical conditions as noted below. Continues on hospice care, has been stable per staff. Hypertension has been well controlled on metoprolol. Denies chest pain, stable dyspnea on exertion. No longer following with cardiology for premature atrial contractions.  Stable on metoprolol.No recent labs available for CKD.   No longer following with neurology for primary lateral sclerosis.  Stable on baclofen. Continues on lasix for BLE edema, which is stable. Continues on supplement for vitamin D deficiency and osteoporosis. Has may catheter, which is managed by hospice. Continues on oxybutynin.  Patient denies pain.    Current Outpatient Medications   Medication Sig Dispense Refill    Dextromethorphan-guaiFENesin (ROBAFEN DM)  MG/20ML LIQD Take 10 mLs by mouth 2 times daily as needed (cough, congestion)      dextromethorphan-guaiFENesin (GUAIASORB DM)  MG/5ML syrup Take 10 mLs by mouth 2 times daily as needed for Cough (congestion)      miconazole (MICOTIN) 2 % powder Apply topically 2 times daily as needed for Itching (Zeasorb) to marilyn area      metoprolol succinate (TOPROL XL) 25 MG extended release tablet Take 0.5 tablets by mouth daily 45 tablet 3    nystatin (MYCOSTATIN) 608205 UNIT/GM powder Apply topically daily as needed to marilyn area      vitamin D3 (CHOLECALCIFEROL) 25 MCG (1000 UT) TABS tablet Take 1 tablet by mouth daily      SODIUM CHLORIDE EX Flush catheter with 30 ml of NS as needed      acetaminophen (TYLENOL) 650 MG suppository Place 1 suppository rectally every 6 hours as needed for Fever or Pain      OXYGEN Inhale 2-4 L into the lungs as needed (comfort)      bisacodyl (DULCOLAX) 10 MG suppository Place 1 suppository rectally daily as needed for Constipation

## 2024-04-22 RX ORDER — BENZOCAINE 20 %
SPRAY, NON-AEROSOL (GRAM) MUCOUS MEMBRANE
COMMUNITY

## 2024-04-22 RX ORDER — GUAIFENESIN 200 MG/10ML
200 LIQUID ORAL 3 TIMES DAILY PRN
COMMUNITY

## 2024-04-22 RX ORDER — DEXTROMETHORPHAN HBR AND GUAIFENESIN 5; 100 MG/5ML; MG/5ML
10 LIQUID ORAL 2 TIMES DAILY PRN
COMMUNITY

## 2024-06-06 RX ORDER — DOCUSATE SODIUM 100 MG/1
100 CAPSULE, LIQUID FILLED ORAL 2 TIMES DAILY PRN
COMMUNITY

## 2024-06-27 ENCOUNTER — OUTSIDE SERVICES (OUTPATIENT)
Dept: INTERNAL MEDICINE | Age: 84
End: 2024-06-27
Payer: MEDICARE

## 2024-06-27 DIAGNOSIS — I10 ESSENTIAL HYPERTENSION: ICD-10-CM

## 2024-06-27 DIAGNOSIS — N18.9 CHRONIC KIDNEY DISEASE, UNSPECIFIED CKD STAGE: ICD-10-CM

## 2024-06-27 DIAGNOSIS — E55.9 VITAMIN D DEFICIENCY: ICD-10-CM

## 2024-06-27 DIAGNOSIS — G12.23 PRIMARY LATERAL SCLEROSIS (HCC): ICD-10-CM

## 2024-06-27 DIAGNOSIS — R60.0 BILATERAL LOWER EXTREMITY EDEMA: ICD-10-CM

## 2024-06-27 DIAGNOSIS — I49.1 PREMATURE ATRIAL CONTRACTIONS: ICD-10-CM

## 2024-06-27 DIAGNOSIS — M81.0 OSTEOPOROSIS OF LOWER LEG WITHOUT PATHOLOGICAL FRACTURE: ICD-10-CM

## 2024-06-27 DIAGNOSIS — R32 URINARY INCONTINENCE, UNSPECIFIED TYPE: ICD-10-CM

## 2024-06-27 DIAGNOSIS — Z51.5 HOSPICE CARE: Primary | ICD-10-CM

## 2024-06-27 PROCEDURE — 99347 HOME/RES VST EST SF MDM 20: CPT | Performed by: NURSE PRACTITIONER

## 2024-06-27 ASSESSMENT — ENCOUNTER SYMPTOMS
NAUSEA: 0
SORE THROAT: 0
DIARRHEA: 0
VOMITING: 0
SHORTNESS OF BREATH: 0
CHEST TIGHTNESS: 0

## 2024-06-27 NOTE — PROGRESS NOTES
[facility]      Michelle Deluna is a 84 y.o. female resident of [facility].    HPI:     HPI  Patient presents for evaluation and management of chronic medical conditions as noted below.  She continues on hospice care, gradual decline per staff.  Transfer via [facility] lift.  Continues on metoprolol for hypertension, which is well-controlled.  Denies chest pain or dyspnea.  No longer following with cardiology for premature atrial contractions.  No recent labs available for chronic kidney disease.  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  Continues on vitamin D supplement for deficiency and osteoporosis.  Has a Ramos catheter for neurogenic bladder which is managed by hospice.  Continues on oxybutynin.  Patient denies pain.    Current Outpatient Medications   Medication Sig Dispense Refill    docusate sodium (COLACE) 100 MG capsule Take 1 capsule by mouth 2 times daily as needed for Constipation      zinc oxide 13 % CREA Apply topically To buttocks with every brief change And prn      guaiFENesin (ANNIE-TUSSIN) 100 MG/5ML liquid Take 10 mLs by mouth 3 times daily as needed for Cough      Skin Protectants, Misc. (NO-STING SKIN-PREP EX) Apply topically at bedtime to bilateral heels      dextromethorphan-guaiFENesin (GUAIASORB DM)  MG/5ML syrup Take 10 mLs by mouth 2 times daily as needed for Cough (congestion)      miconazole (MICOTIN) 2 % powder Apply topically 2 times daily as needed for Itching (Zeasorb) to marilyn area      metoprolol succinate (TOPROL XL) 25 MG extended release tablet Take 0.5 tablets by mouth daily 45 tablet 3    nystatin (MYCOSTATIN) 574631 UNIT/GM powder Apply topically daily as needed to marilyn area      vitamin D3 (CHOLECALCIFEROL) 25 MCG (1000 UT) TABS tablet Take 1 tablet by mouth daily      SODIUM CHLORIDE EX Flush catheter with 30 ml of NS as needed      acetaminophen (TYLENOL) 650 MG suppository Place 1

## 2024-07-05 ENCOUNTER — PATIENT MESSAGE (OUTPATIENT)
Dept: INTERNAL MEDICINE | Age: 84
End: 2024-07-05

## 2024-09-25 RX ORDER — METOPROLOL SUCCINATE 25 MG/1
12.5 TABLET, EXTENDED RELEASE ORAL DAILY
Qty: 45 TABLET | Refills: 3 | Status: SHIPPED | OUTPATIENT
Start: 2024-09-25

## 2024-10-08 RX ORDER — LORAZEPAM 0.5 MG/1
0.5 TABLET ORAL NIGHTLY
COMMUNITY

## 2024-10-08 RX ORDER — DOCUSATE SODIUM 100 MG/1
100 CAPSULE, LIQUID FILLED ORAL DAILY
COMMUNITY

## 2024-10-10 ENCOUNTER — OUTSIDE SERVICES (OUTPATIENT)
Dept: INTERNAL MEDICINE | Age: 84
End: 2024-10-10
Payer: MEDICARE

## 2024-10-10 VITALS
SYSTOLIC BLOOD PRESSURE: 145 MMHG | RESPIRATION RATE: 18 BRPM | HEART RATE: 71 BPM | TEMPERATURE: 98.3 F | DIASTOLIC BLOOD PRESSURE: 90 MMHG | OXYGEN SATURATION: 94 %

## 2024-10-10 DIAGNOSIS — R26.81 GAIT INSTABILITY: ICD-10-CM

## 2024-10-10 DIAGNOSIS — E55.9 VITAMIN D DEFICIENCY: ICD-10-CM

## 2024-10-10 DIAGNOSIS — M81.0 OSTEOPOROSIS OF LOWER LEG WITHOUT PATHOLOGICAL FRACTURE: ICD-10-CM

## 2024-10-10 DIAGNOSIS — I49.1 PREMATURE ATRIAL CONTRACTIONS: ICD-10-CM

## 2024-10-10 DIAGNOSIS — Z51.5 HOSPICE CARE: Primary | ICD-10-CM

## 2024-10-10 DIAGNOSIS — I10 ESSENTIAL HYPERTENSION: ICD-10-CM

## 2024-10-10 DIAGNOSIS — R32 URINARY INCONTINENCE, UNSPECIFIED TYPE: ICD-10-CM

## 2024-10-10 DIAGNOSIS — G12.23 PRIMARY LATERAL SCLEROSIS (HCC): ICD-10-CM

## 2024-10-10 DIAGNOSIS — N18.9 CHRONIC KIDNEY DISEASE, UNSPECIFIED CKD STAGE: ICD-10-CM

## 2024-10-10 DIAGNOSIS — R60.0 BILATERAL LOWER EXTREMITY EDEMA: ICD-10-CM

## 2024-10-10 PROCEDURE — 99348 HOME/RES VST EST LOW MDM 30: CPT | Performed by: NURSE PRACTITIONER

## 2024-10-10 NOTE — PROGRESS NOTES
[facility]      Michelle Deluna is a 84 y.o. female resident of [facility].    HPI:     HPI  Patient presents for evaluation and management of chronic medical conditions as noted below. No concerns, stable on hospice per staff. Transfer via [facility]. Hypertension stable on metoprolol. Denies chest pain or dyspnea. No longer following with cardiology for premature atrial contractions. No recent labs available for chronic kidney disease. BLE edema stable on lasix. 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 Denies pain. Continues on vitamin D supplement for deficiency and osteoporosis.  Ramos catheter for neurogenic bladder managed by hospice.     Current Outpatient Medications   Medication Sig Dispense Refill    docusate sodium (COLACE) 100 MG capsule Take 1 capsule by mouth daily (per  Oct MARs)      LORazepam (ATIVAN) 0.5 MG tablet Take 1 tablet by mouth nightly. Max Daily Amount: 0.5 mg      zinc oxide 13 % CREA Apply topically 2 times daily as needed for Irritation to right gluteal fold      Wound Dressings (FOAM DRESSING BORDERED EX) Apply topically to affected area of coccyx and change every 3 days, and as needed for soiling.      NONFORMULARY Skin Prep to bilateral heels every hs      metoprolol succinate (TOPROL XL) 25 MG extended release tablet Take 0.5 tablets by mouth daily 45 tablet 3    zinc oxide (DESITIN MAXIMUM STRENGTH) 40 % PSTE paste Apply topically to buttocks with every brief change as needed      docusate sodium (COLACE) 100 MG capsule Take 1 capsule by mouth 2 times daily as needed for Constipation      guaiFENesin (ANNIE-TUSSIN) 100 MG/5ML liquid Take 10 mLs by mouth 3 times daily as needed for Cough      dextromethorphan-guaiFENesin (GUAIASORB DM)  MG/5ML syrup Take 10 mLs by mouth 2 times daily as needed for Cough (congestion)      miconazole (MICOTIN) 2 % powder Apply topically 2 times daily as needed for

## 2024-10-12 ASSESSMENT — ENCOUNTER SYMPTOMS
SORE THROAT: 0
DIARRHEA: 0
NAUSEA: 0
CHEST TIGHTNESS: 0
VOMITING: 0
SHORTNESS OF BREATH: 0

## 2024-11-06 RX ORDER — LORAZEPAM 0.5 MG/1
0.5 TABLET ORAL EVERY 4 HOURS PRN
COMMUNITY

## 2024-11-06 RX ORDER — SENNOSIDES A AND B 8.6 MG/1
1-2 TABLET, FILM COATED ORAL DAILY PRN
COMMUNITY

## 2025-01-09 ENCOUNTER — OUTSIDE SERVICES (OUTPATIENT)
Dept: INTERNAL MEDICINE | Age: 85
End: 2025-01-09
Payer: MEDICARE

## 2025-01-09 DIAGNOSIS — G12.23 PRIMARY LATERAL SCLEROSIS (HCC): ICD-10-CM

## 2025-01-09 DIAGNOSIS — N18.9 CHRONIC KIDNEY DISEASE, UNSPECIFIED CKD STAGE: ICD-10-CM

## 2025-01-09 DIAGNOSIS — Z51.5 HOSPICE CARE: ICD-10-CM

## 2025-01-09 DIAGNOSIS — R41.89 COGNITIVE DEFICITS: ICD-10-CM

## 2025-01-09 DIAGNOSIS — I10 ESSENTIAL HYPERTENSION: Primary | ICD-10-CM

## 2025-01-09 DIAGNOSIS — E55.9 VITAMIN D DEFICIENCY: ICD-10-CM

## 2025-01-09 PROBLEM — L89.310 PRESSURE SORE ON BUTTOCKS, RIGHT, UNSTAGEABLE (HCC): Status: RESOLVED | Noted: 2021-06-03 | Resolved: 2025-01-09

## 2025-01-09 PROCEDURE — 99348 HOME/RES VST EST LOW MDM 30: CPT | Performed by: NURSE PRACTITIONER

## 2025-01-09 NOTE — PROGRESS NOTES
01/09/25  Michelle Deluna  1940    Patient Resident of [facility]     Chief Complaint  1. Essential hypertension    2. Primary lateral sclerosis (HCC)    3. Chronic kidney disease, unspecified CKD stage    4. Vitamin D deficiency    5. Hospice care    6. Cognitive deficits        HPI:  Patient being seen for 3-month follow-up at assisted living.  She continues on hospice services.  Requires the assistance of staff for all ADLs.  Is nonambulatory.  Is a [facility] lift.  Does have a Ramos catheter that is draining light yellow urine.  Alert to name only.  Vitals have been stable per the nursing staff they deny any acute concerns at present.  Progressive mentation decline per the staff.    No Known Allergies    Past Medical History:   Diagnosis Date    Hypertension     Osteoarthritis     Osteoporosis     Primary lateral sclerosis (HCC)        Past Surgical History:   Procedure Laterality Date    APPENDECTOMY  11/1977    CATARACT REMOVAL Bilateral 2018    HYSTERECTOMY, TOTAL ABDOMINAL (CERVIX REMOVED)  1977    ovaries remain    TONSILLECTOMY AND ADENOIDECTOMY  1946       Medications as per [facility] Chart /reviewed     Social History     Socioeconomic History    Marital status:      Spouse name: Not on file    Number of children: Not on file    Years of education: Not on file    Highest education level: Not on file   Occupational History    Not on file   Tobacco Use    Smoking status: Never    Smokeless tobacco: Never   Vaping Use    Vaping status: Never Used   Substance and Sexual Activity    Alcohol use: No     Alcohol/week: 0.0 standard drinks of alcohol    Drug use: No    Sexual activity: Not on file   Other Topics Concern    Not on file   Social History Narrative    Not on file     Social Determinants of Health     Financial Resource Strain: Unknown (6/22/2020)    Overall Financial Resource Strain (CARDIA)     Difficulty of Paying Living Expenses: Patient declined   Food Insecurity: Unknown

## 2025-02-12 ENCOUNTER — OUTSIDE SERVICES (OUTPATIENT)
Dept: INTERNAL MEDICINE | Age: 85
End: 2025-02-12

## 2025-02-12 VITALS
SYSTOLIC BLOOD PRESSURE: 147 MMHG | DIASTOLIC BLOOD PRESSURE: 82 MMHG | OXYGEN SATURATION: 94 % | TEMPERATURE: 97 F | RESPIRATION RATE: 16 BRPM | HEART RATE: 80 BPM

## 2025-02-12 DIAGNOSIS — I10 ESSENTIAL HYPERTENSION: Primary | ICD-10-CM

## 2025-02-12 DIAGNOSIS — L89.312 PRESSURE INJURY OF RIGHT BUTTOCK, STAGE 2 (HCC): ICD-10-CM

## 2025-02-12 DIAGNOSIS — Z51.5 HOSPICE CARE: ICD-10-CM

## 2025-02-12 DIAGNOSIS — N18.9 CHRONIC KIDNEY DISEASE, UNSPECIFIED CKD STAGE: ICD-10-CM

## 2025-02-12 ASSESSMENT — ENCOUNTER SYMPTOMS
EYES NEGATIVE: 1
ALLERGIC/IMMUNOLOGIC NEGATIVE: 1
RESPIRATORY NEGATIVE: 1
GASTROINTESTINAL NEGATIVE: 1

## 2025-02-12 NOTE — PROGRESS NOTES
Michelle Deluna (:  1940) is a 85 y.o. female,Established patient, seen for evaluation of the following chief complaint(s):  Transition of care-discharged from hospice services.     BP (!) 147/82   Pulse 80   Temp 97 °F (36.1 °C)   Resp 16   SpO2 94%   Assessment & Plan  Essential hypertension   Chronic, at goal (stable), Stable on metoprolol, lasixOrders:    CBC with Auto Differential; Future    Basic Metabolic Panel; Future    Pressure injury of right buttock, stage 2 (HCC)   Chronic, not at goal (unstable), Continues with treatment, Unable to assess, patient sitting up in chair upon assessment. Orders:    CBC with Auto Differential; Future    Basic Metabolic Panel; Future    Chronic kidney disease, unspecified CKD stage    Not recently assessed, will order labs to monitor.Orders:    CBC with Auto Differential; Future    Basic Metabolic Panel; Future  Orders called to [facility] for next Wednesday draw.  Hospice care    Discharged from hospice services due to stable condition.        Subjective   Patient condition has stabilized for hospice services so she has been discharged, seen today for transition of care.       Review of Systems   Constitutional: Negative.    HENT: Negative.     Eyes: Negative.    Respiratory: Negative.     Cardiovascular: Negative.    Gastrointestinal: Negative.    Endocrine: Negative.    Genitourinary: Negative.    Musculoskeletal: Negative.    Skin:  Positive for wound.   Allergic/Immunologic: Negative.    Neurological: Negative.    Hematological: Negative.    Psychiatric/Behavioral: Negative.          Objective   Physical Exam  Vitals reviewed.   Constitutional:       General: She is not in acute distress.     Appearance: Normal appearance. She is obese. She is not ill-appearing.   HENT:      Head: Normocephalic and atraumatic.   Eyes:      Extraocular Movements: Extraocular movements intact.      Pupils: Pupils are equal, round, and reactive to light.   Neck:

## 2025-02-13 NOTE — ASSESSMENT & PLAN NOTE
Chronic, not at goal (unstable), Continues with treatment, Unable to assess, patient sitting up in chair upon assessment. Orders:    CBC with Auto Differential; Future    Basic Metabolic Panel; Future     Gisel Montez is a 56 y.o. female here for a non-provider visit for Venipuncture    If abnormal was an in office provider notified upon receipt of results (if so, indicate provider)? Yes  Routed to PCP? Yes       Referral in patients chart.

## 2025-02-13 NOTE — ASSESSMENT & PLAN NOTE
Chronic, at goal (stable), Stable on metoprolol, lasixOrders:    CBC with Auto Differential; Future    Basic Metabolic Panel; Future

## 2025-02-13 NOTE — ASSESSMENT & PLAN NOTE
Not recently assessed, will order labs to monitor.Orders:    CBC with Auto Differential; Future    Basic Metabolic Panel; Future  Orders called to [facility] for next Wednesday draw.

## 2025-02-17 ENCOUNTER — TELEPHONE (OUTPATIENT)
Dept: INTERNAL MEDICINE | Age: 85
End: 2025-02-17

## 2025-02-17 DIAGNOSIS — I10 ESSENTIAL HYPERTENSION: ICD-10-CM

## 2025-02-17 DIAGNOSIS — N18.9 CHRONIC KIDNEY DISEASE, UNSPECIFIED CKD STAGE: ICD-10-CM

## 2025-02-17 DIAGNOSIS — R32 URINARY INCONTINENCE, UNSPECIFIED TYPE: ICD-10-CM

## 2025-02-17 DIAGNOSIS — G12.23 PRIMARY LATERAL SCLEROSIS (HCC): Primary | ICD-10-CM

## 2025-02-17 DIAGNOSIS — M81.0 AGE-RELATED OSTEOPOROSIS WITHOUT CURRENT PATHOLOGICAL FRACTURE: ICD-10-CM

## 2025-02-17 DIAGNOSIS — I49.1 PREMATURE ATRIAL CONTRACTIONS: ICD-10-CM

## 2025-02-17 DIAGNOSIS — L89.312 PRESSURE INJURY OF RIGHT BUTTOCK, STAGE 2 (HCC): ICD-10-CM

## 2025-02-17 DIAGNOSIS — R41.89 COGNITIVE DEFICITS: ICD-10-CM

## 2025-02-17 NOTE — TELEPHONE ENCOUNTER
Received fax from - \"Please send orders for Shower Chair, Broda Chair, anf [facility] Lift- must be electric. Home Health for Wound Care, & Cath Care, and possible Aid for showers (shower aid will help w/ cost for family).\"  All orders/Referrals pended.    Please sign when in office, so Rx will print.

## 2025-02-18 NOTE — TELEPHONE ENCOUNTER
Jillian Salomon, APRN - NP  Daniella Moser, LPN  Caller: Unspecified (Yesterday,  8:37 AM)  Daniella,  Orders signed  Jillian Salguero

## 2025-02-19 ENCOUNTER — HOSPITAL ENCOUNTER (OUTPATIENT)
Age: 85
Setting detail: SPECIMEN
Discharge: HOME | End: 2025-02-19
Payer: MEDICARE

## 2025-02-19 DIAGNOSIS — I10 ESSENTIAL HYPERTENSION: ICD-10-CM

## 2025-02-19 DIAGNOSIS — L89.312 PRESSURE INJURY OF RIGHT BUTTOCK, STAGE 2 (HCC): ICD-10-CM

## 2025-02-19 DIAGNOSIS — N18.9 CHRONIC KIDNEY DISEASE, UNSPECIFIED CKD STAGE: ICD-10-CM

## 2025-02-19 LAB
ANION GAP SERPL CALCULATED.3IONS-SCNC: 6 MMOL/L (ref 9–17)
BASOPHILS # BLD: 0.03 K/UL (ref 0–0.2)
BASOPHILS NFR BLD: 0 % (ref 0–2)
BUN SERPL-MCNC: 16 MG/DL (ref 8–23)
BUN/CREAT SERPL: 27 (ref 9–20)
CALCIUM SERPL-MCNC: 8.5 MG/DL (ref 8.6–10.4)
CHLORIDE SERPL-SCNC: 104 MMOL/L (ref 98–107)
CO2 SERPL-SCNC: 28 MMOL/L (ref 20–31)
CREAT SERPL-MCNC: 0.6 MG/DL (ref 0.5–0.9)
EOSINOPHIL # BLD: 0.31 K/UL (ref 0–0.44)
EOSINOPHILS RELATIVE PERCENT: 4 % (ref 1–4)
ERYTHROCYTE [DISTWIDTH] IN BLOOD BY AUTOMATED COUNT: 12.7 % (ref 11.8–14.4)
GFR, ESTIMATED: 88 ML/MIN/1.73M2
GLUCOSE SERPL-MCNC: 94 MG/DL (ref 70–99)
HCT VFR BLD AUTO: 42.8 % (ref 36.3–47.1)
HGB BLD-MCNC: 14.3 G/DL (ref 11.9–15.1)
IMM GRANULOCYTES # BLD AUTO: <0.03 K/UL (ref 0–0.3)
IMM GRANULOCYTES NFR BLD: 0 %
LYMPHOCYTES NFR BLD: 2.12 K/UL (ref 1.1–3.7)
LYMPHOCYTES RELATIVE PERCENT: 28 % (ref 24–43)
MCH RBC QN AUTO: 30 PG (ref 25.2–33.5)
MCHC RBC AUTO-ENTMCNC: 33.4 G/DL (ref 25.2–33.5)
MCV RBC AUTO: 89.7 FL (ref 82.6–102.9)
MONOCYTES NFR BLD: 0.6 K/UL (ref 0.1–1.2)
MONOCYTES NFR BLD: 8 % (ref 3–12)
NEUTROPHILS NFR BLD: 60 % (ref 36–65)
NEUTS SEG NFR BLD: 4.47 K/UL (ref 1.5–8.1)
NRBC BLD-RTO: 0 PER 100 WBC
PLATELET # BLD AUTO: 294 K/UL (ref 138–453)
PMV BLD AUTO: 11.4 FL (ref 8.1–13.5)
POTASSIUM SERPL-SCNC: 4.1 MMOL/L (ref 3.7–5.3)
RBC # BLD AUTO: 4.77 M/UL (ref 3.95–5.11)
SODIUM SERPL-SCNC: 138 MMOL/L (ref 135–144)
WBC OTHER # BLD: 7.6 K/UL (ref 3.5–11.3)

## 2025-02-19 PROCEDURE — 80048 BASIC METABOLIC PNL TOTAL CA: CPT

## 2025-02-19 PROCEDURE — 36415 COLL VENOUS BLD VENIPUNCTURE: CPT

## 2025-02-19 PROCEDURE — 85025 COMPLETE CBC W/AUTO DIFF WBC: CPT

## 2025-02-20 NOTE — TELEPHONE ENCOUNTER
Received call from [facility] nurse (Shayy [phone])- she needs the Home Health Referral updated:  1) Nurse to assess for Wound Care  2) Frequency of Cath changes.    Spoke  nurse (Demetria)- Hospice was changing her catheter every month, and prn dislodgement.    Referral updated and faxed to , and [facility] ([phone]).

## 2025-03-04 RX ORDER — OXYBUTYNIN CHLORIDE 5 MG/1
TABLET ORAL
COMMUNITY

## 2025-03-10 RX ORDER — LACTOSE-REDUCED FOOD
1 LIQUID (ML) ORAL 2 TIMES DAILY WITH MEALS
COMMUNITY

## 2025-03-10 RX ORDER — NYSTATIN 100000 [USP'U]/ML
500000 SUSPENSION ORAL PRN
COMMUNITY

## 2025-03-17 ENCOUNTER — TELEPHONE (OUTPATIENT)
Dept: INTERNAL MEDICINE | Age: 85
End: 2025-03-17

## 2025-03-17 DIAGNOSIS — M81.0 AGE-RELATED OSTEOPOROSIS WITHOUT CURRENT PATHOLOGICAL FRACTURE: Primary | ICD-10-CM

## 2025-03-17 DIAGNOSIS — E55.9 VITAMIN D DEFICIENCY: ICD-10-CM

## 2025-03-17 DIAGNOSIS — G12.23 PRIMARY LATERAL SCLEROSIS (HCC): ICD-10-CM

## 2025-03-17 DIAGNOSIS — L89.312 PRESSURE INJURY OF RIGHT BUTTOCK, STAGE 2 (HCC): ICD-10-CM

## 2025-03-17 DIAGNOSIS — I10 ESSENTIAL HYPERTENSION: ICD-10-CM

## 2025-03-17 DIAGNOSIS — N18.9 CHRONIC KIDNEY DISEASE, UNSPECIFIED CKD STAGE: ICD-10-CM

## 2025-03-17 RX ORDER — ACETAMINOPHEN 325 MG/1
650 TABLET ORAL EVERY 6 HOURS PRN
COMMUNITY

## 2025-03-17 NOTE — TELEPHONE ENCOUNTER
This patient is currently under my care and considered medically homebound, requiring skilled Home Health services. I have reviewed the patient's status and plan of care.   [facility]- 2/12/25 to 4/21/25- [place].    Has the patient been seen within the last 90 days?  yes, 2/12/25    Time spent completing forms?  15 minutes.

## 2025-03-19 ENCOUNTER — TELEPHONE (OUTPATIENT)
Dept: INTERNAL MEDICINE | Age: 85
End: 2025-03-19

## 2025-03-19 NOTE — TELEPHONE ENCOUNTER
Received call from [facility])- the fax was difficult to read (\"for 1 week\"), so it did not get changed.  She is requesting a new signed order for Ditropan 5 mg 1 po bid to be faxed.   Order placed in your folder for signature.

## 2025-03-19 NOTE — TELEPHONE ENCOUNTER
faxed MARs yesterday per writer's request, to reconcile for Home Health Cert.  Their MAR showed Ditropan 2.5 mg bid.  Jillian Salomon's order from 3/02/25 fax stated \"Ditropan 2.5 mg 1 po bid x 1 week, then 5 mg 1 po bid. Then re-evaluate.\"  Attempted to speak with  nurse, but she was in a meeting. Left message (with Jesus Alberto) requesting nurse to return call.  Also faxed 3/02/25 fax to , requesting they correct their MAR.

## 2025-03-20 RX ORDER — OXYBUTYNIN CHLORIDE 5 MG/1
5 TABLET ORAL 2 TIMES DAILY
COMMUNITY

## 2025-04-02 DIAGNOSIS — F41.9 ANXIETY: Primary | ICD-10-CM

## 2025-04-02 RX ORDER — LORAZEPAM 0.5 MG/1
TABLET ORAL
Qty: 60 TABLET | Refills: 0 | Status: SHIPPED | OUTPATIENT
Start: 2025-04-02 | End: 2025-05-02

## 2025-04-08 ENCOUNTER — TELEPHONE (OUTPATIENT)
Dept: INTERNAL MEDICINE | Age: 85
End: 2025-04-08

## 2025-04-08 NOTE — TELEPHONE ENCOUNTER
Received fax from - \"Left foot and ankle swelling. PRN DEXTER Hose not effective. Please add to Jillian's 4/09/25 schedule.\"    Faxed back to - \"noted. If any concern for DVT, pt needs ER eval.\"

## 2025-04-09 ENCOUNTER — OUTSIDE SERVICES (OUTPATIENT)
Dept: INTERNAL MEDICINE | Age: 85
End: 2025-04-09
Payer: MEDICARE

## 2025-04-09 VITALS
HEART RATE: 60 BPM | TEMPERATURE: 97.3 F | SYSTOLIC BLOOD PRESSURE: 123 MMHG | RESPIRATION RATE: 14 BRPM | DIASTOLIC BLOOD PRESSURE: 80 MMHG | OXYGEN SATURATION: 95 %

## 2025-04-09 DIAGNOSIS — R60.0 BILATERAL LOWER EXTREMITY EDEMA: Primary | ICD-10-CM

## 2025-04-09 PROCEDURE — 99347 HOME/RES VST EST SF MDM 20: CPT

## 2025-04-09 ASSESSMENT — ENCOUNTER SYMPTOMS
GASTROINTESTINAL NEGATIVE: 1
RESPIRATORY NEGATIVE: 1

## 2025-04-09 NOTE — ASSESSMENT & PLAN NOTE
Chronic, not at goal (unstable), changes made today: Per hose on in am off at hs daily, dc prn order.

## 2025-04-10 ENCOUNTER — TELEPHONE (OUTPATIENT)
Dept: INTERNAL MEDICINE | Age: 85
End: 2025-04-10

## 2025-04-10 DIAGNOSIS — M81.0 AGE-RELATED OSTEOPOROSIS WITHOUT CURRENT PATHOLOGICAL FRACTURE: ICD-10-CM

## 2025-04-10 DIAGNOSIS — R60.0 BILATERAL LOWER EXTREMITY EDEMA: Primary | ICD-10-CM

## 2025-04-10 DIAGNOSIS — G12.23 PRIMARY LATERAL SCLEROSIS (HCC): ICD-10-CM

## 2025-04-10 DIAGNOSIS — R09.02 HYPOXIA: ICD-10-CM

## 2025-04-10 DIAGNOSIS — N18.9 CHRONIC KIDNEY DISEASE, UNSPECIFIED CKD STAGE: ICD-10-CM

## 2025-04-10 DIAGNOSIS — I10 ESSENTIAL HYPERTENSION: ICD-10-CM

## 2025-04-10 RX ORDER — COMPRESS.STOCKING,KNEE,REG,MED
EACH MISCELLANEOUS
COMMUNITY

## 2025-04-10 NOTE — TELEPHONE ENCOUNTER
Payton from [place] called requesting a hospice referrel \"sooner rather than later so we can get her some oxygen\". Please advise.

## 2025-04-10 NOTE — TELEPHONE ENCOUNTER
Rosina with [facility] called in stating that she is with patient and that she is unable to obtain a SP02 level on her. Rosina reports that she tried every finger, and tried a warm wash cloth and can not get patient above 86. Writer spoke with lulú and was advised to have them send patient to ER. Rosina informed and voices understanding.

## 2025-04-10 NOTE — PROGRESS NOTES
Faxed confirmation received at 12:13 pm.  
Visit note faxed to KARLA  
  Neurological:      Mental Status: She is alert. Mental status is at baseline.   Psychiatric:         Mood and Affect: Mood normal.         Behavior: Behavior normal.       /80   Pulse 60   Temp 97.3 °F (36.3 °C)   Resp 14   SpO2 95% Comment: RA    An electronic signature was used to authenticate this note.    --HIEU Iglesias NP   4/9/2025  7:23 PM

## 2025-05-14 ENCOUNTER — HOSPITAL ENCOUNTER (OUTPATIENT)
Age: 85
Setting detail: SPECIMEN
Discharge: HOME | End: 2025-05-14
Payer: MEDICARE

## 2025-05-14 LAB
BACTERIA URNS QL MICRO: ABNORMAL
BILIRUB UR QL STRIP: NEGATIVE
CLARITY UR: ABNORMAL
COLOR UR: YELLOW
EPI CELLS #/AREA URNS HPF: ABNORMAL /HPF (ref 0–5)
GLUCOSE UR STRIP-MCNC: NEGATIVE MG/DL
HGB UR QL STRIP.AUTO: NEGATIVE
KETONES UR STRIP-MCNC: NEGATIVE MG/DL
LEUKOCYTE ESTERASE UR QL STRIP: ABNORMAL
NITRITE UR QL STRIP: POSITIVE
PH UR STRIP: 7 [PH] (ref 5–6)
PROT UR STRIP-MCNC: NEGATIVE MG/DL
RBC #/AREA URNS HPF: ABNORMAL /HPF (ref 0–4)
SP GR UR STRIP: 1.01 (ref 1.01–1.02)
UROBILINOGEN UR STRIP-ACNC: NORMAL EU/DL (ref 0–1)
WBC #/AREA URNS HPF: ABNORMAL /HPF (ref 0–4)

## 2025-05-14 PROCEDURE — 81001 URINALYSIS AUTO W/SCOPE: CPT

## 2025-05-14 PROCEDURE — 87086 URINE CULTURE/COLONY COUNT: CPT

## 2025-05-14 PROCEDURE — 87186 SC STD MICRODIL/AGAR DIL: CPT

## 2025-05-14 PROCEDURE — 87088 URINE BACTERIA CULTURE: CPT

## 2025-05-15 RX ORDER — LORAZEPAM 0.5 MG/1
0.5 TABLET ORAL EVERY 4 HOURS PRN
COMMUNITY

## 2025-05-15 RX ORDER — GUAIFENESIN 200 MG/10ML
200 LIQUID ORAL 3 TIMES DAILY PRN
COMMUNITY
End: 2025-05-15 | Stop reason: SDUPTHER

## 2025-05-15 RX ORDER — MORPHINE SULFATE 100 MG/5ML
0.3 SOLUTION ORAL
COMMUNITY

## 2025-05-15 RX ORDER — LORAZEPAM 0.5 MG/1
0.5 TABLET ORAL NIGHTLY
COMMUNITY

## 2025-05-16 LAB
MICROORGANISM SPEC CULT: ABNORMAL
SPECIMEN DESCRIPTION: ABNORMAL

## 2025-06-03 DIAGNOSIS — F41.9 ANXIETY: ICD-10-CM

## 2025-06-03 RX ORDER — LORAZEPAM 0.5 MG/1
TABLET ORAL
Qty: 60 TABLET | Refills: 4 | Status: SHIPPED | OUTPATIENT
Start: 2025-06-03 | End: 2025-07-03

## 2025-06-05 RX ORDER — NYSTATIN 100000 [USP'U]/G
POWDER TOPICAL DAILY PRN
COMMUNITY

## 2025-07-14 RX ORDER — LORAZEPAM 0.5 MG/1
0.5 TABLET ORAL NIGHTLY
COMMUNITY

## 2025-07-14 RX ORDER — LORAZEPAM 0.5 MG/1
0.5 TABLET ORAL EVERY 4 HOURS PRN
COMMUNITY